# Patient Record
Sex: FEMALE | Race: WHITE | NOT HISPANIC OR LATINO | Employment: OTHER | URBAN - METROPOLITAN AREA
[De-identification: names, ages, dates, MRNs, and addresses within clinical notes are randomized per-mention and may not be internally consistent; named-entity substitution may affect disease eponyms.]

---

## 2017-07-07 ENCOUNTER — TRANSFERRED RECORDS (OUTPATIENT)
Dept: HEALTH INFORMATION MANAGEMENT | Facility: CLINIC | Age: 27
End: 2017-07-07

## 2017-10-06 ENCOUNTER — TRANSFERRED RECORDS (OUTPATIENT)
Dept: HEALTH INFORMATION MANAGEMENT | Facility: CLINIC | Age: 27
End: 2017-10-06

## 2018-07-30 ENCOUNTER — OFFICE VISIT (OUTPATIENT)
Dept: OBGYN | Facility: OTHER | Age: 28
End: 2018-07-30
Payer: COMMERCIAL

## 2018-07-30 VITALS
SYSTOLIC BLOOD PRESSURE: 138 MMHG | WEIGHT: 191.25 LBS | DIASTOLIC BLOOD PRESSURE: 82 MMHG | HEART RATE: 89 BPM | BODY MASS INDEX: 30.74 KG/M2 | HEIGHT: 66 IN

## 2018-07-30 DIAGNOSIS — Z30.011 ENCOUNTER FOR INITIAL PRESCRIPTION OF CONTRACEPTIVE PILLS: ICD-10-CM

## 2018-07-30 DIAGNOSIS — Z00.00 ANNUAL PHYSICAL EXAM: Primary | ICD-10-CM

## 2018-07-30 PROCEDURE — 99385 PREV VISIT NEW AGE 18-39: CPT | Performed by: OBSTETRICS & GYNECOLOGY

## 2018-07-30 PROCEDURE — G0145 SCR C/V CYTO,THINLAYER,RESCR: HCPCS | Performed by: OBSTETRICS & GYNECOLOGY

## 2018-07-30 RX ORDER — NORGESTIMATE AND ETHINYL ESTRADIOL 7DAYSX3 28
1 KIT ORAL DAILY
Qty: 90 TABLET | Refills: 3 | Status: SHIPPED | OUTPATIENT
Start: 2018-07-30 | End: 2019-11-06

## 2018-07-30 RX ORDER — NORGESTIMATE AND ETHINYL ESTRADIOL 7DAYSX3 28
1 KIT ORAL
COMMUNITY
Start: 2017-08-21 | End: 2018-07-30

## 2018-07-30 ASSESSMENT — ANXIETY QUESTIONNAIRES
7. FEELING AFRAID AS IF SOMETHING AWFUL MIGHT HAPPEN: NOT AT ALL
IF YOU CHECKED OFF ANY PROBLEMS ON THIS QUESTIONNAIRE, HOW DIFFICULT HAVE THESE PROBLEMS MADE IT FOR YOU TO DO YOUR WORK, TAKE CARE OF THINGS AT HOME, OR GET ALONG WITH OTHER PEOPLE: NOT DIFFICULT AT ALL
1. FEELING NERVOUS, ANXIOUS, OR ON EDGE: NOT AT ALL
5. BEING SO RESTLESS THAT IT IS HARD TO SIT STILL: NOT AT ALL
GAD7 TOTAL SCORE: 0
3. WORRYING TOO MUCH ABOUT DIFFERENT THINGS: NOT AT ALL
2. NOT BEING ABLE TO STOP OR CONTROL WORRYING: NOT AT ALL
6. BECOMING EASILY ANNOYED OR IRRITABLE: NOT AT ALL

## 2018-07-30 ASSESSMENT — PATIENT HEALTH QUESTIONNAIRE - PHQ9: 5. POOR APPETITE OR OVEREATING: NOT AT ALL

## 2018-07-30 NOTE — MR AVS SNAPSHOT
After Visit Summary   7/30/2018    Binta Peterson    MRN: [id number]           Patient Information     Date Of Birth          1990        Visit Information        Provider Department      7/30/2018 8:00 AM Annette Mckeon, DO [facility]        Today's Diagnoses     Annual physical exam    -  1    Encounter for initial prescription of contraceptive pills          Care Instructions    PREVENTIVE HEALTH RECOMMENDATIONS:   Get a physical every year.  A pap test is important to have done starting at age 21 and then every three years as long as your pap is normal.  When you receive the results of your pap test it will include when you need to have your next pap test.    You should be tested each year for chlamydia and gonorrhea if you are aged 16-25 and if you have had a new sexual partner since you were last tested.   Vaccines: Get a flu shot each year.   Eat at least 8-10 servings of fruits and vegetables daily.  Eat whole-grain bread and cereal, whole-wheat pasta and brown rice instead of white grains and white rice.   For bone health: Eat calcium-rich foods (dairy products) or take calcium pills (500 to 600 mg with vitamin D) twice a day with food.   Exercise for an average of 30 minutes a day, 5 days of the week. It can be as simple as taking a brisk walk.  This will help you control your weight and prevent many diseases.   Limit alcohol to one drink per day.   Don't smoke and limit your exposure to second hand smoke.  If you smoke consider making a plan to quit. Go to [employer] and clink on   [employer]  for help   Wear sunscreen with at least SPF15 to prevent skin damage and skin cancer.   Brush your teeth twice a day and floss once a day and see your dentist twice a year for an exam and cleaning.   Have a great year and I will look forward to seeing you next year.   Annette Mckeon, DO          Follow-ups after your visit        Follow-up notes from your care team   "   Return in about 1 year (around 2019).      Who to contact     If you have questions or need follow up information about today's clinic visit or your schedule please contact [facility] [place] directly at [phone].  Normal or non-critical lab and imaging results will be communicated to you by MyChart, letter or phone within 4 business days after the clinic has received the results. If you do not hear from us within 7 days, please contact the clinic through MyChart or phone. If you have a critical or abnormal lab result, we will notify you by phone as soon as possible.  Submit refill requests through [employer] or call your pharmacy and they will forward the refill request to us. Please allow 3 business days for your refill to be completed.          Additional Information About Your Visit        [employer] Information     [employer] lets you send messages to your doctor, view your test results, renew your prescriptions, schedule appointments and more. To sign up, go to [email or web address] . Click on \"Log in\" on the left side of the screen, which will take you to the Welcome page. Then click on \"Sign up Now\" on the right side of the page.     You will be asked to enter the access code listed below, as well as some personal information. Please follow the directions to create your username and password.     Your access code is: [email or web address]  Expires: 10/28/2018  8:47 AM     Your access code will  in 90 days. If you need help or a new code, please call your [place] clinic or [phone].        Care EveryWhere ID     This is your Care EveryWhere ID. This could be used by other organizations to access your [place] medical records  [id number]        Your Vitals Were     Pulse Height Last Period BMI (Body Mass Index)          89 5' 5.75\" (1.67 m) 2018 (Exact Date) 31.11 kg/m2         Blood Pressure from Last 3 Encounters:   18 138/82    Weight from Last 3 Encounters:   18 191 lb 4 " oz (86.8 kg)              We Performed the Following     Pap imaged thin layer screen reflex to HPV if ASCUS - recommend age 25 - 29          Today's Medication Changes          These changes are accurate as of 7/30/18  8:48 AM.  If you have any questions, ask your nurse or doctor.               These medicines have changed or have updated prescriptions.        Dose/Directions    norgestim-eth estrad triphasic 0.18/0.215/0.25 MG-35 MCG per tablet   Commonly known as:  ORTHO TRI-CYCLEN, TRI-SPRINTEC   This may have changed:  when to take this   Used for:  Annual physical exam   Changed by:  Annette Mckeon,         Dose:  1 tablet   Take 1 tablet by mouth daily   Quantity:  90 tablet   Refills:  3            Where to get your medicines      These medications were sent to [id number] - [place], [place] - [address] [address]  [address] [address] [address]     Phone:  [phone]     norgestim-eth estrad triphasic 0.18/0.215/0.25 MG-35 MCG per tablet                Primary Care Provider Fax #    Physician No Ref-Primary [phone]       No address on file        Equal Access to Services     BEBETO MACHADO : Hadii lizbeth feliciano hadasho Soomaali, waaxda luqadaha, qaybta kaalmada aderolandoyacarlos, babs tobin . [facility] [phone].    ATENCIÓN: Si habla español, tiene a neumann disposición servicios gratuitos de asistencia lingüística. [facility] [phone].    We comply with applicable federal civil rights laws and [place] laws. We do not discriminate on the basis of race, color, national origin, age, disability, sex, sexual orientation, or gender identity.            Thank you!     Thank you for choosing [facility]  for your care. Our goal is always to provide you with excellent care. Hearing back from our patients is one way we can continue to improve our services. Please take a few minutes to complete the written survey that you may receive in the mail after your visit  with us. Thank you!             Your Updated Medication List - Protect others around you: Learn how to safely use, store and throw away your medicines at www.disposemymeds.org.          This list is accurate as of 7/30/18  8:48 AM.  Always use your most recent med list.                   Brand Name Dispense Instructions for use Diagnosis    norgestim-eth estrad triphasic 0.18/0.215/0.25 MG-35 MCG per tablet    ORTHO TRI-CYCLEN, TRI-SPRINTEC    90 tablet    Take 1 tablet by mouth daily    Annual physical exam

## 2018-07-30 NOTE — LETTER
August 2, 2018      Binta Schafersherman  [address] [address] [address]    Dear ,      I am happy to inform you that your recent cervical cancer screening test (PAP smear) was normal.      Preventative screenings such as this help to ensure your health for years to come. You should repeat a pap smear in 3 years, unless otherwise directed.      You will still need to return to the clinic every year for your annual exam and other preventive tests.     Please contact the clinic at [phone] if you have further questions.       Sincerely,      Annette Mckeon DO/emelyn

## 2018-07-30 NOTE — PATIENT INSTRUCTIONS
PREVENTIVE HEALTH RECOMMENDATIONS:   Get a physical every year.  A pap test is important to have done starting at age 21 and then every three years as long as your pap is normal.  When you receive the results of your pap test it will include when you need to have your next pap test.    You should be tested each year for chlamydia and gonorrhea if you are aged 16-25 and if you have had a new sexual partner since you were last tested.   Vaccines: Get a flu shot each year.   Eat at least 8-10 servings of fruits and vegetables daily.  Eat whole-grain bread and cereal, whole-wheat pasta and brown rice instead of white grains and white rice.   For bone health: Eat calcium-rich foods (dairy products) or take calcium pills (500 to 600 mg with vitamin D) twice a day with food.   Exercise for an average of 30 minutes a day, 5 days of the week. It can be as simple as taking a brisk walk.  This will help you control your weight and prevent many diseases.   Limit alcohol to one drink per day.   Don't smoke and limit your exposure to second hand smoke.  If you smoke consider making a plan to quit. Go to [employer] and clink on   [employer]  for help   Wear sunscreen with at least SPF15 to prevent skin damage and skin cancer.   Brush your teeth twice a day and floss once a day and see your dentist twice a year for an exam and cleaning.   Have a great year and I will look forward to seeing you next year.   Annette Mckeon, DO

## 2018-07-30 NOTE — PROGRESS NOTES
"Chief Complaint   Patient presents with     Consult     Est. care       Subjective  Binta Peterson is a 27 year old female who presents for her annual exam.  She has no complaints today.  She is on an oral contractive pills and has been for years.  She gets her menses, regularly.  Her last menstrual period was 7/11.  She only bleeds for 2-3 days.  Patient uses tampons and only has to change them every few hours.  No problems urinating.  Normal bowel movements.  Patient is sexually active.  No dyspareunia.  No vaginal spotting after.  No pregnancies.      Most recent pap: [place]  History of abnormal Pap smear:  No  History of STI's: No  History of PID:  No    Family history of uterine cancer:  No  Family history of ovarian cancer:  No  Family history of colon cancer:  No  Family history of breast cancer:  No    ROS  ROS: 10 point ROS neg other than the symptoms noted above in the HPI.    History reviewed. No pertinent past medical history.  History reviewed. No pertinent surgical history.  History reviewed. No pertinent family history.  Social History   Substance Use Topics     Smoking status: Never Smoker     Smokeless tobacco: Never Used     Alcohol use Yes      Comment: occ       Tobacco abuse:  No  Do you get at least three servings of calcium containing foods daily (dairy, green leafy vegetables, etc.)? yes   Outside of work or daily activities, how many days per week do you exercise for 30 minutes or longer? 3-4x per week  The patient does not drink >3 drinks per day nor >7 drinks per week.   Have you had an eye exam in the past two years? yes   Do you see a dentist twice per year? yes   Today's PHQ-2 Score:   Abuse: Current or Past(Physical, Sexual or Emotional)- No   Do you feel safe in your environment - Yes  Objective  Vitals: /82 (Patient Position: Sitting, Cuff Size: Adult Regular)  Pulse 89  Ht 5' 5.75\" (1.67 m)  Wt 191 lb 4 oz (86.8 kg)  LMP 07/11/2018 (Exact Date)  BMI 31.11 kg/m2  BMI= " Body mass index is 31.11 kg/(m^2).    General appearance=well developed, well-nourished female  Psych=mood is stable  Skin=no rashes or lesions seen  Breast:  Benign exam, no masses palpated.  No skin changes, no axillary lymphadenopathy, no nipple discharge.  Axilla feel completely normal, no lymph node enlargement and non-tender.  Neck=overall appearance is normal  Heart=RRR, no murmurs, no swelling noted  Thyroid=normal, no masses, no TTP, no enlargement  Lungs=non-labored breathing, no use of accessory muscles, clear to ausculation bilaterally  Abd=soft, Nontender/nondistended, +bowel sounds x4, no masses, no signs of hernias, no evidence of hepatosplenomegaly  PELVIC:    External genitalia: normal without lesions or masses  Urethral meatus: no lesions or prolapse noted, normal size  Urethra: no masses, non tender  Bladder: non tender, no fullness  Vagina: normal mucosa and rugae, no discharge.  Cervix: normal without lesion, no cervical motion tenderness, healthy, nulliparous, pap smear obtained  Uterus: small, mobile, nontender.  Adnexa: non tender, without masses  Rectal: deferred  Ext=no clubbing or cyanosis, no swelling      Last lipid profile: Unknown  Regular self breast exam: No  Most recent mammogram:  NA  History of abnormal mammogram:  N/A  Fit testing:  N/A  DEXA:  N/A        Assessment/Plan  1.)  Annual/well woman exam=pap smear, obtain records from [facility], sign up for My Chart  2.)  Birth control=refill oral contractive pills       The following topics were discussed or recommended   Discussed seat belt, helmet and sunscreen use  Vision screening   Calcium/Vitamin D supplement=Recommended 1000 mg of calcium daily and 800 IU of vitamin D.  Contraception     25 minutes was spent face to face with the patient today discussing her history, diagnosis, and follow-up plan as noted above.  Over 50% of the visit was spent in counseling and coordination of care.    Total Visit Time: 30  avery Mckeon

## 2018-07-31 ASSESSMENT — ANXIETY QUESTIONNAIRES: GAD7 TOTAL SCORE: 0

## 2018-07-31 ASSESSMENT — PATIENT HEALTH QUESTIONNAIRE - PHQ9: SUM OF ALL RESPONSES TO PHQ QUESTIONS 1-9: 0

## 2018-08-01 LAB
COPATH REPORT: NORMAL
PAP: NORMAL

## 2019-06-06 ENCOUNTER — OFFICE VISIT (OUTPATIENT)
Dept: OBGYN | Facility: OTHER | Age: 29
End: 2019-06-06
Payer: COMMERCIAL

## 2019-06-06 VITALS
HEART RATE: 80 BPM | BODY MASS INDEX: 31.41 KG/M2 | SYSTOLIC BLOOD PRESSURE: 100 MMHG | HEIGHT: 64 IN | DIASTOLIC BLOOD PRESSURE: 70 MMHG | WEIGHT: 184 LBS

## 2019-06-06 DIAGNOSIS — Z00.00 ANNUAL PHYSICAL EXAM: Primary | ICD-10-CM

## 2019-06-06 DIAGNOSIS — N89.8 VAGINAL CYST: ICD-10-CM

## 2019-06-06 DIAGNOSIS — T75.3XXA MOTION SICKNESS, INITIAL ENCOUNTER: ICD-10-CM

## 2019-06-06 PROBLEM — Z30.011 ENCOUNTER FOR INITIAL PRESCRIPTION OF CONTRACEPTIVE PILLS: Status: RESOLVED | Noted: 2018-07-30 | Resolved: 2019-06-06

## 2019-06-06 PROCEDURE — 99395 PREV VISIT EST AGE 18-39: CPT | Performed by: OBSTETRICS & GYNECOLOGY

## 2019-06-06 RX ORDER — SCOLOPAMINE TRANSDERMAL SYSTEM 1 MG/1
1 PATCH, EXTENDED RELEASE TRANSDERMAL
Qty: 7 PATCH | Refills: 0 | Status: SHIPPED | OUTPATIENT
Start: 2019-06-06 | End: 2019-11-06

## 2019-06-06 RX ORDER — PSEUDOEPHEDRINE HCL 30 MG
TABLET ORAL
Refills: 0 | COMMUNITY
Start: 2019-01-24 | End: 2019-11-25

## 2019-06-06 ASSESSMENT — PATIENT HEALTH QUESTIONNAIRE - PHQ9: SUM OF ALL RESPONSES TO PHQ QUESTIONS 1-9: 0

## 2019-06-06 ASSESSMENT — MIFFLIN-ST. JEOR: SCORE: 1551.11

## 2019-06-06 NOTE — PATIENT INSTRUCTIONS
aPREVENTIVE HEALTH RECOMMENDATIONS:   Get a physical every year.  A pap test is important to have done starting at age 21 and then every three years as long as your pap is normal.  When you receive the results of your pap test it will include when you need to have your next pap test.    You should be tested each year for chlamydia and gonorrhea if you are aged 16-25 and if you have had a new sexual partner since you were last tested.   Vaccines: Get a flu shot each year.   Eat at least 8-10 servings of fruits and vegetables daily.  Eat whole-grain bread and cereal, whole-wheat pasta and brown rice instead of white grains and white rice.   For bone health: Eat calcium-rich foods (dairy products) or take calcium pills (500 to 600 mg with vitamin D) twice a day with food.   Exercise for an average of 30 minutes a day, 5 days of the week. It can be as simple as taking a brisk walk.  This will help you control your weight and prevent many diseases.   Limit alcohol to one drink per day.   Don't smoke and limit your exposure to second hand smoke.  If you smoke consider making a plan to quit. Go to [employer] and clink on   [employer]  for help   Wear sunscreen with at least SPF15 to prevent skin damage and skin cancer.   Brush your teeth twice a day and floss once a day and see your dentist twice a year for an exam and cleaning.   Have a great year and I will look forward to seeing you next year.   Annette Mckeon, DO

## 2019-06-06 NOTE — PROGRESS NOTES
Chief Complaint   Patient presents with     Physical       Subjective  Binta Peterson is a 28 year old female who presents for her annual exam.  She has no complaints today.  She recently stopped her oral contractive pills because she is ready to start a family.  We discussed timing of pregnancy and when to follow up.  She was getting her menses regularly while on the pill.  Her last menstrual period was 5/15.  She only bleeds for 2-3 days.  Patient uses tampons and only has to change them every few hours.  No problems urinating.  Normal bowel movements.  Patient is sexually active.  No dyspareunia.  No vaginal spotting after.  No pregnancies.  Pt is going to [place] in a few weeks.  She has intense motion sickness.  Will give new prescription for Scopolamine patch.  That has worked well for her in the past.      Most recent pap:  2018  History of abnormal Pap smear:  No  History of STI's:  No  History of PID:  No    Family history of uterine cancer:  No  Family history of ovarian cancer:  No  Family history of colon cancer:  No  Family history of breast cancer:  No    ROS  ROS: 10 point ROS neg other than the symptoms noted above in the HPI.    History reviewed. No pertinent past medical history.  History reviewed. No pertinent surgical history.  History reviewed. No pertinent family history.  Social History     Tobacco Use     Smoking status: Never Smoker     Smokeless tobacco: Never Used   Substance Use Topics     Alcohol use: Yes     Comment: occ       Tobacco abuse:  No  Do you get at least three servings of calcium containing foods daily (dairy, green leafy vegetables, etc.)? yes   Outside of work or daily activities, how many days per week do you exercise for 30 minutes or longer? 3-4x per week  The patient does not drink >3 drinks per day nor >7 drinks per week.   Have you had an eye exam in the past two years? yes   Do you see a dentist twice per year? yes   Today's PHQ-2 Score:   Abuse: Current or  "Past(Physical, Sexual or Emotional)- No   Do you feel safe in your environment - Yes  Objective  Vitals: /70   Pulse 80   Ht 1.636 m (5' 4.41\")   Wt 83.5 kg (184 lb)   LMP 05/15/2019   BMI 31.18 kg/m    BMI= Body mass index is 31.18 kg/m .    General appearance=well developed, well-nourished female  Psych=mood is stable  Skin=no rashes or lesions seen  Breast:  Benign exam, no masses palpated.  No skin changes, no axillary lymphadenopathy, no nipple discharge.  Axilla feel completely normal, no lymph node enlargement and non-tender.  Neck=overall appearance is normal  Heart=RRR, no murmurs, no swelling noted  Thyroid=normal, no masses, no TTP, no enlargement  Lungs=non-labored breathing, no use of accessory muscles, clear to ausculation bilaterally  Abd=soft, Nontender/nondistended, +bowel sounds x4, no masses, no signs of hernias, no evidence of hepatosplenomegaly  PELVIC:    External genitalia: normal without lesions or masses  Urethral meatus: no lesions or prolapse noted, normal size  Urethra: no masses, non tender  Bladder: non tender, no fullness  Vagina: normal mucosa and rugae, no discharge.  Cervix: small cyst appearing lesion superior to cervix, non tender to palpation, no cervical motion tenderness, healthy, nulliparous  Uterus: small, mobile, nontender.  Adnexa: non tender, without masses  Rectal: deferred  Ext=no clubbing or cyanosis, no swelling      Last lipid profile: Unknown  Regular self breast exam: No  Most recent mammogram:  N/A  History of abnormal mammogram: N/A  Fit testing:  N/A  DEXA:  N/A        Assessment/Plan  1.)  Annual/well woman=CBC, CMP, lipids, TSH  2.)  Anterior vaginal wall cyst=pelvic ultrasound   3.)  Motion sickness=Scopolamine patch ordered      The following topics were discussed or recommended   Discussed seat belt, helmet and sunscreen use  Vision screening   Calcium/Vitamin D supplement=Recommended 1000 mg of calcium daily and 800 IU of vitamin D.    25 minutes " was spent face to face with the patient today discussing her history, diagnosis, and follow-up plan as noted above.  Over 50% of the visit was spent in counseling and coordination of care.    Total Visit Time: 30 minutes        Annette Mckeon

## 2019-06-10 ENCOUNTER — ANCILLARY PROCEDURE (OUTPATIENT)
Dept: ULTRASOUND IMAGING | Facility: OTHER | Age: 29
End: 2019-06-10
Attending: OBSTETRICS & GYNECOLOGY
Payer: COMMERCIAL

## 2019-06-10 DIAGNOSIS — Z00.00 ANNUAL PHYSICAL EXAM: ICD-10-CM

## 2019-06-10 DIAGNOSIS — N89.8 VAGINAL CYST: ICD-10-CM

## 2019-06-10 LAB
ALBUMIN SERPL-MCNC: 3.9 G/DL (ref 3.4–5)
ALP SERPL-CCNC: 49 U/L (ref 40–150)
ALT SERPL W P-5'-P-CCNC: 30 U/L (ref 0–50)
ANION GAP SERPL CALCULATED.3IONS-SCNC: 5 MMOL/L (ref 3–14)
AST SERPL W P-5'-P-CCNC: 17 U/L (ref 0–45)
BILIRUB SERPL-MCNC: 0.5 MG/DL (ref 0.2–1.3)
BUN SERPL-MCNC: 12 MG/DL (ref 7–30)
CALCIUM SERPL-MCNC: 8.8 MG/DL (ref 8.5–10.1)
CHLORIDE SERPL-SCNC: 107 MMOL/L (ref 94–109)
CHOLEST SERPL-MCNC: 120 MG/DL
CO2 SERPL-SCNC: 29 MMOL/L (ref 20–32)
CREAT SERPL-MCNC: 0.61 MG/DL (ref 0.52–1.04)
ERYTHROCYTE [DISTWIDTH] IN BLOOD BY AUTOMATED COUNT: 12.5 % (ref 10–15)
GFR SERPL CREATININE-BSD FRML MDRD: >90 ML/MIN/{1.73_M2}
GLUCOSE SERPL-MCNC: 94 MG/DL (ref 70–99)
HCT VFR BLD AUTO: 43.9 % (ref 35–47)
HDLC SERPL-MCNC: 50 MG/DL
HGB BLD-MCNC: 15.2 G/DL (ref 11.7–15.7)
LDLC SERPL CALC-MCNC: 50 MG/DL
MCH RBC QN AUTO: 31.4 PG (ref 26.5–33)
MCHC RBC AUTO-ENTMCNC: 34.6 G/DL (ref 31.5–36.5)
MCV RBC AUTO: 91 FL (ref 78–100)
NONHDLC SERPL-MCNC: 70 MG/DL
PLATELET # BLD AUTO: 378 10E9/L (ref 150–450)
POTASSIUM SERPL-SCNC: 4.3 MMOL/L (ref 3.4–5.3)
PROT SERPL-MCNC: 7.7 G/DL (ref 6.8–8.8)
RBC # BLD AUTO: 4.84 10E12/L (ref 3.8–5.2)
SODIUM SERPL-SCNC: 141 MMOL/L (ref 133–144)
TRIGL SERPL-MCNC: 101 MG/DL
TSH SERPL DL<=0.005 MIU/L-ACNC: 1.9 MU/L (ref 0.4–4)
WBC # BLD AUTO: 6.3 10E9/L (ref 4–11)

## 2019-06-10 PROCEDURE — 85027 COMPLETE CBC AUTOMATED: CPT | Performed by: OBSTETRICS & GYNECOLOGY

## 2019-06-10 PROCEDURE — 76856 US EXAM PELVIC COMPLETE: CPT

## 2019-06-10 PROCEDURE — 80061 LIPID PANEL: CPT | Performed by: OBSTETRICS & GYNECOLOGY

## 2019-06-10 PROCEDURE — 80053 COMPREHEN METABOLIC PANEL: CPT | Performed by: OBSTETRICS & GYNECOLOGY

## 2019-06-10 PROCEDURE — 36415 COLL VENOUS BLD VENIPUNCTURE: CPT | Performed by: OBSTETRICS & GYNECOLOGY

## 2019-06-10 PROCEDURE — 84443 ASSAY THYROID STIM HORMONE: CPT | Performed by: OBSTETRICS & GYNECOLOGY

## 2019-06-10 PROCEDURE — 76830 TRANSVAGINAL US NON-OB: CPT

## 2019-11-06 ENCOUNTER — PRENATAL OFFICE VISIT (OUTPATIENT)
Dept: OBGYN | Facility: OTHER | Age: 29
End: 2019-11-06
Payer: COMMERCIAL

## 2019-11-06 VITALS
DIASTOLIC BLOOD PRESSURE: 76 MMHG | WEIGHT: 190.5 LBS | BODY MASS INDEX: 32.29 KG/M2 | HEART RATE: 80 BPM | SYSTOLIC BLOOD PRESSURE: 134 MMHG

## 2019-11-06 DIAGNOSIS — Z32.01 PREGNANCY TEST POSITIVE: ICD-10-CM

## 2019-11-06 DIAGNOSIS — N92.6 IRREGULAR MENSES: ICD-10-CM

## 2019-11-06 DIAGNOSIS — N91.2 AMENORRHEA: Primary | ICD-10-CM

## 2019-11-06 LAB
B-HCG SERPL-ACNC: 1471 IU/L (ref 0–5)
HCG UR QL: POSITIVE

## 2019-11-06 PROCEDURE — 81025 URINE PREGNANCY TEST: CPT | Performed by: ADVANCED PRACTICE MIDWIFE

## 2019-11-06 PROCEDURE — 84702 CHORIONIC GONADOTROPIN TEST: CPT | Performed by: ADVANCED PRACTICE MIDWIFE

## 2019-11-06 PROCEDURE — 36415 COLL VENOUS BLD VENIPUNCTURE: CPT | Performed by: ADVANCED PRACTICE MIDWIFE

## 2019-11-06 PROCEDURE — 99213 OFFICE O/P EST LOW 20 MIN: CPT | Performed by: ADVANCED PRACTICE MIDWIFE

## 2019-11-06 NOTE — PATIENT INSTRUCTIONS
Thank for choosing our clinic for your health care needs. Our goal is to provided you with excellent care. One way that we continue to improve our care is by listening to our patients. Please take a few minutes to complete the written survey that you may receive in the mail after your visit.     You may reach your care team by calling the following number:    [place]- [phone]  [place] [phone]  For clinic hours at [facility]  please visit the [place] web site [email or web address]    Notification of your lab results:  Normal or non-critical lab and imaging results will be communicated to you by Mychart, letter, or phone within 7 days. If you do not hear from us within 10 days, please contact us through [employer] or phone. If you have a critical or abnormal lab result, we will notify you by phone as soon as possible.  Pap smears often take a bit longer.     After Hours nurse advice:  [place] [facility]:  [phone]     Medication Refills:  Please contact your pharmacy and they will forward the refill to us. Please allow 3 business days for your refills to be completed.     Secure access to your medical record:  Use [employer] (secure email communication and access to your chart) to send your primary care provider a message or make an appointment. Ask someone on your Team how to sign up for [employer]. To log on to [employer] or for more information in [employer] please visit the website at [email or web address].            How to prevent CMV or cytomegalovirus infection while you are pregnant:    Thoroughly wash your hands with soap and warm water especially after doing things such as:  Diaper changes  Feeding or bathing a child  Wiping a child's runny nose or drool  Handling a child's toys    Do not share cups, plates, utensils, toothbrushes or food with your children  Do not kiss young children on the mouth or cheek. Instead, kiss them on the head or give them a hug.  Do not share towels or  washcloths with young children.  Clean toy, counter tops, and other surfaces that come in contact with urine or saliva.        [employer]  Individuals can reduce the risk for listeria contamination through proper food selection, handling, and storage, such as:    Rinsing raw produce (fuits and vegetables) before eating, cutting, or cooking;    Keeping refrigerators at 40 degrees F or lower;    Buying soft cheeses only if their labels state that they are made with pasteurized milk.  Also avoiding cheese that has not been initially wrapped in plastic.  These cheeses include brie, camembert, blue and the soft Mexican cheeses like con queso.    Heating all food that can be heated but especially hot dogs, luncheon meats, and cold cuts to an internal temperature of 165 degrees F or until steaming hot before serving them; and    Washing your hands for at least 20 seconds with warm water and soap before and after handling cantaloupes or other melons.    Watch for food recalls for listeria and contact us if you believe you have been exposed.               First line remedies for nausea in pregnancy    Eat small frequent meals every 2-3 hours if possible.   Avoid food at extremes of temparture and drinks with carbination.  Eat foods that appeal to you, avoiding fats and spicy foods.  Bread, pasta, crackers, potatoes, and rice tend to be tolerated the best.  Don't worry about what you eat in the first 3 months, it is more important that you can eat and keep it down.   Try flat ginger ale.  Ginger is a herbal remedy for nausea and you can use it in any form.  There are ginger tablets you can purchase.  The dose is 500 to 1000 mg a day.   You may also try doxylamine 12.5 mg three times a day which is a sleeping medication along with Vitamin B6 25 mg three times a day.  This combination takes up to a week to work so give it some time.  Be sure to take both the doxylamine and B6  Doxylamine is sometimes called Unisom and it comes in  25 mg tablets so you will have to break it in half and take half a tablet.   It is important to take the Unisom and B6 together or it won't be effective.  If you begin to vomit more than 5 or 6 times a day and feel that you are unable to keep anything down, call the clinic for an appointment to be seen.                Fruits and vegetables high in pesticide contamination  Strawberries  Spinach  Kale  Nectarines  Peaches  Apples  Grapes  Cherries  Pears  Tomatoes  Celery  Potatoes  Hot Peppers.     Consider extra washing of these fruits and vegetables, peeling if possible or purchasing organics.     Fruits and vegetables lowest if pesticide contramination:  Avocado  Sweet corn  Pineapple  Cabbage   Onions   Frozen peas  Papaya  Asparagus  Mushrooms  Eggplant  Honeydew  Kiwi  Cantaloupe  Cauliflower  Broccoli      Consider eliminating or reducing the following additives in personal care products and make up:  Triclosan  Paraben  Phthalates  Phenols  Products that do not contain these additives are often found in the organic or green sections of stores.

## 2019-11-06 NOTE — PROGRESS NOTES
Question 11/6/2019  2:14 PM CST   Past Medical History     Diabetes? No   Hypertension? No   Heart disease, mitral valve prolapse or rheumatic fever? No   An autoimmune disease such as lupus or rheumatoid arthritis? No   Kidney disease or urinary tract infection? No   Epilepsy, seizures or spells? No   Migraine headaches? Unknown   A stroke or loss of function or sensation? No   Any other neurological problems? No   Have you ever been treated for depression? No   Are you having problems with crying spells or loss of self-esteem? No   Have you ever required psychiatric care? No   Have you ever had hepatitis, liver disease or jaundice? No   Have you been treated for blood clots in your veins, deep vein thromosis, inflammation in the veins, thrombosis, phlebitis, pulmonary embolism or varicosities? No   Have you had excessive bleeding after surgery or dental work? No   Do you bleed more than other women after a cut or scratch? No   Do you have a history of anemia? No   Have you ever had thyroid problems or taken thyroid medication? No    Do you have any endocrine problems? No   Have you ever been in a major accident or suffered serious trauma? No   Within the last year, has anyone hit, slapped, kicked or otherwise hurt you? No   In the last year, has anyone forced you to have sex when you didn't want to? No   Past Medical History 2     Have you ever received a blood transfusion? No   Would you refuse a blood transfusion if a doctor judged it to be medically necessary? No    If you answered Yes, would you rather die than receive a blood transfusion? No   If you answered Yes, is this for Holiness reasons? No   Does anyone in your home smoke? No   Do you use tobacco products? No   Do you drink beer, wine or hard liquor? Yes   Do you use any of the following: marijuana, speed, cocaine, heroin, hallucinogens or other drugs? No    Is your blood type Rh negative? No   Have you ever had abnormal antibodies in your blood? No    Have you ever had asthma? No   Have you ever had tuberculosis? No   Do you have any allergies to drugs or over-the-counter medications? No   Allergies: Dust Mites, Aspartame, Ethanol, Venlafaxine, Hydrochloride, Sertraline No   Have you had any breast problems? No   Have you ever ? No   Have you had any gynecological surgical procedures such as cervical conization, a LEEP procedure, laser treatment, cryosurgery of the cervix or a dilation and curettage, etc? No   Have you ever had any other surgical procedures? No   Have you been hospitalized for a nonsurgical reason excluding normal delivery? No   Have you ever had any anesthetic complications? No   Have you ever had an abnormal pap smear? No   Past Medical History (Continued)    Do you have a history of abnormalities of the uterus? No   Did your mother take CAT or any other hormones when she was pregnant with you? No   Did it take you more than a year to become pregnant? No   Have you ever been evaluated or treated for infertility? No   Is there a history of medical problems in your family, which you feel may be important to this pregnancy? No   Do you have any other problems we have not asked about which you feel may be important to this pregnancy? No   Symptoms since Last Menstrual Period     Do you have any of the following symptoms: abdominal pain, blood in stools or urine, chest pain, shortness of breath, coughing or vomiting up blood, your heart racing or skipping beats, nausea and vomiting, pain on urination or vaginal discharge or bleeding?  No   Current Medications you're using:    Current medications, including over-the-counter medications, you are using? (If not applicable answer none) Vitamins-prenatal multi + DHA (200 mg), vitamin c (250mg), and fiber well (5g of fiber)   Genetic Screening     Will the patient be 35 years old or older at the time of delivery? No   Has the patient, baby's father or anyone in either family had:    Thalassemia  (Welsh, Beninese, Mediterranean or  background only) and an MCV result less than 80? No   Neural tube defect such as meningomyelocele, spina bifida or anencephaly? No   Congenital heart defect? No   Down's Syndrome? No   Dylna-Sachs disease (Methodist, Cajun, [place])? No   Sickle cell disease or trait ()? No   Hemophilia or other inherited problems of blood? No   Muscular dystrophy? No   Cystic fibrosis? No   [place]'s chorea? No   Mental retardation/autism? No   If yes, was the person tested for fragile X? No   Any other inherited genetic or chromosomal disorder? No   Maternal metabolic disorder (e.g Insulin-dependent diabetes, PKU)? No   A child with birth defects not listed above? No   Recurrent pregnancy loss or stillbirth? No    Has the patient had any medications/street drugs/alcohol since her last menstrual period? No   Does the patient or baby's father have any other genetic risks? No   Infection History    Do you object to being tested for Hepatitis B? No   Do you object to being tested for HIV? No    Do you feel that you are at high risk for coming in contact with the AIDS virus? No   Have you ever been treated for tuberculosis? Unknown   Have you received the BCG vaccine (tuberculosis)? Unknown   Have you ever had a positive skin test for tuberculosis? No   Do you live with someone who has tuberculosis? No   Have you ever been exposed to tuberculosis? No   Do you have genital herpes? No   Does your partner have genital herpes? No   Have you had a viral illness since your last period? No   Have you ever had gonorrhea, chlamydia, syphilis, venereal warts, trichomoniasis, pelvic inflammatory disease or any other sexually transmitted disease? No   Do you know if you are a genital group B streptococcus carrier? No   Have you had chicken pox/varicella? Yes    Have you been vaccinated against chicken Pox? Yes   Have you had any other infectious diseases? No   Message     This message was  automatically generated when an appointment dated 11/6/2019 was changed to 11/6/2019.      The appointment contained the following questionnaire data, which was not moved to the new appointment:      Patient Questionnaire Submission   --------------------------------      Questionnaire: Prenatal OB Questionnaire       ~~~~~~~~~~~~~~~~~~~~~~~~~~~~~~~~   Past Medical History        Question: Diabetes?       Answer:   No          Question: Hypertension?       Answer:   No          Question: Heart disease, mitral valve prolapse or rheumatic fever?       Answer:   No          Question: An autoimmune disease such as lupus or rheumatoid arthritis?       Answer:   No          Question: Kidney disease or urinary tract infection?       Answer:   No          Question: Epilepsy, seizures or spells?       Answer:   No          Question: Migraine headaches?       Answer:   Unknown          Question: A stroke or loss of function or sensation?       Answer:   No          Question: Any other neurological problems?       Answer:   No          Question: Have you ever been treated for depression?       Answer:   No          Question: Are you having problems with crying spells or loss of self-esteem?       Answer:   No          Question: Have you ever required psychiatric care?       Answer:   No          Question: Have you ever had hepatitis, liver disease or jaundice?       Answer:   No          Question: Have you been treated for blood clots in your veins, deep vein thromosis, inflammation in the veins, thrombosis, phlebitis, pulmonary embolism or varicosities?       Answer:   No          Question: Have you had excessive bleeding after surgery or dental work?       Answer:   No          Question: Do you bleed more than other women after a cut or scratch?       Answer:   No          Question: Do you have a history of anemia?       Answer:   No          Question: Have you ever had thyroid problems or taken thyroid medication?        Answer:   No          Question:  Do you have any endocrine problems?       Answer:   No          Question: Have you ever been in a major accident or suffered serious trauma?       Answer:   No          Question: Within the last year, has anyone hit, slapped, kicked or otherwise hurt you?       Answer:   No          Question: In the last year, has anyone forced you to have sex when you didn't want to?       Answer:   No            ~~~~~~~~~~~~~~~~~~~~~~~~~~~~~~~~   Past Medical History 2        Question: Have you ever received a blood transfusion?       Answer:   No          Question: Would you refuse a blood transfusion if a doctor judged it to be medically necessary?       Answer:   No          Question:  If you answered Yes, would you rather die than receive a blood transfusion?       Answer:   No          Question: If you answered Yes, is this for Holiness reasons?       Answer:   No          Question: Does anyone in your home smoke?       Answer:   No          Question: Do you use tobacco products?       Answer:   No          Question: Do you drink beer, wine or hard liquor?       Answer:   Yes          Question: Do you use any of the following: marijuana, speed, cocaine, heroin, hallucinogens or other drugs?       Answer:   No          Question:  Is your blood type Rh negative?       Answer:   No          Question: Have you ever had abnormal antibodies in your blood?       Answer:   No          Question: Have you ever had asthma?       Answer:   No          Question: Have you ever had tuberculosis?       Answer:   No          Question: Do you have any allergies to drugs or over-the-counter medications?       Answer:   No          Question: Allergies: Dust Mites, Aspartame, Ethanol, Venlafaxine, Hydrochloride, Sertraline       Answer:   No          Question: Have you had any breast problems?       Answer:   No          Question: Have you ever ?       Answer:   No          Question: Have you had any  gynecological surgical procedures such as cervical conization, a LEEP procedure, laser treatment, cryosurgery of the cervix or a dilation and curettage, etc?       Answer:   No          Question: Have you ever had any other surgical procedures?       Answer:   No          Question: Have you been hospitalized for a nonsurgical reason excluding normal delivery?       Answer:   No          Question: Have you ever had any anesthetic complications?       Answer:   No          Question: Have you ever had an abnormal pap smear?       Answer:   No            ~~~~~~~~~~~~~~~~~~~~~~~~~~~~~~~~   Past Medical History (Continued)       Question: Do you have a history of abnormalities of the uterus?       Answer:   No          Question: Did your mother take CAT or any other hormones when she was pregnant with you?       Answer:   No          Question: Did it take you more than a year to become pregnant?       Answer:   No          Question: Have you ever been evaluated or treated for infertility?       Answer:   No          Question: Is there a history of medical problems in your family, which you feel may be important to this pregnancy?       Answer:   No          Question: Do you have any other problems we have not asked about which you feel may be important to this pregnancy?       Answer:   No            ~~~~~~~~~~~~~~~~~~~~~~~~~~~~~~~~   Symptoms since Last Menstrual Period        Question: Do you have any of the following symptoms: abdominal pain, blood in stools or urine, chest pain, shortness of breath, coughing or vomiting up blood, your heart racing or skipping beats, nausea and vomiting, pain on urination or vaginal discharge or bleeding?        Answer:   No            ~~~~~~~~~~~~~~~~~~~~~~~~~~~~~~~~   Current Medications you're using:       Question: Current medications, including over-the-counter medications, you are using? (If not applicable answer none)       Answer:   Vitamins-prenatal multi + DHA (200 mg),  vitamin c (250mg), and fiber well (5g of fiber)            ~~~~~~~~~~~~~~~~~~~~~~~~~~~~~~~~   Genetic Screening        Question: Will the patient be 35 years old or older at the time of delivery?       Answer:   No            ~~~~~~~~~~~~~~~~~~~~~~~~~~~~~~~~   Has the patient, baby's father or anyone in either family had:       Question: Thalassemia (Italian, Greek, Mediterranean or  background only) and an MCV result less than 80?       Answer:   No          Question: Neural tube defect such as meningomyelocele, spina bifida or anencephaly?       Answer:   No          Question: Congenital heart defect?       Answer:   No          Question: Down's Syndrome?       Answer:   No          Question: Dylan-Sachs disease (Anabaptism, Cajun, [place] citizen[place])?       Answer:   No          Question: Sickle cell disease or trait ()?       Answer:   No          Question: Hemophilia or other inherited problems of blood?       Answer:   No          Question: Muscular dystrophy?       Answer:   No          Question: Cystic fibrosis?       Answer:   No          Question: [place]'s chorea?       Answer:   No          Question: Mental retardation/autism?       Answer:   No          Question: If yes, was the person tested for fragile X?       Answer:   No          Question: Any other inherited genetic or chromosomal disorder?       Answer:   No          Question: Maternal metabolic disorder (e.g Insulin-dependent diabetes, PKU)?       Answer:   No          Question: A child with birth defects not listed above?       Answer:   No          Question: Recurrent pregnancy loss or stillbirth?       Answer:   No          Question:  Has the patient had any medications/street drugs/alcohol since her last menstrual period?       Answer:   No          Question: Does the patient or baby's father have any other genetic risks?       Answer:   No            ~~~~~~~~~~~~~~~~~~~~~~~~~~~~~~~~   Infection History       Question: Do you object to  being tested for Hepatitis B?       Answer:   No          Question: Do you object to being tested for HIV?       Answer:   No          Question:  Do you feel that you are at high risk for coming in contact with the AIDS virus?       Answer:   No          Question: Have you ever been treated for tuberculosis?       Answer:   Unknown          Question: Have you received the BCG vaccine (tuberculosis)?       Answer:   Unknown          Question: Have you ever had a positive skin test for tuberculosis?       Answer:   No          Question: Do you live with someone who has tuberculosis?       Answer:   No          Question: Have you ever been exposed to tuberculosis?       Answer:   No          Question: Do you have genital herpes?       Answer:   No          Question: Does your partner have genital herpes?       Answer:   No          Question: Have you had a viral illness since your last period?       Answer:   No          Question: Have you ever had gonorrhea, chlamydia, syphilis, venereal warts, trichomoniasis, pelvic inflammatory disease or any other sexually transmitted disease?       Answer:   No          Question: Do you know if you are a genital group B streptococcus carrier?       Answer:   No          Question: Have you had chicken pox/varicella?       Answer:   Yes          Question:  Have you been vaccinated against chicken Pox?       Answer:   Yes          Question: Have you had any other infectious diseases?       Answer:   No     Phq2 (   1999 [employer],All Rights Reserved. Used With Permission. Developed By Radha Lugo,Pastora Adkins,Mendez Magallanes And Colleagues,With An Educational Junior From [employer].)     Question 11/6/2019  2:14 PM CST   Q1: Little interest or pleasure in doing things Not at all   Q2: Feeling down, depressed or hopeless Not at all   PHQ-2 Score (range: 0 - 6) Guadalupe Baltazar RN on 11/6/2019 at 4:22 PM

## 2019-11-06 NOTE — PROGRESS NOTES
Binta Peterson is a 29 year old who presents to the clinic for confirmation of pregnancy.   Patient's last menstrual period was 09/27/2019 (exact date).  Has been on CHC for many years has had 2 periods since stopping CHC in May   First positive pregnancy test at home was November 2  Ovulation OCT 17 by ovulation testing so 4 w 5 d today        Patient Active Problem List   Diagnosis     Vaginal cyst     Motion sickness     History reviewed. No pertinent past medical history.  History reviewed. No pertinent surgical history.  Current Outpatient Medications   Medication Sig Dispense Refill     norgestim-eth estrad triphasic (ORTHO TRI-CYCLEN, TRI-SPRINTEC) 0.18/0.215/0.25 MG-35 MCG per tablet Take 1 tablet by mouth daily (Patient not taking: Reported on 6/6/2019) 90 tablet 3     scopolamine (TRANSDERM) 1 MG/3DAYS 72 hr patch Place 1 patch onto the skin every 72 hours (Patient not taking: Reported on 11/6/2019) 7 patch 0     SM NASAL DECONGESTANT MAX ST 30 MG tablet TAKE TWO TABLETS BY MOUTH EVERY 4 TO 6 HOURS  0     No Known Allergies  Social History     Socioeconomic History     Marital status:      Spouse name: Not on file     Number of children: Not on file     Years of education: Not on file     Highest education level: Not on file   Occupational History     Not on file   Social Needs     Financial resource strain: Not on file     Food insecurity:     Worry: Not on file     Inability: Not on file     Transportation needs:     Medical: Not on file     Non-medical: Not on file   Tobacco Use     Smoking status: Never Smoker     Smokeless tobacco: Never Used   Substance and Sexual Activity     Alcohol use: Yes     Comment: occ     Drug use: No     Sexual activity: Yes     Partners: Male     Birth control/protection: None   Lifestyle     Physical activity:     Days per week: Not on file     Minutes per session: Not on file     Stress: Not on file   Relationships     Social connections:     Talks on phone: Not  on file     Gets together: Not on file     Attends Oriental orthodox service: Not on file     Active member of club or organization: Not on file     Attends meetings of clubs or organizations: Not on file     Relationship status: Not on file     Intimate partner violence:     Fear of current or ex partner: Not on file     Emotionally abused: Not on file     Physically abused: Not on file     Forced sexual activity: Not on file   Other Topics Concern     Parent/sibling w/ CABG, MI or angioplasty before 65F 55M? Not Asked   Social History Narrative     Not on file     History reviewed. No pertinent family history.     ROS: 10 point ROS neg other than the symptoms noted above in the HPI.          /76 (BP Location: Right arm, Patient Position: Sitting, Cuff Size: Adult Large)   Pulse 80   Wt 86.4 kg (190 lb 8 oz)   LMP 09/27/2019 (Exact Date)   BMI 32.29 kg/m          UPT today is positive      ASSESSMENT:        (N91.2) Amenorrhea  (primary encounter diagnosis)  Comment:   Plan: HCG Qual, Urine (RGI9805)            (N92.6) Irregular menses  Comment:   Plan: HCG Quantitative Pregnancy, Blood ([id number])            (Z32.01) Pregnancy test positive  Comment:   Plan: HCG Quantitative Pregnancy, Blood (ZKA241)          Will plan quant today and perhaps again on Friday depending on level.  Then ultrasound to confirm dates.         We reviewed:  CMV  Listeria precautions  Zika   Nausea remedies (see AVS for complete instructions)    Recommended PNV daily and following a generally healthy lifestyle.  Ultrasound at 7 weeks.   To call with protracted n/v or vaginal bleeding  Follow up visit at 8-10 weeks gestation

## 2019-11-06 NOTE — NURSING NOTE
"Chief Complaint   Patient presents with     Confirmation Of Pregnancy       Initial /76 (BP Location: Right arm, Patient Position: Sitting, Cuff Size: Adult Large)   Pulse 80   Wt 86.4 kg (190 lb 8 oz)   LMP 09/27/2019 (Exact Date)   BMI 32.29 kg/m   Estimated body mass index is 32.29 kg/m  as calculated from the following:    Height as of 6/6/19: 1.636 m (5' 4.41\").    Weight as of this encounter: 86.4 kg (190 lb 8 oz).  Medication Reconciliation: complete    Cindy Gray CMA    "

## 2019-11-11 ENCOUNTER — MYC MEDICAL ADVICE (OUTPATIENT)
Dept: OBGYN | Facility: OTHER | Age: 29
End: 2019-11-11

## 2019-11-25 ENCOUNTER — PRENATAL OFFICE VISIT (OUTPATIENT)
Dept: OBGYN | Facility: OTHER | Age: 29
End: 2019-11-25
Payer: COMMERCIAL

## 2019-11-25 ENCOUNTER — ANCILLARY PROCEDURE (OUTPATIENT)
Dept: ULTRASOUND IMAGING | Facility: OTHER | Age: 29
End: 2019-11-25
Attending: ADVANCED PRACTICE MIDWIFE
Payer: COMMERCIAL

## 2019-11-25 VITALS
BODY MASS INDEX: 30.99 KG/M2 | WEIGHT: 186 LBS | DIASTOLIC BLOOD PRESSURE: 68 MMHG | SYSTOLIC BLOOD PRESSURE: 118 MMHG | HEART RATE: 98 BPM | HEIGHT: 65 IN

## 2019-11-25 DIAGNOSIS — N92.6 IRREGULAR MENSES: ICD-10-CM

## 2019-11-25 DIAGNOSIS — Z34.91 NORMAL PREGNANCY IN FIRST TRIMESTER: Primary | ICD-10-CM

## 2019-11-25 DIAGNOSIS — Z32.01 PREGNANCY TEST POSITIVE: ICD-10-CM

## 2019-11-25 DIAGNOSIS — R11.0 NAUSEA: ICD-10-CM

## 2019-11-25 LAB
ABO + RH BLD: NORMAL
ABO + RH BLD: NORMAL
BASOPHILS # BLD AUTO: 0 10E9/L (ref 0–0.2)
BASOPHILS NFR BLD AUTO: 0.4 %
BLD GP AB SCN SERPL QL: NORMAL
BLOOD BANK CMNT PATIENT-IMP: NORMAL
DIFFERENTIAL METHOD BLD: NORMAL
EOSINOPHIL # BLD AUTO: 0.1 10E9/L (ref 0–0.7)
EOSINOPHIL NFR BLD AUTO: 1.1 %
ERYTHROCYTE [DISTWIDTH] IN BLOOD BY AUTOMATED COUNT: 12.2 % (ref 10–15)
HCT VFR BLD AUTO: 39.2 % (ref 35–47)
HGB BLD-MCNC: 13.4 G/DL (ref 11.7–15.7)
LYMPHOCYTES # BLD AUTO: 3.3 10E9/L (ref 0.8–5.3)
LYMPHOCYTES NFR BLD AUTO: 33.2 %
MCH RBC QN AUTO: 31.1 PG (ref 26.5–33)
MCHC RBC AUTO-ENTMCNC: 34.2 G/DL (ref 31.5–36.5)
MCV RBC AUTO: 91 FL (ref 78–100)
MONOCYTES # BLD AUTO: 0.5 10E9/L (ref 0–1.3)
MONOCYTES NFR BLD AUTO: 5.1 %
NEUTROPHILS # BLD AUTO: 6 10E9/L (ref 1.6–8.3)
NEUTROPHILS NFR BLD AUTO: 60.2 %
PLATELET # BLD AUTO: 368 10E9/L (ref 150–450)
RBC # BLD AUTO: 4.31 10E12/L (ref 3.8–5.2)
SPECIMEN EXP DATE BLD: NORMAL
WBC # BLD AUTO: 9.9 10E9/L (ref 4–11)

## 2019-11-25 PROCEDURE — 99207 ZZC FIRST OB VISIT: CPT | Performed by: OBSTETRICS & GYNECOLOGY

## 2019-11-25 PROCEDURE — 87591 N.GONORRHOEAE DNA AMP PROB: CPT | Performed by: OBSTETRICS & GYNECOLOGY

## 2019-11-25 PROCEDURE — 86900 BLOOD TYPING SEROLOGIC ABO: CPT | Performed by: OBSTETRICS & GYNECOLOGY

## 2019-11-25 PROCEDURE — 86901 BLOOD TYPING SEROLOGIC RH(D): CPT | Performed by: OBSTETRICS & GYNECOLOGY

## 2019-11-25 PROCEDURE — 86762 RUBELLA ANTIBODY: CPT | Performed by: OBSTETRICS & GYNECOLOGY

## 2019-11-25 PROCEDURE — 76801 OB US < 14 WKS SINGLE FETUS: CPT

## 2019-11-25 PROCEDURE — 86780 TREPONEMA PALLIDUM: CPT | Performed by: OBSTETRICS & GYNECOLOGY

## 2019-11-25 PROCEDURE — 87491 CHLMYD TRACH DNA AMP PROBE: CPT | Performed by: OBSTETRICS & GYNECOLOGY

## 2019-11-25 PROCEDURE — 87340 HEPATITIS B SURFACE AG IA: CPT | Performed by: OBSTETRICS & GYNECOLOGY

## 2019-11-25 PROCEDURE — 85025 COMPLETE CBC W/AUTO DIFF WBC: CPT | Performed by: OBSTETRICS & GYNECOLOGY

## 2019-11-25 PROCEDURE — 36415 COLL VENOUS BLD VENIPUNCTURE: CPT | Performed by: OBSTETRICS & GYNECOLOGY

## 2019-11-25 PROCEDURE — 87086 URINE CULTURE/COLONY COUNT: CPT | Performed by: OBSTETRICS & GYNECOLOGY

## 2019-11-25 PROCEDURE — 86850 RBC ANTIBODY SCREEN: CPT | Performed by: OBSTETRICS & GYNECOLOGY

## 2019-11-25 PROCEDURE — 87389 HIV-1 AG W/HIV-1&-2 AB AG IA: CPT | Performed by: OBSTETRICS & GYNECOLOGY

## 2019-11-25 RX ORDER — ASCORBIC ACID 500 MG
POWDER IN PACKET (EA) ORAL
COMMUNITY

## 2019-11-25 RX ORDER — ONDANSETRON 4 MG/1
4 TABLET, FILM COATED ORAL EVERY 8 HOURS PRN
Qty: 30 TABLET | Refills: 1 | Status: SHIPPED | OUTPATIENT
Start: 2019-11-25 | End: 2020-05-04

## 2019-11-25 ASSESSMENT — MIFFLIN-ST. JEOR: SCORE: 1557.69

## 2019-11-25 NOTE — PATIENT INSTRUCTIONS
If you have any questions regarding your visit, Please contact your care team.    [facility] CLINIC HOURS TELEPHONE NUMBER   Annette Mckeon M.D.    Blair Sargent           Monday-[facility]  7:00 am - 5 pm Monday's Tuesday- [facility]  8:00am- 5 pm  Wednesday- Off  Thursday- Off Surgery  Friday-Am [place], and [facility]  [place] 8:00-11:30 AM  [place] 1:00-5:00 PM [facility]  [address] [place] [address]  [phone] ask for [facility]    Imaging [phone]    [facility]  [address] [place], [place] [id number] [phone]  Imaging [phone]    [facility]  [address] [place], [place] [id number] [phone]  Imaging Scheduling - [phone]     Urgent Care locations:    [facility] Saturday and Sunday   9 am - 5 pm    Monday-Friday   12 pm - 8 pm  Saturday and Sunday   9 am - 5 pm   [phone]       If you need a medication refill, please contact your pharmacy. Please allow 3 business days for your refill to be completed.  As always, Thank you for trusting us with your healthcare needs!

## 2019-11-25 NOTE — PROGRESS NOTES
HPI:    Binta is a 29 year old yo  at 8 3/7 weeks by LMP who presents today for her initial OB visit.  Last pap smear was in 2018 and was normal.    Issues:  Nausea    Past OB Hx: N/A     Father of baby: No health issues       History reviewed. No pertinent past medical history.          History reviewed. No pertinent surgical history.     History reviewed. No pertinent family history.     Social History     Socioeconomic History     Marital status:      Spouse name: Not on file     Number of children: Not on file     Years of education: Not on file     Highest education level: Not on file   Occupational History     Not on file   Social Needs     Financial resource strain: Not on file     Food insecurity:     Worry: Not on file     Inability: Not on file     Transportation needs:     Medical: Not on file     Non-medical: Not on file   Tobacco Use     Smoking status: Never Smoker     Smokeless tobacco: Never Used   Substance and Sexual Activity     Alcohol use: Yes     Comment: occ-not in PG     Drug use: No     Sexual activity: Yes     Partners: Male     Birth control/protection: None   Lifestyle     Physical activity:     Days per week: Not on file     Minutes per session: Not on file     Stress: Not on file   Relationships     Social connections:     Talks on phone: Not on file     Gets together: Not on file     Attends Yarsanism service: Not on file     Active member of club or organization: Not on file     Attends meetings of clubs or organizations: Not on file     Relationship status: Not on file     Intimate partner violence:     Fear of current or ex partner: Not on file     Emotionally abused: Not on file     Physically abused: Not on file     Forced sexual activity: Not on file   Other Topics Concern     Parent/sibling w/ CABG, MI or angioplasty before 65F 55M? Not Asked   Social History Narrative     Not on file         Current Outpatient Medications:      Ascorbic Acid (VITAMIN C) 500 MG PACK,  , Disp: , Rfl:      FIBER ADULT GUMMIES PO, , Disp: , Rfl:      Prenatal Vit-Fe Fumarate-FA (PRENATAL VITAMIN PO), , Disp: , Rfl:       Objective:  Physical Exam:   Breast:  Benign exam, no masses palpated.  No skin changes, no axillary lymphadenopathy, no nipple discharge.  Axilla feel completely normal, no lymph node enlargement and non-tender.  Heart=RRR, no murmurs  Thyroid=normal, no masses, no TTP  Lungs=Clear to ascultation bilateral, non-labored breathing  Abd=soft, Nontender/nondistended, +bowel sounds x4  PELVIC:    External genitalia: normal without lesion  Vagina: normal mucosa and rugae, no discharge.  Cervix: normal without lesion, healthy, nulliparous, GC and Chlamydia obtained  Uterus: small, mobile, nontender.  Adnexa: non tender, without masses  Rectal: deffered  Ext=no clubbing or cyanosis  FXOd=626 by ultrasound today    Assessment:   1.  IUP at 8 3/7 weeks here for her initial OB visit    Plan:    1.  PNV  2.  NOB Labs   3.  Discussed routine prenatal care, quad screen, GCT, anatomy scan at ~19 weeks, frequency of visits.  4.  Discussed first trimester screen and she will think about it and let me know  5.  Delivery hospital: [facility]  6.  RTC 4 weeks.  7.  Nausea=rx for Zofran      Discussed physician coverage, tertiary support, diet, exercise, weight gain, schedule of visits, routine and indicated ultrasounds, and childbirth education.    Options for  testing for chromosomal and birth defects were discussed with the patient. Diagnostic tests include CVS and Amniocentesis. We discussed that these tests are definitive but invasive and do carry a risk of fetal loss.   Screening tests include nuchal translucency/blood marker testing in the first trimester and quad screening in the second trimester. We discussed that these are screening tests and not diagnostic tests and that false positives and negatives are a distinct possibility.     25 minutes was spent face to face with the patient today  discussing her history, diagnosis, and follow-up plan as noted above.  Over 50% of the visit was spent in counseling and coordination of care.    Total Visit Time: 30 minutes      Annette Mckeon DO

## 2019-11-26 LAB
BACTERIA SPEC CULT: NORMAL
C TRACH DNA SPEC QL NAA+PROBE: NEGATIVE
HBV SURFACE AG SERPL QL IA: NONREACTIVE
HIV 1+2 AB+HIV1 P24 AG SERPL QL IA: NONREACTIVE
Lab: NORMAL
N GONORRHOEA DNA SPEC QL NAA+PROBE: NEGATIVE
RUBV IGG SERPL IA-ACNC: 17 IU/ML
SPECIMEN SOURCE: NORMAL
T PALLIDUM AB SER QL: NONREACTIVE

## 2019-12-18 ENCOUNTER — MYC MEDICAL ADVICE (OUTPATIENT)
Dept: OBGYN | Facility: OTHER | Age: 29
End: 2019-12-18

## 2019-12-30 ENCOUNTER — PRENATAL OFFICE VISIT (OUTPATIENT)
Dept: OBGYN | Facility: OTHER | Age: 29
End: 2019-12-30
Payer: COMMERCIAL

## 2019-12-30 VITALS
HEART RATE: 104 BPM | BODY MASS INDEX: 31.54 KG/M2 | SYSTOLIC BLOOD PRESSURE: 112 MMHG | DIASTOLIC BLOOD PRESSURE: 68 MMHG | WEIGHT: 187 LBS

## 2019-12-30 DIAGNOSIS — Z34.92 NORMAL PREGNANCY IN SECOND TRIMESTER: Primary | ICD-10-CM

## 2019-12-30 PROCEDURE — 99207 ZZC PRENATAL VISIT: CPT | Performed by: OBSTETRICS & GYNECOLOGY

## 2019-12-30 NOTE — PROGRESS NOTES
29 year old  at 12w3d weeks presents to the clinic for a routine prenatal visit.  Feeling well.  No vaginal bleeding, leakage of fluid, or contractions   Fundal height=s=d  KXOi=168  Discussed quad screen and she declines  Will schedule anatomy ultrasound.  RTC 4 weeks.    Annette Mckeon DO

## 2019-12-30 NOTE — PATIENT INSTRUCTIONS
If you have any questions regarding your visit, Please contact your care team.    [facility] CLINIC HOURS TELEPHONE NUMBER   Annette Mckeon M.D.    Blair Sargent           Monday-[facility]  7:00 am - 5 pm Monday's Tuesday- [facility]  8:00am- 5 pm  Wednesday- Off  Thursday- Off Surgery  Friday-Am [place], and [facility]  [place] 8:00-11:30 AM  [place] 1:00-5:00 PM [facility]  [address] [place] [address]  [phone] ask for [facility]    Imaging [phone]    [facility]  [address] [place], [place] [id number] [phone]  Imaging [phone]    [facility]  [address] [place], [place] [id number] [phone]  Imaging Scheduling - [phone]     Urgent Care locations:    [facility] Saturday and Sunday   9 am - 5 pm    Monday-Friday   12 pm - 8 pm  Saturday and Sunday   9 am - 5 pm   [phone]       If you need a medication refill, please contact your pharmacy. Please allow 3 business days for your refill to be completed.  As always, Thank you for trusting us with your healthcare needs!

## 2020-01-27 ENCOUNTER — PRENATAL OFFICE VISIT (OUTPATIENT)
Dept: OBGYN | Facility: OTHER | Age: 30
End: 2020-01-27
Payer: COMMERCIAL

## 2020-01-27 VITALS
WEIGHT: 188.75 LBS | HEART RATE: 98 BPM | BODY MASS INDEX: 31.83 KG/M2 | DIASTOLIC BLOOD PRESSURE: 64 MMHG | SYSTOLIC BLOOD PRESSURE: 120 MMHG

## 2020-01-27 DIAGNOSIS — Z34.92 NORMAL PREGNANCY IN SECOND TRIMESTER: Primary | ICD-10-CM

## 2020-01-27 PROCEDURE — 99207 ZZC PRENATAL VISIT: CPT | Performed by: OBSTETRICS & GYNECOLOGY

## 2020-01-27 NOTE — NURSING NOTE
"Chief Complaint   Patient presents with     Prenatal Care     16w3d       Initial /64 (BP Location: Right arm, Cuff Size: Adult Regular)   Pulse 98   Wt 85.6 kg (188 lb 12 oz)   LMP 2019 (Exact Date)   BMI 31.83 kg/m   Estimated body mass index is 31.83 kg/m  as calculated from the following:    Height as of 19: 1.64 m (5' 4.57\").    Weight as of this encounter: 85.6 kg (188 lb 12 oz).  BP completed using cuff size: regular        PHQ-9 score:    PHQ-9 SCORE 2019   PHQ-9 Total Score 0       Breanne Jorge MA on 2020 at 3:06 PM      "

## 2020-01-27 NOTE — PROGRESS NOTES
Presents for routine  appointment.     No complaints aside from low back pain  No LOF/VB/Ctxs.    ROS:   and GI  negative.     Please see Prenatal Vitals and Notes Flowsheet for objective data.    A/P:  29 year old  at 16w3d       ICD-10-CM    1. Normal pregnancy in second trimester Z34.92 US OB > 14 Weeks       Genetic screening declined.   20 week ultrasound ordered  Follow up in 4  week(s).      Breanne Mckeon MD

## 2020-02-24 ENCOUNTER — PRENATAL OFFICE VISIT (OUTPATIENT)
Dept: OBGYN | Facility: OTHER | Age: 30
End: 2020-02-24
Payer: COMMERCIAL

## 2020-02-24 ENCOUNTER — ANCILLARY PROCEDURE (OUTPATIENT)
Dept: ULTRASOUND IMAGING | Facility: OTHER | Age: 30
End: 2020-02-24
Attending: OBSTETRICS & GYNECOLOGY
Payer: COMMERCIAL

## 2020-02-24 VITALS
HEART RATE: 86 BPM | BODY MASS INDEX: 32.13 KG/M2 | WEIGHT: 190.5 LBS | SYSTOLIC BLOOD PRESSURE: 110 MMHG | DIASTOLIC BLOOD PRESSURE: 70 MMHG

## 2020-02-24 DIAGNOSIS — Z34.92 NORMAL PREGNANCY IN SECOND TRIMESTER: Primary | ICD-10-CM

## 2020-02-24 DIAGNOSIS — Z34.92 NORMAL PREGNANCY IN SECOND TRIMESTER: ICD-10-CM

## 2020-02-24 PROCEDURE — 76805 OB US >/= 14 WKS SNGL FETUS: CPT

## 2020-02-24 PROCEDURE — 99207 ZZC PRENATAL VISIT: CPT | Performed by: OBSTETRICS & GYNECOLOGY

## 2020-02-24 NOTE — NURSING NOTE
"Chief Complaint   Patient presents with     Prenatal Care     20w3d       Initial /70 (BP Location: Left arm, Cuff Size: Adult Regular)   Pulse 86   Wt 86.4 kg (190 lb 8 oz)   LMP 2019 (Exact Date)   BMI 32.13 kg/m   Estimated body mass index is 32.13 kg/m  as calculated from the following:    Height as of 19: 1.64 m (5' 4.57\").    Weight as of this encounter: 86.4 kg (190 lb 8 oz).  BP completed using cuff size: regular        PHQ-9 score:    PHQ-9 SCORE 2019   PHQ-9 Total Score 0       Breanne Jorge MA on 2020 at 2:09 PM        "

## 2020-02-24 NOTE — PATIENT INSTRUCTIONS
What to watch out for are: regular contractions every 5 min, vaginal bleeding, decreased fetal movement, or leakage of fluid.  Please call the office or go to L&D if you develop any of these signs and symptoms.      I will see you for your follow up appointment.  Please feel free to call if you have any questions or concerns.      Thanks,  Annette Mckeon, DO

## 2020-02-24 NOTE — PROGRESS NOTES
29 year old  at 20w3d weeks presents to the clinic for a routine prenatal visit.  No concerns.  No leakage of fluid, vaginal bleeding, or contractions   Good fetal movement.  FHTs: 153  Fundal height:  s=d  Anatomy ultrasound done today.  In process  RTC 4 weeks    Annette Mckeon DO

## 2020-02-25 DIAGNOSIS — Z34.92 NORMAL PREGNANCY IN SECOND TRIMESTER: ICD-10-CM

## 2020-02-25 DIAGNOSIS — Z34.91 NORMAL PREGNANCY IN FIRST TRIMESTER: Primary | ICD-10-CM

## 2020-03-02 ENCOUNTER — NURSE TRIAGE (OUTPATIENT)
Dept: OBGYN | Facility: CLINIC | Age: 30
End: 2020-03-02

## 2020-03-02 NOTE — TELEPHONE ENCOUNTER
Reason for call:  Symptom  Reason for call:  Patient reporting a symptom    Symptom or request: Cough and cold, 22 weeks pregnant and wondering what she can take or if she should be seen    Duration (how long have symptoms been present): 4 days    Have you been treated for this before? No    Additional comments: Had a fever that broke last night, not sure what she can or should take to help with cough and congestion. Please call to advise.     Phone Number patient can be reached at:  Cell number on file:    Telephone Information:   Mobile [phone]       Best Time:  Any    Can we leave a detailed message on this number:  YES    Call taken on 3/2/2020 at 7:35 AM by Avril Marcos

## 2020-03-02 NOTE — TELEPHONE ENCOUNTER
Spoke to patient.     Saturday had a fever of 101, fever broke.     Cough started Thursday and progressed.   Sinus congestion.   Runny nose.   Coughs up a little mucous, clear for the most part.       22 weeks pregnant.     Rest and fluids.   Is a teacher.   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   Humidifer.   Lots of warm fluids.     RN advised home care and to call back if symptoms worsen. Patient states understanding.     Aurea Esparza RN BSN      Additional Information    Negative: Severe difficulty breathing (e.g., struggling for each breath, speaks in single words)    Negative: Very weak (can't stand)    Negative: Sounds like a life-threatening emergency to the triager    Negative: Runny nose is caused by pollen or other allergies    Negative: Cough is the main symptom    Negative: Sore throat is the main symptom    Negative: Patient sounds very sick or weak to the triager    Negative: Fever > 103 F (39.4 C)    Negative: Fever > 101 F (38.3 C) and over 60 years of age    Negative: Fever > 100.0 F (37.8 C) and has diabetes mellitus or a weak immune system (e.g., HIV positive, cancer chemotherapy, organ transplant, splenectomy, chronic steroids)    Negative: Fever > 100.0 F (37.8 C) and bedridden (e.g., nursing home patient, stroke, chronic illness, recovering from surgery)    Negative: Fever present > 3 days (72 hours)    Negative: Fever returns after gone for over 24 hours and symptoms worse or not improved    Negative: Sinus pain (not just congestion) and fever    Negative: Earache    Negative: Sinus congestion (pressure, fullness) present > 10 days    Negative: Nasal discharge present > 10 days    Negative: Using nasal washes and pain medicine > 24 hours and sinus pain (lower forehead, cheekbone, or eye) persists    Negative: Patient wants to be seen    Negative: Sore throat present > 5 days    Colds with no complications    Protocols used: COMMON COLD-A-OH

## 2020-03-23 ENCOUNTER — PRENATAL OFFICE VISIT (OUTPATIENT)
Dept: OBGYN | Facility: OTHER | Age: 30
End: 2020-03-23
Payer: COMMERCIAL

## 2020-03-23 ENCOUNTER — ANCILLARY PROCEDURE (OUTPATIENT)
Dept: ULTRASOUND IMAGING | Facility: OTHER | Age: 30
End: 2020-03-23
Attending: OBSTETRICS & GYNECOLOGY
Payer: COMMERCIAL

## 2020-03-23 VITALS
HEART RATE: 88 BPM | DIASTOLIC BLOOD PRESSURE: 64 MMHG | BODY MASS INDEX: 31.96 KG/M2 | WEIGHT: 189.5 LBS | SYSTOLIC BLOOD PRESSURE: 108 MMHG

## 2020-03-23 DIAGNOSIS — Z34.92 NORMAL PREGNANCY IN SECOND TRIMESTER: ICD-10-CM

## 2020-03-23 DIAGNOSIS — Z34.92 NORMAL PREGNANCY IN SECOND TRIMESTER: Primary | ICD-10-CM

## 2020-03-23 LAB
GLUCOSE 1H P 50 G GLC PO SERPL-MCNC: 116 MG/DL (ref 60–129)
HGB BLD-MCNC: 11.8 G/DL (ref 11.7–15.7)

## 2020-03-23 PROCEDURE — 82950 GLUCOSE TEST: CPT | Performed by: OBSTETRICS & GYNECOLOGY

## 2020-03-23 PROCEDURE — 99207 ZZC PRENATAL VISIT: CPT | Performed by: OBSTETRICS & GYNECOLOGY

## 2020-03-23 PROCEDURE — 76816 OB US FOLLOW-UP PER FETUS: CPT

## 2020-03-23 PROCEDURE — 36415 COLL VENOUS BLD VENIPUNCTURE: CPT | Performed by: OBSTETRICS & GYNECOLOGY

## 2020-03-23 PROCEDURE — 00000218 ZZHCL STATISTIC OBHBG - HEMOGLOBIN: Performed by: OBSTETRICS & GYNECOLOGY

## 2020-03-23 NOTE — PROGRESS NOTES
29 year old  at 24w3d weeks presents to the clinic for a routine prenatal visit.  No concerns.  No vaginal bleeding, leakage of fluid, or contractions   Good fetal movement.  QCYn=925   Fundal height=25cm    Normal anatomy ultrasound. Some anatomy was not well seen. Repeat ultrasound was done today.  In process.  RTC 4 weeks  GCT today  Blood type:A+    Annette Mckeon DO

## 2020-03-23 NOTE — NURSING NOTE
"Chief Complaint   Patient presents with     Prenatal Care     24w3d       Initial /64 (BP Location: Right arm, Cuff Size: Adult Regular)   Pulse 88   Wt 86 kg (189 lb 8 oz)   LMP 2019 (Exact Date)   BMI 31.96 kg/m   Estimated body mass index is 31.96 kg/m  as calculated from the following:    Height as of 19: 1.64 m (5' 4.57\").    Weight as of this encounter: 86 kg (189 lb 8 oz).  BP completed using cuff size: regular        PHQ-9 score:    PHQ-9 SCORE 2019   PHQ-9 Total Score 0         Breanne Jorge MA on 3/23/2020 at 11:15 AM    "

## 2020-04-20 ENCOUNTER — VIRTUAL VISIT (OUTPATIENT)
Dept: OBGYN | Facility: CLINIC | Age: 30
End: 2020-04-20
Payer: COMMERCIAL

## 2020-04-20 DIAGNOSIS — Z34.92 NORMAL PREGNANCY IN SECOND TRIMESTER: ICD-10-CM

## 2020-04-20 DIAGNOSIS — Z23 NEED FOR TDAP VACCINATION: ICD-10-CM

## 2020-04-20 DIAGNOSIS — Z34.00 SUPERVISION OF NORMAL FIRST PREGNANCY, ANTEPARTUM: Primary | ICD-10-CM

## 2020-04-20 PROCEDURE — 99207 ZZC PRENATAL VISIT: CPT | Performed by: OBSTETRICS & GYNECOLOGY

## 2020-04-20 NOTE — PROGRESS NOTES
"Binta Peterson is a 29 year old female who is being evaluated via a billable telephone visit.      The patient has been notified of following:     \"This telephone visit will be conducted via a call between you and your physician/provider. We have found that certain health care needs can be provided without the need for a physical exam.  This service lets us provide the care you need with a short phone conversation.  If a prescription is necessary we can send it directly to your pharmacy.  If lab work is needed we can place an order for that and you can then stop by our lab to have the test done at a later time.    Telephone visits are billed at different rates depending on your insurance coverage. During this emergency period, for some insurers they may be billed the same as an in-person visit.  Please reach out to your insurance provider with any questions.    If during the course of the call the physician/provider feels a telephone visit is not appropriate, you will not be charged for this service.\"      How would you like to obtain your AVS? MyChart              "

## 2020-04-20 NOTE — PROGRESS NOTES
Patient presents for routine prenatal visit at 28w3d.  Patient without complaint. Baby is moving well.  PE: See OB vitals  There is no height or weight on file to calculate BMI.    Doing well.  No concerns today.  No vaginal bleeding, loss of fluid, or contractions    Questions asked and answered.      Isra Jane MD FACOG

## 2020-05-04 ENCOUNTER — PRENATAL OFFICE VISIT (OUTPATIENT)
Dept: OBGYN | Facility: CLINIC | Age: 30
End: 2020-05-04
Payer: COMMERCIAL

## 2020-05-04 VITALS
SYSTOLIC BLOOD PRESSURE: 120 MMHG | DIASTOLIC BLOOD PRESSURE: 84 MMHG | BODY MASS INDEX: 32.53 KG/M2 | WEIGHT: 192.9 LBS | HEART RATE: 118 BPM

## 2020-05-04 DIAGNOSIS — Z34.93 NORMAL PREGNANCY IN THIRD TRIMESTER: Primary | ICD-10-CM

## 2020-05-04 DIAGNOSIS — Z23 NEED FOR VACCINATION: ICD-10-CM

## 2020-05-04 PROCEDURE — 90471 IMMUNIZATION ADMIN: CPT | Performed by: OBSTETRICS & GYNECOLOGY

## 2020-05-04 PROCEDURE — 99207 ZZC PRENATAL VISIT: CPT | Performed by: OBSTETRICS & GYNECOLOGY

## 2020-05-04 PROCEDURE — 90715 TDAP VACCINE 7 YRS/> IM: CPT | Performed by: OBSTETRICS & GYNECOLOGY

## 2020-05-04 NOTE — NURSING NOTE
"Chief Complaint   Patient presents with     Prenatal Care     30w3d       Initial /84 (BP Location: Right arm, Cuff Size: Adult Regular)   Pulse 118   Wt 87.5 kg (192 lb 14.4 oz)   LMP 2019 (Exact Date)   BMI 32.53 kg/m   Estimated body mass index is 32.53 kg/m  as calculated from the following:    Height as of 19: 1.64 m (5' 4.57\").    Weight as of this encounter: 87.5 kg (192 lb 14.4 oz).  BP completed using cuff size: regular              Breanne Jorge MA on 2020 at 8:19 AM    "

## 2020-05-04 NOTE — PROGRESS NOTES
Presents for routine  appointment.     No complaints aside from worsening varicosities in her legs.    No abnormal discharge , no leaking fluid , no contractions , no vaginal bleeding    ROS:   and GI  negative.     Please see Prenatal Vitals and Notes Flowsheet for objective data.  Hemoglobin   Date Value Ref Range Status   2020 11.8 11.7 - 15.7 g/dL Final     Lab Results   Component Value Date    ABO A 2019    RH Pos 2019       A/P:  29 year old  at 30w3d       ICD-10-CM    1. Normal pregnancy in third trimester  Z34.93    2. Need for vaccination  Z23 1st  Administration  [69422]     TDAP VACCINE (ADACEL) [89564.002]       GCT Normal  TDAP today.    Rhogam: not  needed  Discussed kick counts   Compression stockings discussed- she will start using the full pregnancy support hose.   Follow up in 2 weeks.      Breanne Mckeon MD

## 2020-05-04 NOTE — NURSING NOTE
Prior to immunization administration, verified patients identity using patient s name and date of birth. Please see Immunization Activity for additional information.     Screening Questionnaire for Adult Immunization    Are you sick today?   No   Do you have allergies to medications, food, a vaccine component or latex?   No   Have you ever had a serious reaction after receiving a vaccination?   No   Do you have a long-term health problem with heart, lung, kidney, or metabolic disease (e.g., diabetes), asthma, a blood disorder, no spleen, complement component deficiency, a cochlear implant, or a spinal fluid leak?  Are you on long-term aspirin therapy?   No   Do you have cancer, leukemia, HIV/AIDS, or any other immune system problem?   No   Do you have a parent, brother, or sister with an immune system problem?   No   In the past 3 months, have you taken medications that affect  your immune system, such as prednisone, other steroids, or anticancer drugs; drugs for the treatment of rheumatoid arthritis, Crohn s disease, or psoriasis; or have you had radiation treatments?   No   Have you had a seizure, or a brain or other nervous system problem?   No   During the past year, have you received a transfusion of blood or blood    products, or been given immune (gamma) globulin or antiviral drug?   No   For women: Are you pregnant or is there a chance you could become       pregnant during the next month?   Yes   Have you received any vaccinations in the past 4 weeks?   No     Immunization questionnaire was positive for at least one answer.  Notified .        Per orders of Dr. Mckeon, injection of TDAP (Adacel) given by Breanne Jorge MA. Patient instructed to remain in clinic for 15 minutes afterwards, and to report any adverse reaction to me immediately.       Screening performed by Breanne Jorge MA on 5/4/2020 at 8:24 AM.

## 2020-05-06 ENCOUNTER — MYC MEDICAL ADVICE (OUTPATIENT)
Dept: OBGYN | Facility: OTHER | Age: 30
End: 2020-05-06

## 2020-05-06 NOTE — LETTER
May 8, 2020      Binta Peterson  [address] [address] [facility] [address]        To Whom it May Concern,         Binta Peterson has been seen and treated at our clinic.  Patient is pregnant and her expected due date is 7/10/2020. Feel free to contact me with any questions or concerns.        Sincerely,        Annette Mckeon, DO

## 2020-05-19 ENCOUNTER — VIRTUAL VISIT (OUTPATIENT)
Dept: OBGYN | Facility: CLINIC | Age: 30
End: 2020-05-19
Payer: COMMERCIAL

## 2020-05-19 DIAGNOSIS — Z34.93 NORMAL PREGNANCY IN THIRD TRIMESTER: Primary | ICD-10-CM

## 2020-05-19 DIAGNOSIS — Z34.00 SUPERVISION OF NORMAL FIRST PREGNANCY, ANTEPARTUM: ICD-10-CM

## 2020-05-19 PROBLEM — Z34.92 NORMAL PREGNANCY IN SECOND TRIMESTER: Status: RESOLVED | Noted: 2019-11-25 | Resolved: 2020-05-19

## 2020-05-19 PROCEDURE — 99207 ZZC PRENATAL VISIT: CPT | Performed by: OBSTETRICS & GYNECOLOGY

## 2020-05-19 NOTE — PROGRESS NOTES
"Binta Peterson is a 29 year old female who is being evaluated via a billable telephone visit.      The patient has been notified of following:     \"This telephone visit will be conducted via a call between you and your physician/provider. We have found that certain health care needs can be provided without the need for a physical exam.  This service lets us provide the care you need with a short phone conversation.  If a prescription is necessary we can send it directly to your pharmacy.  If lab work is needed we can place an order for that and you can then stop by our lab to have the test done at a later time.    Telephone visits are billed at different rates depending on your insurance coverage. During this emergency period, for some insurers they may be billed the same as an in-person visit.  Please reach out to your insurance provider with any questions.    If during the course of the call the physician/provider feels a telephone visit is not appropriate, you will not be charged for this service.\"    Patient has given verbal consent for Telephone visit?  Yes    What phone number would you like to be contacted at? [phone]    Phone call duration: 15 minutes    No significant signs, symptoms or concerns except some sciatica.    Total encounter time (physician together with patient) = 15min. Direct counseling, education and care coordination time (physician together with patient) = 10min. This counseling included the following: Advice appropriate to gestational age reviewed including pertinent Checklist items. Discussed condition, tests and care plan including risks, benefits, and alternatives. Problem list updated.   A/P:  Binta was seen today for prenatal care.    Diagnoses and all orders for this visit:    Normal pregnancy in third trimester    Supervision of normal first pregnancy, antepartum        Jesus Diego MD               "

## 2020-06-02 ENCOUNTER — PRENATAL OFFICE VISIT (OUTPATIENT)
Dept: OBGYN | Facility: CLINIC | Age: 30
End: 2020-06-02
Payer: COMMERCIAL

## 2020-06-02 VITALS
SYSTOLIC BLOOD PRESSURE: 133 MMHG | OXYGEN SATURATION: 99 % | BODY MASS INDEX: 32.95 KG/M2 | HEART RATE: 104 BPM | WEIGHT: 195.4 LBS | DIASTOLIC BLOOD PRESSURE: 86 MMHG

## 2020-06-02 DIAGNOSIS — Z34.00 SUPERVISION OF NORMAL FIRST PREGNANCY, ANTEPARTUM: Primary | ICD-10-CM

## 2020-06-02 PROCEDURE — 99207 ZZC PRENATAL VISIT: CPT | Performed by: OBSTETRICS & GYNECOLOGY

## 2020-06-02 RX ORDER — OMEGA-3/DHA/EPA/FISH OIL 60 MG-90MG
1 CAPSULE ORAL DAILY
COMMUNITY
End: 2022-03-11

## 2020-06-02 NOTE — PROGRESS NOTES
29 year old  at 34w4d weeks presents to the clinic for a routine prenatal visit.    No concerns.  Patient denies any vaginal bleeding, leakage of fluid, or contractions     Good fetal movement  Fundal height=34cm  ZPSd=483  JY=088/86.  Patient complains of headaches in the morning.  She is not sleeping well.  She does not need to take anything for them and they go away quickly.  No vision changes.  No N/V.  No RUQ or epigastric pain.    Discussed labor precautions.  RTC 2 weeks    Annette Mckeon DO

## 2020-06-15 ENCOUNTER — PRENATAL OFFICE VISIT (OUTPATIENT)
Dept: OBGYN | Facility: CLINIC | Age: 30
End: 2020-06-15
Payer: COMMERCIAL

## 2020-06-15 VITALS
WEIGHT: 195.1 LBS | DIASTOLIC BLOOD PRESSURE: 91 MMHG | SYSTOLIC BLOOD PRESSURE: 134 MMHG | BODY MASS INDEX: 32.9 KG/M2 | OXYGEN SATURATION: 100 % | HEART RATE: 128 BPM

## 2020-06-15 DIAGNOSIS — Z34.00 SUPERVISION OF NORMAL FIRST PREGNANCY, ANTEPARTUM: Primary | ICD-10-CM

## 2020-06-15 DIAGNOSIS — R03.0 ELEVATED BLOOD PRESSURE READING WITHOUT DIAGNOSIS OF HYPERTENSION: ICD-10-CM

## 2020-06-15 LAB
ALBUMIN SERPL-MCNC: 2.7 G/DL (ref 3.4–5)
ALP SERPL-CCNC: 115 U/L (ref 40–150)
ALT SERPL W P-5'-P-CCNC: 19 U/L (ref 0–50)
ANION GAP SERPL CALCULATED.3IONS-SCNC: 6 MMOL/L (ref 3–14)
AST SERPL W P-5'-P-CCNC: 18 U/L (ref 0–45)
BILIRUB SERPL-MCNC: 0.3 MG/DL (ref 0.2–1.3)
BUN SERPL-MCNC: 13 MG/DL (ref 7–30)
CALCIUM SERPL-MCNC: 9 MG/DL (ref 8.5–10.1)
CHLORIDE SERPL-SCNC: 107 MMOL/L (ref 94–109)
CO2 SERPL-SCNC: 26 MMOL/L (ref 20–32)
CREAT SERPL-MCNC: 0.63 MG/DL (ref 0.52–1.04)
CREAT UR-MCNC: 64 MG/DL
ERYTHROCYTE [DISTWIDTH] IN BLOOD BY AUTOMATED COUNT: 12.7 % (ref 10–15)
GFR SERPL CREATININE-BSD FRML MDRD: >90 ML/MIN/{1.73_M2}
GLUCOSE SERPL-MCNC: 74 MG/DL (ref 70–99)
HCT VFR BLD AUTO: 36.3 % (ref 35–47)
HGB BLD-MCNC: 11.9 G/DL (ref 11.7–15.7)
MCH RBC QN AUTO: 30.1 PG (ref 26.5–33)
MCHC RBC AUTO-ENTMCNC: 32.8 G/DL (ref 31.5–36.5)
MCV RBC AUTO: 92 FL (ref 78–100)
PLATELET # BLD AUTO: 274 10E9/L (ref 150–450)
POTASSIUM SERPL-SCNC: 3.9 MMOL/L (ref 3.4–5.3)
PROT SERPL-MCNC: 6.6 G/DL (ref 6.8–8.8)
PROT UR-MCNC: 0.1 G/L
PROT/CREAT 24H UR: 0.16 G/G CR (ref 0–0.2)
RBC # BLD AUTO: 3.96 10E12/L (ref 3.8–5.2)
SODIUM SERPL-SCNC: 139 MMOL/L (ref 133–144)
WBC # BLD AUTO: 8 10E9/L (ref 4–11)

## 2020-06-15 PROCEDURE — 99207 ZZC PRENATAL VISIT: CPT | Performed by: OBSTETRICS & GYNECOLOGY

## 2020-06-15 PROCEDURE — 84156 ASSAY OF PROTEIN URINE: CPT | Performed by: OBSTETRICS & GYNECOLOGY

## 2020-06-15 PROCEDURE — 87653 STREP B DNA AMP PROBE: CPT | Performed by: OBSTETRICS & GYNECOLOGY

## 2020-06-15 PROCEDURE — 80053 COMPREHEN METABOLIC PANEL: CPT | Performed by: OBSTETRICS & GYNECOLOGY

## 2020-06-15 PROCEDURE — 85027 COMPLETE CBC AUTOMATED: CPT | Performed by: OBSTETRICS & GYNECOLOGY

## 2020-06-15 PROCEDURE — 36415 COLL VENOUS BLD VENIPUNCTURE: CPT | Performed by: OBSTETRICS & GYNECOLOGY

## 2020-06-15 NOTE — PROGRESS NOTES
29 year old  at 36w3d weeks presents to the clinic for a routine prenatal visit.  No concerns.  No vaginal bleeding, leakage of fluid, or contractions  Fundal height=37cm  ITBg=249's  CX=Cl/Th/-3  Vertex by BSUS  ZC=365/93.  Repeat 134/91.  Slight headache yesterday which went away on it's on.  She is having a hard time sleeping.  No vision changes.  No N/V.  No epigastric pain.  Some RUQ pain but that has been there.  Will check PIH labs today.    GBS done today  Labor precautions discussed  I recommended patient follow up later this week for a blood pressure check.    Annette Mckeon, DO

## 2020-06-16 LAB
GP B STREP DNA SPEC QL NAA+PROBE: NEGATIVE
SPECIMEN SOURCE: NORMAL

## 2020-06-17 ENCOUNTER — ALLIED HEALTH/NURSE VISIT (OUTPATIENT)
Dept: NURSING | Facility: CLINIC | Age: 30
End: 2020-06-17
Payer: COMMERCIAL

## 2020-06-17 VITALS — SYSTOLIC BLOOD PRESSURE: 142 MMHG | HEART RATE: 112 BPM | DIASTOLIC BLOOD PRESSURE: 98 MMHG

## 2020-06-17 DIAGNOSIS — Z34.00 SUPERVISION OF NORMAL FIRST PREGNANCY, ANTEPARTUM: Primary | ICD-10-CM

## 2020-06-17 NOTE — NURSING NOTE
Patient came in to clinic for a blood pressure check per . blood pressure elevated 141/99 and 142/98. Spoke with  who is in clinic today and he recommends she go to labor and delivery to be monitored. Patient agrees with this plan and will go directly to L & D. Labor and delivery notified that patient was on her way.  Cindy Gray CMA

## 2020-07-20 ENCOUNTER — MEDICAL CORRESPONDENCE (OUTPATIENT)
Dept: HEALTH INFORMATION MANAGEMENT | Facility: CLINIC | Age: 30
End: 2020-07-20

## 2020-08-12 ENCOUNTER — PRENATAL OFFICE VISIT (OUTPATIENT)
Dept: OBGYN | Facility: OTHER | Age: 30
End: 2020-08-12
Payer: COMMERCIAL

## 2020-08-12 VITALS
TEMPERATURE: 97.9 F | BODY MASS INDEX: 30.1 KG/M2 | DIASTOLIC BLOOD PRESSURE: 84 MMHG | HEART RATE: 84 BPM | WEIGHT: 178.5 LBS | SYSTOLIC BLOOD PRESSURE: 132 MMHG

## 2020-08-12 DIAGNOSIS — Z30.09 GENERAL COUNSELING FOR PRESCRIPTION OF ORAL CONTRACEPTIVES: ICD-10-CM

## 2020-08-12 PROBLEM — Z34.00 SUPERVISION OF NORMAL FIRST PREGNANCY, ANTEPARTUM: Status: RESOLVED | Noted: 2020-05-19 | Resolved: 2020-08-12

## 2020-08-12 PROCEDURE — 99207 ZZC POST PARTUM EXAM: CPT | Performed by: ADVANCED PRACTICE MIDWIFE

## 2020-08-12 RX ORDER — ACETAMINOPHEN AND CODEINE PHOSPHATE 120; 12 MG/5ML; MG/5ML
0.35 SOLUTION ORAL DAILY
Qty: 84 TABLET | Refills: 3 | Status: SHIPPED | OUTPATIENT
Start: 2020-08-12 | End: 2022-03-11

## 2020-08-12 ASSESSMENT — ANXIETY QUESTIONNAIRES
GAD7 TOTAL SCORE: 0
7. FEELING AFRAID AS IF SOMETHING AWFUL MIGHT HAPPEN: NOT AT ALL
2. NOT BEING ABLE TO STOP OR CONTROL WORRYING: NOT AT ALL
1. FEELING NERVOUS, ANXIOUS, OR ON EDGE: NOT AT ALL
5. BEING SO RESTLESS THAT IT IS HARD TO SIT STILL: NOT AT ALL
6. BECOMING EASILY ANNOYED OR IRRITABLE: NOT AT ALL
3. WORRYING TOO MUCH ABOUT DIFFERENT THINGS: NOT AT ALL
IF YOU CHECKED OFF ANY PROBLEMS ON THIS QUESTIONNAIRE, HOW DIFFICULT HAVE THESE PROBLEMS MADE IT FOR YOU TO DO YOUR WORK, TAKE CARE OF THINGS AT HOME, OR GET ALONG WITH OTHER PEOPLE: NOT DIFFICULT AT ALL

## 2020-08-12 ASSESSMENT — PATIENT HEALTH QUESTIONNAIRE - PHQ9
SUM OF ALL RESPONSES TO PHQ QUESTIONS 1-9: 0
5. POOR APPETITE OR OVEREATING: NOT AT ALL

## 2020-08-12 NOTE — PROGRESS NOTES
SUBJECTIVE: Binta Peterson  is here for a 6-week postpartum checkup.    Patient Active Problem List   Diagnosis     Vaginal cyst     Motion sickness     Past Medical History:   Diagnosis Date     NO ACTIVE PROBLEMS      Current Outpatient Medications   Medication Sig Dispense Refill     Ascorbic Acid (VITAMIN C) 500 MG PACK        FIBER ADULT GUMMIES PO        Omega-3 Fatty Acids (FISH OIL) 500 MG CAPS Take 1 capsule by mouth daily       Prenatal Vit-Fe Fumarate-FA (PRENATAL VITAMIN PO)        ROS:  Delivery date was 2020. She had a  of a viable boy, weight 9 pounds 7 oz., with Preeclampsia complications.   She is  breast feeding without problems  and no signs of mastitis. Bleeding  has stopped and there are no signs or symptoms of endometritis.  Patient  is screened for postpartum depression and is  Negative.   Denies stress incontinence.  No constipation, pain or bleeding with bowel movements.       EXAM:  /84 (BP Location: Left arm, Patient Position: Sitting, Cuff Size: Adult Large)   Pulse 84   Temp 97.9  F (36.6  C) (Oral)   Wt 81 kg (178 lb 8 oz)   LMP 2019 (Exact Date)   Breastfeeding Yes   BMI 30.10 kg/m    Eyes:  sclera clear  Neck:  Euthyroid without masses or nodules.  Heart:  RRR without murmur.  Lungs: Clear to auscultation.  Abd: Without masses or tenderness, no organomegaly, diastisis 1 fingerbreadth.  Uterus not palpable  M/S:  no gross deformities  Neuro:  normal strength and sensation  Rectum:  Deferred  Psych:  Normal affect, speech.        ASSESSMENT:   Normal postpartum exam.    PLAN:  (Z39.2) 6 weeks postpartum follow-up  (primary encounter diagnosis)  Comment:   Plan:     Encouraged  50 Kegals a day 25 fast and 25 slow release.   Signs, symptoms and preventative measures for mastitis were discussed and the patient will call with aches, fever of 101 or higher and flu like symptoms.  Signs and symptoms of post partum depression were discussed and she will contact  this office or other health care provider as appropriate.  Contraception is POP

## 2020-08-13 ASSESSMENT — ANXIETY QUESTIONNAIRES: GAD7 TOTAL SCORE: 0

## 2020-08-20 ENCOUNTER — MEDICAL CORRESPONDENCE (OUTPATIENT)
Dept: HEALTH INFORMATION MANAGEMENT | Facility: CLINIC | Age: 30
End: 2020-08-20

## 2020-12-21 ENCOUNTER — MEDICAL CORRESPONDENCE (OUTPATIENT)
Dept: HEALTH INFORMATION MANAGEMENT | Facility: CLINIC | Age: 30
End: 2020-12-21

## 2021-01-03 ENCOUNTER — HEALTH MAINTENANCE LETTER (OUTPATIENT)
Age: 31
End: 2021-01-03

## 2021-03-15 ENCOUNTER — MYC MEDICAL ADVICE (OUTPATIENT)
Dept: OBGYN | Facility: CLINIC | Age: 31
End: 2021-03-15

## 2021-03-15 NOTE — TELEPHONE ENCOUNTER
I called and spoke to patient regarding her vaccine questions.  All questions answered and patient verbalizes understanding.    Annette Mckeon, DO

## 2021-08-30 ENCOUNTER — MYC MEDICAL ADVICE (OUTPATIENT)
Dept: OBGYN | Facility: CLINIC | Age: 31
End: 2021-08-30

## 2021-08-30 DIAGNOSIS — Z32.01 PREGNANCY EXAMINATION OR TEST, POSITIVE RESULT: Primary | ICD-10-CM

## 2021-08-30 NOTE — TELEPHONE ENCOUNTER
LMP: 7/29. 4w4d.    Pt states she has only had 2 periods since her first child was born and have been irregular at 40 days and 30 days.  She also had irregular periods prior to her first child.    Routing to Dr. Mckeon to see if she would like to order serial HCG quants and eventually an US to determine how far along she is.    Carrie Unger RN

## 2021-09-01 ENCOUNTER — LAB (OUTPATIENT)
Dept: LAB | Facility: OTHER | Age: 31
End: 2021-09-01
Payer: COMMERCIAL

## 2021-09-01 DIAGNOSIS — Z32.01 PREGNANCY EXAMINATION OR TEST, POSITIVE RESULT: ICD-10-CM

## 2021-09-01 LAB — B-HCG SERPL-ACNC: 117 IU/L (ref 0–5)

## 2021-09-01 PROCEDURE — 36415 COLL VENOUS BLD VENIPUNCTURE: CPT

## 2021-09-01 PROCEDURE — 84702 CHORIONIC GONADOTROPIN TEST: CPT

## 2021-09-02 DIAGNOSIS — Z32.01 PREGNANCY EXAMINATION OR TEST, POSITIVE RESULT: Primary | ICD-10-CM

## 2021-09-15 ENCOUNTER — LAB (OUTPATIENT)
Dept: LAB | Facility: OTHER | Age: 31
End: 2021-09-15
Payer: COMMERCIAL

## 2021-09-15 DIAGNOSIS — Z32.01 PREGNANCY EXAMINATION OR TEST, POSITIVE RESULT: ICD-10-CM

## 2021-09-15 LAB — B-HCG SERPL-ACNC: 4356 IU/L (ref 0–5)

## 2021-09-15 PROCEDURE — 84702 CHORIONIC GONADOTROPIN TEST: CPT

## 2021-09-15 PROCEDURE — 36415 COLL VENOUS BLD VENIPUNCTURE: CPT

## 2021-09-20 DIAGNOSIS — Z32.01 PREGNANCY EXAMINATION OR TEST, POSITIVE RESULT: Primary | ICD-10-CM

## 2021-09-29 ENCOUNTER — ANCILLARY PROCEDURE (OUTPATIENT)
Dept: ULTRASOUND IMAGING | Facility: OTHER | Age: 31
End: 2021-09-29
Attending: OBSTETRICS & GYNECOLOGY
Payer: COMMERCIAL

## 2021-09-29 DIAGNOSIS — Z32.01 PREGNANCY EXAMINATION OR TEST, POSITIVE RESULT: ICD-10-CM

## 2021-09-29 PROCEDURE — 76801 OB US < 14 WKS SINGLE FETUS: CPT | Performed by: RADIOLOGY

## 2021-09-29 PROCEDURE — 76817 TRANSVAGINAL US OBSTETRIC: CPT | Performed by: RADIOLOGY

## 2021-09-30 DIAGNOSIS — O20.0 THREATENED MISCARRIAGE: Primary | ICD-10-CM

## 2021-10-01 ENCOUNTER — LAB (OUTPATIENT)
Dept: LAB | Facility: CLINIC | Age: 31
End: 2021-10-01
Payer: COMMERCIAL

## 2021-10-01 DIAGNOSIS — O20.0 THREATENED MISCARRIAGE: ICD-10-CM

## 2021-10-01 LAB — B-HCG SERPL-ACNC: ABNORMAL IU/L (ref 0–5)

## 2021-10-01 PROCEDURE — 84702 CHORIONIC GONADOTROPIN TEST: CPT

## 2021-10-01 PROCEDURE — 36415 COLL VENOUS BLD VENIPUNCTURE: CPT

## 2021-10-05 ENCOUNTER — MYC MEDICAL ADVICE (OUTPATIENT)
Dept: OBGYN | Facility: CLINIC | Age: 31
End: 2021-10-05

## 2021-10-05 NOTE — TELEPHONE ENCOUNTER
Pt in early pregnancy, asking about next steps after HCG results from 10/1 which appeared to be rising expectantly. US on 9/29: No fetal pole, heartbeat, or yolk sac is  identified.    Pt denies vaginal bleeding or cramping.    RN routing to provider if repeat HCG or US is needed, or if pt should schedule an appointment in clinic.    Nubia Walters RN on 10/5/2021 at 2:09 PM

## 2021-10-06 DIAGNOSIS — Z32.01 PREGNANCY EXAMINATION OR TEST, POSITIVE RESULT: Primary | ICD-10-CM

## 2021-10-07 NOTE — TELEPHONE ENCOUNTER
Please let patient know she can schedule her dating ultrasound anytime now.  The order is placed.  I will let her know the results when I have them.  Thanks.     Annette Mckeon, DO

## 2021-10-08 DIAGNOSIS — N93.9 VAGINAL SPOTTING: Primary | ICD-10-CM

## 2021-10-10 ENCOUNTER — HEALTH MAINTENANCE LETTER (OUTPATIENT)
Age: 31
End: 2021-10-10

## 2021-10-11 ENCOUNTER — LAB (OUTPATIENT)
Dept: LAB | Facility: CLINIC | Age: 31
End: 2021-10-11
Payer: COMMERCIAL

## 2021-10-11 DIAGNOSIS — N93.9 VAGINAL SPOTTING: ICD-10-CM

## 2021-10-11 LAB — B-HCG SERPL-ACNC: 748 IU/L (ref 0–5)

## 2021-10-11 PROCEDURE — 36415 COLL VENOUS BLD VENIPUNCTURE: CPT

## 2021-10-11 PROCEDURE — 84702 CHORIONIC GONADOTROPIN TEST: CPT

## 2021-10-22 ENCOUNTER — DOCUMENTATION ONLY (OUTPATIENT)
Dept: LAB | Facility: CLINIC | Age: 31
End: 2021-10-22

## 2021-10-22 DIAGNOSIS — O03.9 MISCARRIAGE: Primary | ICD-10-CM

## 2021-10-25 ENCOUNTER — LAB (OUTPATIENT)
Dept: LAB | Facility: CLINIC | Age: 31
End: 2021-10-25
Payer: COMMERCIAL

## 2021-10-25 DIAGNOSIS — O03.9 MISCARRIAGE: ICD-10-CM

## 2021-10-25 LAB — B-HCG SERPL-ACNC: 6 IU/L (ref 0–5)

## 2021-10-25 PROCEDURE — 36415 COLL VENOUS BLD VENIPUNCTURE: CPT

## 2021-10-25 PROCEDURE — 84702 CHORIONIC GONADOTROPIN TEST: CPT

## 2021-11-03 ENCOUNTER — LAB (OUTPATIENT)
Dept: LAB | Facility: OTHER | Age: 31
End: 2021-11-03
Payer: COMMERCIAL

## 2021-11-03 DIAGNOSIS — O03.9 MISCARRIAGE: ICD-10-CM

## 2021-11-03 LAB — B-HCG SERPL-ACNC: 2 IU/L (ref 0–5)

## 2021-11-03 PROCEDURE — 36415 COLL VENOUS BLD VENIPUNCTURE: CPT

## 2021-11-03 PROCEDURE — 84702 CHORIONIC GONADOTROPIN TEST: CPT

## 2021-11-22 ENCOUNTER — OFFICE VISIT (OUTPATIENT)
Dept: OBGYN | Facility: OTHER | Age: 31
End: 2021-11-22
Payer: COMMERCIAL

## 2021-11-22 VITALS
SYSTOLIC BLOOD PRESSURE: 118 MMHG | HEIGHT: 65 IN | DIASTOLIC BLOOD PRESSURE: 76 MMHG | WEIGHT: 186 LBS | BODY MASS INDEX: 30.99 KG/M2

## 2021-11-22 DIAGNOSIS — Z00.00 ANNUAL PHYSICAL EXAM: Primary | ICD-10-CM

## 2021-11-22 PROBLEM — N89.8 VAGINAL CYST: Status: RESOLVED | Noted: 2019-06-06 | Resolved: 2021-11-22

## 2021-11-22 LAB — TSH SERPL DL<=0.005 MIU/L-ACNC: 2.47 MU/L (ref 0.4–4)

## 2021-11-22 PROCEDURE — 84443 ASSAY THYROID STIM HORMONE: CPT | Performed by: OBSTETRICS & GYNECOLOGY

## 2021-11-22 PROCEDURE — 87624 HPV HI-RISK TYP POOLED RSLT: CPT | Performed by: OBSTETRICS & GYNECOLOGY

## 2021-11-22 PROCEDURE — 36415 COLL VENOUS BLD VENIPUNCTURE: CPT | Performed by: OBSTETRICS & GYNECOLOGY

## 2021-11-22 PROCEDURE — 99395 PREV VISIT EST AGE 18-39: CPT | Performed by: OBSTETRICS & GYNECOLOGY

## 2021-11-22 PROCEDURE — G0145 SCR C/V CYTO,THINLAYER,RESCR: HCPCS | Performed by: OBSTETRICS & GYNECOLOGY

## 2021-11-22 ASSESSMENT — MIFFLIN-ST. JEOR: SCORE: 1548.22

## 2021-11-22 NOTE — PATIENT INSTRUCTIONS
PREVENTIVE HEALTH RECOMMENDATIONS:   Get a physical every year.  A pap test is important to have done starting at age 21 and then every three years as long as your pap is normal.  When you receive the results of your pap test it will include when you need to have your next pap test.    You should be tested each year for chlamydia and gonorrhea if you are aged 16-25 and if you have had a new sexual partner since you were last tested.   Vaccines: Get a flu shot each year.   Eat at least 8-10 servings of fruits and vegetables daily.  Eat whole-grain bread and cereal, whole-wheat pasta and brown rice instead of white grains and white rice.   For bone health: Eat calcium-rich foods (dairy products) or take calcium pills (500 to 600 mg with vitamin D) twice a day with food.   Exercise for an average of 30 minutes a day, 5 days of the week. It can be as simple as taking a brisk walk.  This will help you control your weight and prevent many diseases.   Limit alcohol to one drink per day.   Don't smoke and limit your exposure to second hand smoke.  If you smoke consider making a plan to quit. Go to [employer] and clink on   [employer]  for help   Wear sunscreen with at least SPF15 to prevent skin damage and skin cancer.   Brush your teeth twice a day and floss once a day and see your dentist twice a year for an exam and cleaning.   Have a great year and I will look forward to seeing you next year.   Annette Mckeon DO    If you have labs or imaging done, the results will automatically release in [facility] without an interpretation.  Your health care professional will review those results and send an interpretation with recommendations as soon as possible, but this may be 1-3 business days.    If you have any questions regarding your visit, please contact your care team.     [employer] [facility]: [phone]  Women s Health CLINIC HOURS TELEPHONE NUMBER       DO Deepthi Delarosa   MARY ELLEN Delacruz-MARISABEL Butler-MARISABEL Gonzalez-MARISABEL Watts-  [place]-     Monday- [place]  8:00 a.m - 5:00 p.m    Tuesday- [place]  8:00 a.m - 5:00 p.m    Wednesday- OFF    Thursday- Surgery     Friday- [place]  8:00 a.m - 5:00 p.m. [facility]  [address]  [place] [address]  [phone] Fax  Imaging [phone]    [facility] Labor and Delivery  [address] [place] [address] [place] [address]  [phone]    [address] [place] [address]  [phone] Fax  Imaging [phone]     Urgent Care locations:    [facility] Monday-Friday  10 am - 8 pm  Saturday and Sunday   9 am - 5 pm  Monday-Friday   10 am - 8 pm  Saturday and Sunday   9 am - 5 pm   [phone]     If you need a medication refill, please contact your pharmacy. Please allow 3 business days for your refill to be completed.    As always, thank you for trusting us with your healthcare needs!

## 2021-11-22 NOTE — PROGRESS NOTES
"Chief Complaint   Patient presents with     Physical       Subjective  Binta Peterson is a 31 year old female who presents for her annual exam.  Patient had a recent miscarriage in Oct.  She did not need a SD&C.  Her last menstrual period was 2 weeks.  She is actively trying to get pregnant.  She is breastfeeding 2x a day for her 17 month old.  No problems urinating.  Normal bowel movements.  Patient is sexually active.  No dyspareunia.  No vaginal spotting after.  1 .  Patient already received her flu vaccine.  We discussed ovulation kits and signs of ovulation in detail.      Most recent pap: 2018  History of abnormal Pap smear:  No  History of STI's:  No  History of PID:   No    Family history of uterine cancer:  No  Family history of ovarian cancer: No  Family history of colon cancer:   No  Family history of breast cancer:  No    ROS  ROS: 10 point ROS neg other than the symptoms noted above in the HPI.    Past Medical History:   Diagnosis Date     NO ACTIVE PROBLEMS      Past Surgical History:   Procedure Laterality Date     NO HISTORY OF SURGERY       History reviewed. No pertinent family history.  Social History     Tobacco Use     Smoking status: Never Smoker     Smokeless tobacco: Never Used   Substance Use Topics     Alcohol use: Not Currently       Tobacco abuse:  No  Do you get at least three servings of calcium containing foods daily (dairy, green leafy vegetables, etc.)? yes   Outside of work or daily activities, how many days per week do you exercise for 30 minutes or longer? 3-4x per week  The patient does not drink >3 drinks per day nor >7 drinks per week.   Have you had an eye exam in the past two years? yes   Do you see a dentist twice per year? yes   Today's PHQ-2 Score:   Abuse: Current or Past(Physical, Sexual or Emotional)- No   Do you feel safe in your environment - Yes  Objective  Vitals: /76   Ht 1.641 m (5' 4.6\")   Wt 84.4 kg (186 lb)   LMP 2021   Breastfeeding No   " BMI 31.34 kg/m    BMI= Body mass index is 31.34 kg/m .    General appearance=well developed, well-nourished female  Gait=normal  Psych=mood is stable, alert and oriented x3  HEENT=mucous membranes moist  Skin=no rashes or lesions seen,normal turgor   Breast:  Benign exam, no masses palpated.  No skin changes, no axillary lymphadenopathy, no nipple discharge.  Axilla feel completely normal, no lymph node enlargement and non-tender.  Neck=overall appearance is normal  Heart=RRR, no murmurs, no swelling noted  Thyroid=normal, no masses, no TTP, no enlargement  Lungs=non-labored breathing, no use of accessory muscles, clear to ausculation bilaterally  Abd=soft, Nontender/nondistended, +bowel sounds x4, no masses, no signs of hernias, no evidence of hepatosplenomegaly  PELVIC:    External genitalia: normal without lesions or masses  Urethral meatus: no lesions or prolapse noted, normal size  Urethra: no masses, non tender  Bladder: non tender, no fullness  Vagina: normal mucosa and rugae, no discharge.  Cervix: normal without lesion, no cervical motion tenderness, healthy, multiparous, pap smear obtained  Uterus: small, mobile, nontender.  Adnexa: non tender, without masses  Rectal: deferred  Ext=no clubbing or cyanosis, no swelling      Last lipid profile:  2019  Regular self breast exam:  Yes  Most recent mammogram:   N/A  History of abnormal mammogram:  N/A  Fit testing:  N/A  DEXA:  N/A        Assessment/Plan  1.)  Annual/well woman exam=pap smear  2.)  Infertility=TSH      The following topics were discussed or recommended   Discussed seat belt, helmet and sunscreen use  Vision screening   Calcium/Vitamin D supplement=Recommended 1000 mg of calcium daily and 800 IU of vitamin D.    30 minutes were spent on the date of the encounter doing chart review, history and exam, documentation, and further activities as noted above.        Annette Mckeon DO

## 2021-11-24 LAB
BKR LAB AP GYN ADEQUACY: NORMAL
BKR LAB AP GYN INTERPRETATION: NORMAL
BKR LAB AP HPV REFLEX: NORMAL
BKR LAB AP LMP: NORMAL
BKR LAB AP PREVIOUS ABNORMAL: NORMAL
PATH REPORT.COMMENTS IMP SPEC: NORMAL
PATH REPORT.COMMENTS IMP SPEC: NORMAL
PATH REPORT.RELEVANT HX SPEC: NORMAL

## 2021-11-30 LAB
HUMAN PAPILLOMA VIRUS 16 DNA: NEGATIVE
HUMAN PAPILLOMA VIRUS 18 DNA: NEGATIVE
HUMAN PAPILLOMA VIRUS FINAL DIAGNOSIS: NORMAL
HUMAN PAPILLOMA VIRUS OTHER HR: NEGATIVE

## 2021-12-16 ENCOUNTER — MYC MEDICAL ADVICE (OUTPATIENT)
Dept: OBGYN | Facility: OTHER | Age: 31
End: 2021-12-16
Payer: COMMERCIAL

## 2021-12-16 DIAGNOSIS — Z87.59 HISTORY OF MISCARRIAGE: Primary | ICD-10-CM

## 2021-12-16 NOTE — TELEPHONE ENCOUNTER
LMP: 11/9/21, 5w2d.      Miscarriage this fall.  She states she had blood work prior to her miscarriage due to irregular periods.  She is wondering if she should be getting the blood work again.  She has had one period since her miscarriage.  She will call and schedule a New Prenatal appt with Dr. Mckeon when she is around 10-12 weeks along based on her LMP.    Routing to Dr. Mckeon to see if she would like to order HCG quants for pt due to history of irregular periods.    Carrie Unger RN

## 2021-12-17 ENCOUNTER — LAB (OUTPATIENT)
Dept: LAB | Facility: OTHER | Age: 31
End: 2021-12-17
Payer: COMMERCIAL

## 2021-12-17 DIAGNOSIS — Z87.59 HISTORY OF MISCARRIAGE: ICD-10-CM

## 2021-12-17 LAB — B-HCG SERPL-ACNC: 1125 IU/L (ref 0–5)

## 2021-12-17 PROCEDURE — 36415 COLL VENOUS BLD VENIPUNCTURE: CPT

## 2021-12-17 PROCEDURE — 84702 CHORIONIC GONADOTROPIN TEST: CPT

## 2021-12-17 NOTE — TELEPHONE ENCOUNTER
Please let patient know I placed the lab orders.  She should do serial quants due to her history of a miscarriage.  One today and one on Monday would be fine.  Thanks.    Annette Mckeon, DO

## 2021-12-20 ENCOUNTER — LAB (OUTPATIENT)
Dept: LAB | Facility: OTHER | Age: 31
End: 2021-12-20
Payer: COMMERCIAL

## 2021-12-20 DIAGNOSIS — Z87.59 HISTORY OF MISCARRIAGE: ICD-10-CM

## 2021-12-20 LAB — B-HCG SERPL-ACNC: 3765 IU/L (ref 0–5)

## 2021-12-20 PROCEDURE — 84702 CHORIONIC GONADOTROPIN TEST: CPT

## 2021-12-20 PROCEDURE — 36415 COLL VENOUS BLD VENIPUNCTURE: CPT

## 2021-12-21 DIAGNOSIS — Z32.01 PREGNANCY EXAMINATION OR TEST, POSITIVE RESULT: Primary | ICD-10-CM

## 2022-01-12 ENCOUNTER — ANCILLARY PROCEDURE (OUTPATIENT)
Dept: ULTRASOUND IMAGING | Facility: OTHER | Age: 32
End: 2022-01-12
Attending: OBSTETRICS & GYNECOLOGY
Payer: COMMERCIAL

## 2022-01-12 DIAGNOSIS — Z32.01 PREGNANCY EXAMINATION OR TEST, POSITIVE RESULT: ICD-10-CM

## 2022-01-12 PROCEDURE — 76801 OB US < 14 WKS SINGLE FETUS: CPT | Performed by: RADIOLOGY

## 2022-02-14 ENCOUNTER — PRENATAL OFFICE VISIT (OUTPATIENT)
Dept: OBGYN | Facility: OTHER | Age: 32
End: 2022-02-14
Payer: COMMERCIAL

## 2022-02-14 VITALS
WEIGHT: 191 LBS | HEIGHT: 65 IN | SYSTOLIC BLOOD PRESSURE: 120 MMHG | DIASTOLIC BLOOD PRESSURE: 76 MMHG | BODY MASS INDEX: 31.82 KG/M2

## 2022-02-14 DIAGNOSIS — O09.299 H/O PRE-ECLAMPSIA IN PRIOR PREGNANCY, CURRENTLY PREGNANT: ICD-10-CM

## 2022-02-14 DIAGNOSIS — Z34.92 NORMAL PREGNANCY IN SECOND TRIMESTER: Primary | ICD-10-CM

## 2022-02-14 DIAGNOSIS — O22.02 OBSTETRIC VARICOSE VEINS IN SECOND TRIMESTER: ICD-10-CM

## 2022-02-14 PROBLEM — Z23 NEED FOR TDAP VACCINATION: Status: ACTIVE | Noted: 2019-11-25

## 2022-02-14 LAB
ABO/RH(D): NORMAL
ALBUMIN SERPL-MCNC: 3.2 G/DL (ref 3.4–5)
ALBUMIN UR-MCNC: NEGATIVE MG/DL
ALP SERPL-CCNC: 45 U/L (ref 40–150)
ALT SERPL W P-5'-P-CCNC: 19 U/L (ref 0–50)
ANION GAP SERPL CALCULATED.3IONS-SCNC: 4 MMOL/L (ref 3–14)
ANTIBODY SCREEN: NEGATIVE
APPEARANCE UR: CLEAR
AST SERPL W P-5'-P-CCNC: 12 U/L (ref 0–45)
BASOPHILS # BLD AUTO: 0 10E3/UL (ref 0–0.2)
BASOPHILS NFR BLD AUTO: 0 %
BILIRUB SERPL-MCNC: 0.3 MG/DL (ref 0.2–1.3)
BILIRUB UR QL STRIP: NEGATIVE
BUN SERPL-MCNC: 11 MG/DL (ref 7–30)
CALCIUM SERPL-MCNC: 8.7 MG/DL (ref 8.5–10.1)
CHLORIDE BLD-SCNC: 104 MMOL/L (ref 94–109)
CO2 SERPL-SCNC: 28 MMOL/L (ref 20–32)
COLOR UR AUTO: YELLOW
CREAT SERPL-MCNC: 0.62 MG/DL (ref 0.52–1.04)
CREAT UR-MCNC: 40 MG/DL
EOSINOPHIL # BLD AUTO: 0.1 10E3/UL (ref 0–0.7)
EOSINOPHIL NFR BLD AUTO: 1 %
ERYTHROCYTE [DISTWIDTH] IN BLOOD BY AUTOMATED COUNT: 12.8 % (ref 10–15)
GFR SERPL CREATININE-BSD FRML MDRD: >90 ML/MIN/1.73M2
GLUCOSE BLD-MCNC: 92 MG/DL (ref 70–99)
GLUCOSE UR STRIP-MCNC: NEGATIVE MG/DL
HCT VFR BLD AUTO: 38.2 % (ref 35–47)
HGB BLD-MCNC: 12.9 G/DL (ref 11.7–15.7)
HGB UR QL STRIP: NEGATIVE
KETONES UR STRIP-MCNC: NEGATIVE MG/DL
LEUKOCYTE ESTERASE UR QL STRIP: NEGATIVE
LYMPHOCYTES # BLD AUTO: 2.2 10E3/UL (ref 0.8–5.3)
LYMPHOCYTES NFR BLD AUTO: 23 %
MCH RBC QN AUTO: 30.4 PG (ref 26.5–33)
MCHC RBC AUTO-ENTMCNC: 33.8 G/DL (ref 31.5–36.5)
MCV RBC AUTO: 90 FL (ref 78–100)
MONOCYTES # BLD AUTO: 0.4 10E3/UL (ref 0–1.3)
MONOCYTES NFR BLD AUTO: 5 %
NEUTROPHILS # BLD AUTO: 6.9 10E3/UL (ref 1.6–8.3)
NEUTROPHILS NFR BLD AUTO: 71 %
NITRATE UR QL: NEGATIVE
PH UR STRIP: 6 [PH] (ref 5–7)
PLATELET # BLD AUTO: 347 10E3/UL (ref 150–450)
POTASSIUM BLD-SCNC: 4.2 MMOL/L (ref 3.4–5.3)
PROT SERPL-MCNC: 7.4 G/DL (ref 6.8–8.8)
PROT UR-MCNC: 0.07 G/L
PROT/CREAT 24H UR: 0.18 G/G CR (ref 0–0.2)
RBC # BLD AUTO: 4.25 10E6/UL (ref 3.8–5.2)
RBC #/AREA URNS AUTO: NORMAL /HPF
SODIUM SERPL-SCNC: 136 MMOL/L (ref 133–144)
SP GR UR STRIP: 1.01 (ref 1–1.03)
SPECIMEN EXPIRATION DATE: NORMAL
UROBILINOGEN UR STRIP-ACNC: 0.2 E.U./DL
WBC # BLD AUTO: 9.7 10E3/UL (ref 4–11)
WBC #/AREA URNS AUTO: NORMAL /HPF

## 2022-02-14 PROCEDURE — 87389 HIV-1 AG W/HIV-1&-2 AB AG IA: CPT | Performed by: OBSTETRICS & GYNECOLOGY

## 2022-02-14 PROCEDURE — 85025 COMPLETE CBC W/AUTO DIFF WBC: CPT | Performed by: OBSTETRICS & GYNECOLOGY

## 2022-02-14 PROCEDURE — 87491 CHLMYD TRACH DNA AMP PROBE: CPT | Performed by: OBSTETRICS & GYNECOLOGY

## 2022-02-14 PROCEDURE — 86850 RBC ANTIBODY SCREEN: CPT | Performed by: OBSTETRICS & GYNECOLOGY

## 2022-02-14 PROCEDURE — 99207 PR FIRST OB VISIT: CPT | Performed by: OBSTETRICS & GYNECOLOGY

## 2022-02-14 PROCEDURE — 84156 ASSAY OF PROTEIN URINE: CPT | Performed by: OBSTETRICS & GYNECOLOGY

## 2022-02-14 PROCEDURE — 81001 URINALYSIS AUTO W/SCOPE: CPT | Performed by: OBSTETRICS & GYNECOLOGY

## 2022-02-14 PROCEDURE — 86901 BLOOD TYPING SEROLOGIC RH(D): CPT | Performed by: OBSTETRICS & GYNECOLOGY

## 2022-02-14 PROCEDURE — 80053 COMPREHEN METABOLIC PANEL: CPT | Performed by: OBSTETRICS & GYNECOLOGY

## 2022-02-14 PROCEDURE — 36415 COLL VENOUS BLD VENIPUNCTURE: CPT | Performed by: OBSTETRICS & GYNECOLOGY

## 2022-02-14 PROCEDURE — 87340 HEPATITIS B SURFACE AG IA: CPT | Performed by: OBSTETRICS & GYNECOLOGY

## 2022-02-14 PROCEDURE — 86900 BLOOD TYPING SEROLOGIC ABO: CPT | Performed by: OBSTETRICS & GYNECOLOGY

## 2022-02-14 PROCEDURE — 87591 N.GONORRHOEAE DNA AMP PROB: CPT | Performed by: OBSTETRICS & GYNECOLOGY

## 2022-02-14 PROCEDURE — 86780 TREPONEMA PALLIDUM: CPT | Performed by: OBSTETRICS & GYNECOLOGY

## 2022-02-14 PROCEDURE — 86762 RUBELLA ANTIBODY: CPT | Performed by: OBSTETRICS & GYNECOLOGY

## 2022-02-14 PROCEDURE — 86803 HEPATITIS C AB TEST: CPT | Performed by: OBSTETRICS & GYNECOLOGY

## 2022-02-14 ASSESSMENT — MIFFLIN-ST. JEOR: SCORE: 1570.9

## 2022-02-14 NOTE — PATIENT INSTRUCTIONS
Please call if you any questions.    [address]   [address]  [phone]        Annette Mckeon,

## 2022-02-15 LAB
C TRACH DNA SPEC QL NAA+PROBE: NEGATIVE
HBV SURFACE AG SERPL QL IA: NONREACTIVE
HCV AB SERPL QL IA: NONREACTIVE
HIV 1+2 AB+HIV1 P24 AG SERPL QL IA: NONREACTIVE
N GONORRHOEA DNA SPEC QL NAA+PROBE: NEGATIVE
RUBV IGG SERPL QL IA: 1.81 INDEX
RUBV IGG SERPL QL IA: POSITIVE
T PALLIDUM AB SER QL: NONREACTIVE

## 2022-03-07 ENCOUNTER — MYC MEDICAL ADVICE (OUTPATIENT)
Dept: OBGYN | Facility: CLINIC | Age: 32
End: 2022-03-07
Payer: COMMERCIAL

## 2022-03-07 ENCOUNTER — TELEPHONE (OUTPATIENT)
Dept: OBGYN | Facility: CLINIC | Age: 32
End: 2022-03-07
Payer: COMMERCIAL

## 2022-03-07 DIAGNOSIS — O22.02 OBSTETRIC VARICOSE VEINS IN SECOND TRIMESTER: Primary | ICD-10-CM

## 2022-03-07 NOTE — TELEPHONE ENCOUNTER
"In clinic RN to please fax new orders for compression sleeve/stocking in \"other orders\" to [facility] at fax number provided below.    RODRÍGUEZ NovakN RN    "

## 2022-03-07 NOTE — TELEPHONE ENCOUNTER
RN attempted to call [facility] number back w/o no dial tone, twice to relay provider message.    Unable to reach patient via phone.  Left message to call clinic back at [phone] or to check her [employer] message.    Lisa Castillo, RODRÍGUEZN RN

## 2022-03-07 NOTE — TELEPHONE ENCOUNTER
" Debra calling [facility], needs to confirm RX for compression stockings. Current order states: \"maternity\".  Provider to please clarify which type of stockings:  knee high, panty hose, or thigh high.    Call back [phone]    Routing to provider for order clarification.    RODRÍGUEZ NovakN RN    "

## 2022-03-07 NOTE — TELEPHONE ENCOUNTER
"RN spoke with Debra at [facility]. Debra states the original RX is for \"thigh high and for maternity\".  Provider states maternity is to mean \"knee high\" compression stockings.  Per Debra, the pt is expecting to get panty hose style compression stockings.     A new order will need to be Faxed to [phone].    Routing back to provider to clarify that pt should have 1 pair thigh high and 1 pair knee high compression stockings, and no panty hose.  Once provider clarify's order, new order needs to be completed and faxed to number above. RN able to order if provider approves.    RODRÍGUEZ NovakN RN    "

## 2022-03-07 NOTE — TELEPHONE ENCOUNTER
I put a new prescription in for patient.  Please fax to where she wants it sent.  Thanks.    Annette Mckeon, DO

## 2022-03-11 ENCOUNTER — PRENATAL OFFICE VISIT (OUTPATIENT)
Dept: OBGYN | Facility: OTHER | Age: 32
End: 2022-03-11
Payer: COMMERCIAL

## 2022-03-11 VITALS — DIASTOLIC BLOOD PRESSURE: 79 MMHG | BODY MASS INDEX: 32.68 KG/M2 | WEIGHT: 194 LBS | SYSTOLIC BLOOD PRESSURE: 136 MMHG

## 2022-03-11 DIAGNOSIS — O09.299 H/O PRE-ECLAMPSIA IN PRIOR PREGNANCY, CURRENTLY PREGNANT: ICD-10-CM

## 2022-03-11 DIAGNOSIS — Z34.92 NORMAL PREGNANCY IN SECOND TRIMESTER: Primary | ICD-10-CM

## 2022-03-11 DIAGNOSIS — O22.02 OBSTETRIC VARICOSE VEINS IN SECOND TRIMESTER: ICD-10-CM

## 2022-03-11 PROCEDURE — 99207 PR PRENATAL VISIT: CPT | Performed by: OBSTETRICS & GYNECOLOGY

## 2022-03-11 RX ORDER — ASPIRIN 81 MG/1
81 TABLET ORAL DAILY
COMMUNITY
End: 2022-10-03

## 2022-03-11 NOTE — PATIENT INSTRUCTIONS
Please call if you any questions.    [address]   [address]  [phone]        Annette Mckeon,

## 2022-03-11 NOTE — PROGRESS NOTES
31 year old  at 17w3d weeks presents to the clinic for a routine prenatal visit.  Feeling well.  No vaginal bleeding, leakage of fluid, or contractions   Fundal height=s=d  CWHc=797  Discussed quad screen and she declines  Varicose veins=stable  History of Pre-E=ASA  Anatomy ultrasound=[facility] ultrasound on 3/25   RTC 4 weeks.    Annette Mckeon DO

## 2022-03-25 ENCOUNTER — PRENATAL OFFICE VISIT (OUTPATIENT)
Dept: OBGYN | Facility: OTHER | Age: 32
End: 2022-03-25
Payer: COMMERCIAL

## 2022-03-25 VITALS — DIASTOLIC BLOOD PRESSURE: 87 MMHG | SYSTOLIC BLOOD PRESSURE: 133 MMHG | BODY MASS INDEX: 32.68 KG/M2 | WEIGHT: 194 LBS

## 2022-03-25 DIAGNOSIS — O22.02 OBSTETRIC VARICOSE VEINS IN SECOND TRIMESTER: ICD-10-CM

## 2022-03-25 DIAGNOSIS — O09.299 H/O PRE-ECLAMPSIA IN PRIOR PREGNANCY, CURRENTLY PREGNANT: ICD-10-CM

## 2022-03-25 DIAGNOSIS — Z34.92 NORMAL PREGNANCY IN SECOND TRIMESTER: Primary | ICD-10-CM

## 2022-03-25 PROCEDURE — 99207 PR PRENATAL VISIT: CPT | Performed by: OBSTETRICS & GYNECOLOGY

## 2022-03-25 NOTE — PROGRESS NOTES
31 year old  at 19w3d weeks presents to the clinic for a routine prenatal visit.  Feeling well.  No vaginal bleeding, leakage of fluid, or contractions   Fundal height=s=d  WDTw=193  Discussed quad screen and she declines  Varicose veins=pt does have compression stockings  History of Pre-E=ASA  Level II US with MFM next Friday  RTC 4 weeks.    Annette Mckeon, DO

## 2022-03-25 NOTE — PATIENT INSTRUCTIONS
Please call if you any questions.    [address]   [address]  [phone]        Annette Mckeon,

## 2022-04-28 ENCOUNTER — PRENATAL OFFICE VISIT (OUTPATIENT)
Dept: OBGYN | Facility: OTHER | Age: 32
End: 2022-04-28
Payer: COMMERCIAL

## 2022-04-28 VITALS
BODY MASS INDEX: 33.61 KG/M2 | WEIGHT: 199.5 LBS | SYSTOLIC BLOOD PRESSURE: 139 MMHG | DIASTOLIC BLOOD PRESSURE: 83 MMHG | HEART RATE: 109 BPM

## 2022-04-28 DIAGNOSIS — Z34.92 NORMAL PREGNANCY IN SECOND TRIMESTER: Primary | ICD-10-CM

## 2022-04-28 PROCEDURE — 99207 PR PRENATAL VISIT: CPT | Performed by: ADVANCED PRACTICE MIDWIFE

## 2022-04-28 NOTE — PROGRESS NOTES
Feels well.  No complaints  Fetal movement: positive   Denies loss of fluid/vb/contractions  GCT next week  Tdap next visit; reviewed Need for Rhogam? No, to be done next visit   Discussed VV and wearing stockings.  She feels that they help a lot.   No HA/VD.  Discussed history of PIH and plan going forward.   Continues ASA  Has MFM ultrasound scheduled.     Return to clinic 4 weeks    Germaine Ann, IMAN, CNM

## 2022-05-11 ENCOUNTER — LAB (OUTPATIENT)
Dept: LAB | Facility: OTHER | Age: 32
End: 2022-05-11
Payer: COMMERCIAL

## 2022-05-11 DIAGNOSIS — Z34.92 NORMAL PREGNANCY IN SECOND TRIMESTER: ICD-10-CM

## 2022-05-11 LAB
GLUCOSE 1H P 50 G GLC PO SERPL-MCNC: 130 MG/DL (ref 70–129)
HGB BLD-MCNC: 11.1 G/DL (ref 11.7–15.7)

## 2022-05-11 PROCEDURE — 36415 COLL VENOUS BLD VENIPUNCTURE: CPT

## 2022-05-11 PROCEDURE — 82950 GLUCOSE TEST: CPT

## 2022-05-18 ENCOUNTER — LAB (OUTPATIENT)
Dept: LAB | Facility: OTHER | Age: 32
End: 2022-05-18
Payer: COMMERCIAL

## 2022-05-18 DIAGNOSIS — Z34.92 NORMAL PREGNANCY IN SECOND TRIMESTER: ICD-10-CM

## 2022-05-18 LAB
GESTATIONAL GTT 1 HR POST DOSE: 184 MG/DL (ref 60–179)
GESTATIONAL GTT 2 HR POST DOSE: 152 MG/DL (ref 60–154)
GESTATIONAL GTT 3 HR POST DOSE: 112 MG/DL (ref 60–139)
GLUCOSE P FAST SERPL-MCNC: 88 MG/DL (ref 60–94)

## 2022-05-18 PROCEDURE — 82951 GLUCOSE TOLERANCE TEST (GTT): CPT

## 2022-05-18 PROCEDURE — 82952 GTT-ADDED SAMPLES: CPT

## 2022-05-18 PROCEDURE — 36415 COLL VENOUS BLD VENIPUNCTURE: CPT

## 2022-05-25 NOTE — PATIENT INSTRUCTIONS
You may  a blood pressure cuff and check your blood pressures at home a couple times per week.  You may keep a diary of your value and bring them to your next appointment.  Goal is the top number less than 140 and the bottom number less than 90.                                                                  If you have any questions regarding your visit, Please contact your care team.     [employer]: [phone]  To Schedule an Appointment 24/7  Call: [phone] HOU[facility] TELEPHONE NUMBER     Breanne Mckeon MD  Assistant Medical Director    [place]- Certified Medical Assistant    Murali Butler-MARISABEL Watts-Surgery Scheduler  [place]-Surgery Scheduler     Monday-[place]  8:00 am-4:45 pm      Thursday-[place]  8:00 am-4:45 pm      Friday-[place]  7:30 am-4:00 pm    Typical Surgery Day: Tuesday [facility]  [address]  [place] [address] [phone] Appointment Line  [phone] Fax    Imaging Scheduling all locations  [phone]     [facility] Labor and Delivery  [address]  [place], [place] [address] [phone]    [address] [address] [phone] Appointment Line       **Surgeries** Our Surgery Schedulers will contact you to schedule. If you do not receive a call within 3 business days, please call [phone].    Urgent Care locations:  [facility] Monday-Friday  10 am - 8 pm    Saturday and Sunday   9 am - 5 pm     [phone]   If you need a medication refill, please contact your pharmacy. Please allow 3 business days for your refill to be completed.  As always, Thank you for trusting us with your healthcare needs!      see additional instructions from your care team below

## 2022-05-26 ENCOUNTER — PRENATAL OFFICE VISIT (OUTPATIENT)
Dept: OBGYN | Facility: OTHER | Age: 32
End: 2022-05-26
Payer: COMMERCIAL

## 2022-05-26 VITALS
WEIGHT: 200 LBS | BODY MASS INDEX: 33.7 KG/M2 | OXYGEN SATURATION: 100 % | HEART RATE: 136 BPM | DIASTOLIC BLOOD PRESSURE: 87 MMHG | SYSTOLIC BLOOD PRESSURE: 138 MMHG

## 2022-05-26 DIAGNOSIS — Z23 NEED FOR TDAP VACCINATION: ICD-10-CM

## 2022-05-26 DIAGNOSIS — O09.299 H/O PRE-ECLAMPSIA IN PRIOR PREGNANCY, CURRENTLY PREGNANT: Primary | ICD-10-CM

## 2022-05-26 PROCEDURE — 90715 TDAP VACCINE 7 YRS/> IM: CPT | Performed by: OBSTETRICS & GYNECOLOGY

## 2022-05-26 PROCEDURE — 90471 IMMUNIZATION ADMIN: CPT | Performed by: OBSTETRICS & GYNECOLOGY

## 2022-05-26 PROCEDURE — 99207 PR PRENATAL VISIT: CPT | Performed by: OBSTETRICS & GYNECOLOGY

## 2022-05-26 NOTE — PROGRESS NOTES
Presents for routine  appointment.     No complaints.    No abnormal discharge, no leaking fluid, no contractions, no vaginal bleeding    ROS:   and GI  negative.     Please see Prenatal Vitals and Notes Flowsheet for objective data.  Hemoglobin   Date Value Ref Range Status   2022 11.1 (L) 11.7 - 15.7 g/dL Final   06/15/2020 11.9 11.7 - 15.7 g/dL Final       A/P:  31 year old  at 28w2d       ICD-10-CM    1. H/O pre-eclampsia in prior pregnancy, currently pregnant  O09.299    2. Need for Tdap vaccination  Z23 TDAP VACCINE (Adacel, Boostrix)  [[id number]]       GCT Abnormal: 130.  Passed 3 hr GTT with one abnormal.  TDAP today.    Rhogam: not needed  Discussed kick counts  Low dose aspirin for preeclampsia risk reduction   MFM note reviewed - follow as needed.  Recommend she start monitoring her BPs at home.  Parameters discussed.  Reportable signs and symptoms discussed.   Follow up in 2 weeks.      Breanne Mckeon MD

## 2022-05-26 NOTE — PROGRESS NOTES
Prior to immunization administration, verified patients identity using patient s name and date of birth. Please see Immunization Activity for additional information.     Screening Questionnaire for Adult Immunization    Are you sick today?   No   Do you have allergies to medications, food, a vaccine component or latex?   No   Have you ever had a serious reaction after receiving a vaccination?   No   Do you have a long-term health problem with heart, lung, kidney, or metabolic disease (e.g., diabetes), asthma, a blood disorder, no spleen, complement component deficiency, a cochlear implant, or a spinal fluid leak?  Are you on long-term aspirin therapy?   No   Do you have cancer, leukemia, HIV/AIDS, or any other immune system problem?   No   Do you have a parent, brother, or sister with an immune system problem?   No   In the past 3 months, have you taken medications that affect  your immune system, such as prednisone, other steroids, or anticancer drugs; drugs for the treatment of rheumatoid arthritis, Crohn s disease, or psoriasis; or have you had radiation treatments?   No   Have you had a seizure, or a brain or other nervous system problem?   No   During the past year, have you received a transfusion of blood or blood    products, or been given immune (gamma) globulin or antiviral drug?   No   For women: Are you pregnant or is there a chance you could become       pregnant during the next month?   Yes   Have you received any vaccinations in the past 4 weeks?   No     Immunization questionnaire was positive for at least one answer.  Notified Mike.        Per orders of Dr. Mckeon, injection of Tdap given by Caterina Ventura MA. Patient instructed to remain in clinic for 15 minutes afterwards, and to report any adverse reaction to me immediately.       Screening performed by Caterina Ventura MA on 5/26/2022 at 2:07 PM.

## 2022-06-16 ENCOUNTER — PRENATAL OFFICE VISIT (OUTPATIENT)
Dept: OBGYN | Facility: OTHER | Age: 32
End: 2022-06-16
Payer: COMMERCIAL

## 2022-06-16 VITALS
HEART RATE: 129 BPM | SYSTOLIC BLOOD PRESSURE: 126 MMHG | DIASTOLIC BLOOD PRESSURE: 84 MMHG | BODY MASS INDEX: 34.07 KG/M2 | WEIGHT: 202.25 LBS

## 2022-06-16 DIAGNOSIS — O09.299 H/O PRE-ECLAMPSIA IN PRIOR PREGNANCY, CURRENTLY PREGNANT: ICD-10-CM

## 2022-06-16 DIAGNOSIS — O22.03 OBSTETRIC VARICOSE VEINS IN THIRD TRIMESTER: ICD-10-CM

## 2022-06-16 DIAGNOSIS — I80.01 SUPERFICIAL PHLEBITIS OF RIGHT LEG: ICD-10-CM

## 2022-06-16 PROCEDURE — 99207 PR COMPLICATED OB VISIT: CPT | Performed by: ADVANCED PRACTICE MIDWIFE

## 2022-06-16 NOTE — PROGRESS NOTES
31w2d  Feels well.   Was seen in the ED last week for a superficial phlebitis.  Has been wearing her stocking and doing warm compresses once a day.   Feels it has gotten a bit better.  Reviewed s/s to call with or with advancing clot.  Discussed increasing compresses, continue activity and wearing stockings.   Surgical consult placed to see Dr Mccann for pregnancy assessment and post partum planning  Reviewed with Dr Mckeon  Fetal Movement: positive, denies loss of fluid/vb, no contractions  Fetal kick counts/movement reviewed  Reviewed PTL precautions and s/sx of preeclampsia; denies any S&S and aware of what to report     Has been checking her blood pressure and has been normal.   Peds chosen: Yes    Plans to breastfeed Yes      Return to clinic 1 weeks    Germaine Ann, IMAN, CNM

## 2022-06-20 ENCOUNTER — OFFICE VISIT (OUTPATIENT)
Dept: SURGERY | Facility: CLINIC | Age: 32
End: 2022-06-20
Attending: ADVANCED PRACTICE MIDWIFE
Payer: COMMERCIAL

## 2022-06-20 VITALS
WEIGHT: 202 LBS | TEMPERATURE: 97.3 F | SYSTOLIC BLOOD PRESSURE: 130 MMHG | BODY MASS INDEX: 33.66 KG/M2 | HEIGHT: 65 IN | DIASTOLIC BLOOD PRESSURE: 72 MMHG

## 2022-06-20 DIAGNOSIS — I80.01 SUPERFICIAL PHLEBITIS OF RIGHT LEG: ICD-10-CM

## 2022-06-20 DIAGNOSIS — O22.03 OBSTETRIC VARICOSE VEINS IN THIRD TRIMESTER: ICD-10-CM

## 2022-06-20 DIAGNOSIS — I83.813 VARICOSE VEINS OF BILATERAL LOWER EXTREMITIES WITH PAIN: Primary | ICD-10-CM

## 2022-06-20 DIAGNOSIS — I83.893 VARICOSE VEINS OF BOTH LEGS WITH EDEMA: ICD-10-CM

## 2022-06-20 PROCEDURE — 99244 OFF/OP CNSLTJ NEW/EST MOD 40: CPT | Performed by: SPECIALIST

## 2022-06-20 ASSESSMENT — PAIN SCALES - GENERAL: PAINLEVEL: NO PAIN (0)

## 2022-06-20 NOTE — PROGRESS NOTES
Consult requested by Fidelina blackmon      Reason for consultation is bilateral varicose veins    HPI:  This is a 31-year-old white female who is 32 weeks gravid who after her first pregnancy several years ago developed bilateral lower extremity varicose veins.  They resolved with delivery and now with her most recent pregnancy she has veins worse than the previous one and reports pain achiness and swelling worse at the end of the day.  She also developed some superficial phlebitis in her right lower extremity about 2 weeks ago.  This is steadily improving.  She denies any leg trauma, DVT, superficial bites or nonhealing wounds.  There is a family history of varicose veins in her mother.  She is wearing compression but with only limited relief of her symptoms.  She now presents to me for evaluation of her varicose veins    Past Medical History:   Diagnosis Date     NO ACTIVE PROBLEMS      Past Surgical History:   Procedure Laterality Date     NO HISTORY OF SURGERY       Current Outpatient Medications   Medication     Ascorbic Acid (VITAMIN C) 500 MG PACK     aspirin 81 MG EC tablet     FIBER ADULT GUMMIES PO     Prenatal Vit-Fe Fumarate-FA (PRENATAL VITAMIN PO)     No current facility-administered medications for this visit.      No Known Allergies    Social History     Socioeconomic History     Marital status:      Spouse name: Rex     Number of children: 0     Years of education: Not on file     Highest education level: Not on file   Occupational History     Occupation:    Tobacco Use     Smoking status: Never Smoker     Smokeless tobacco: Never Used   Vaping Use     Vaping Use: Never used   Substance and Sexual Activity     Alcohol use: Not Currently     Drug use: No     Sexual activity: Yes     Partners: Male     Birth control/protection: None   Other Topics Concern     Parent/sibling w/ CABG, MI or angioplasty before 65F 55M? Not Asked   Social History Narrative     Not on file      Social Determinants of Health     Financial Resource Strain: Not on file   Food Insecurity: Not on file   Transportation Needs: Not on file   Physical Activity: Not on file   Stress: Not on file   Social Connections: Not on file   Intimate Partner Violence: Not on file   Housing Stability: Not on file     No family history on file.     ROS: 10 point ROS neg other than the symptoms noted above in the HPI.    PE:  B/P: 130/72, T: 97.3, P: Data Unavailable, R: Data Unavailable  General: well developed, well nourished WG who appears their stated age  HEENT: NC/AT, EOMI, (-)icterus, (-)injection  Neck: Supple, No JVD  Chest: CTA  Heart: S1, S2, (-)m/r/g  Abd: Soft,, gravid uterus   Ext; Warm, +1 edema bilaterally.  Extensive bilateral lower extremity varicose veins and spider veins.  Psych: AAOx3  Neuro: No focal deficits      Impression/plan:  This is a 31-year-old pregnant lady with bilateral varicose veins that are symptomatic despite compression.  She is a CEAP 3 bilaterally.  She also has superficial phlebitis which is improving.  I did explain that superficial Vitas is generally self-limiting.  She is on a baby aspirin for this.  I discussed the etiology of varicose veins and the timing of further evaluation and treatment in regards to pregnancy.  She expressed understanding.  After discussion with the patient the plan at this time is to continue her in her compression socks and have her follow-up with me in 6 to 8 weeks postpartum.  She will wear her compression socks during that time.  Should she be symptomatic despite compression therapy then an ultrasound to be ordered and plan for further treatment will be initiated.  She also understands that her spider veins are considered cosmetic.  She knows to be seen sooner should problems develop.    Wai Curiel MD, FACS

## 2022-06-20 NOTE — LETTER
6/20/2022         RE: Binta Peterson  [address] [facility] [address]        Dear Colleague,    Thank you for referring your patient, Binta Peterson, to the [facility]. Please see a copy of my visit note below.    Consult requested by Fidelina blackmon      Reason for consultation is bilateral varicose veins    HPI:  This is a 31-year-old white female who is 32 weeks gravid who after her first pregnancy several years ago developed bilateral lower extremity varicose veins.  They resolved with delivery and now with her most recent pregnancy she has veins worse than the previous one and reports pain achiness and swelling worse at the end of the day.  She also developed some superficial phlebitis in her right lower extremity about 2 weeks ago.  This is steadily improving.  She denies any leg trauma, DVT, superficial bites or nonhealing wounds.  There is a family history of varicose veins in her mother.  She is wearing compression but with only limited relief of her symptoms.  She now presents to me for evaluation of her varicose veins    Past Medical History:   Diagnosis Date     NO ACTIVE PROBLEMS      Past Surgical History:   Procedure Laterality Date     NO HISTORY OF SURGERY       Current Outpatient Medications   Medication     Ascorbic Acid (VITAMIN C) 500 MG PACK     aspirin 81 MG EC tablet     FIBER ADULT GUMMIES PO     Prenatal Vit-Fe Fumarate-FA (PRENATAL VITAMIN PO)     No current facility-administered medications for this visit.      No Known Allergies    Social History     Socioeconomic History     Marital status:      Spouse name: Rex     Number of children: 0     Years of education: Not on file     Highest education level: Not on file   Occupational History     Occupation:    Tobacco Use     Smoking status: Never Smoker     Smokeless tobacco: Never Used   Vaping Use     Vaping Use: Never used   Substance and Sexual Activity     Alcohol use:  Not Currently     Drug use: No     Sexual activity: Yes     Partners: Male     Birth control/protection: None   Other Topics Concern     Parent/sibling w/ CABG, MI or angioplasty before 65F 55M? Not Asked   Social History Narrative     Not on file     Social Determinants of Health     Financial Resource Strain: Not on file   Food Insecurity: Not on file   Transportation Needs: Not on file   Physical Activity: Not on file   Stress: Not on file   Social Connections: Not on file   Intimate Partner Violence: Not on file   Housing Stability: Not on file     No family history on file.     ROS: 10 point ROS neg other than the symptoms noted above in the HPI.    PE:  B/P: 130/72, T: 97.3, P: Data Unavailable, R: Data Unavailable  General: well developed, well nourished WG who appears their stated age  HEENT: NC/AT, EOMI, (-)icterus, (-)injection  Neck: Supple, No JVD  Chest: CTA  Heart: S1, S2, (-)m/r/g  Abd: Soft,, gravid uterus   Ext; Warm, +1 edema bilaterally.  Extensive bilateral lower extremity varicose veins and spider veins.  Psych: AAOx3  Neuro: No focal deficits      Impression/plan:  This is a 31-year-old pregnant lady with bilateral varicose veins that are symptomatic despite compression.  She is a CEAP 3 bilaterally.  She also has superficial phlebitis which is improving.  I did explain that superficial Vitas is generally self-limiting.  She is on a baby aspirin for this.  I discussed the etiology of varicose veins and the timing of further evaluation and treatment in regards to pregnancy.  She expressed understanding.  After discussion with the patient the plan at this time is to continue her in her compression socks and have her follow-up with me in 6 to 8 weeks postpartum.  She will wear her compression socks during that time.  Should she be symptomatic despite compression therapy then an ultrasound to be ordered and plan for further treatment will be initiated.  She also understands that her spider veins  are considered cosmetic.  She knows to be seen sooner should problems develop.    Wai Curiel MD, FACS      Again, thank you for allowing me to participate in the care of your patient.        Sincerely,        Wai Curiel MD

## 2022-06-23 ENCOUNTER — PRENATAL OFFICE VISIT (OUTPATIENT)
Dept: OBGYN | Facility: OTHER | Age: 32
End: 2022-06-23
Payer: COMMERCIAL

## 2022-06-23 VITALS
BODY MASS INDEX: 34.62 KG/M2 | DIASTOLIC BLOOD PRESSURE: 88 MMHG | HEART RATE: 96 BPM | OXYGEN SATURATION: 100 % | SYSTOLIC BLOOD PRESSURE: 129 MMHG | WEIGHT: 205.5 LBS

## 2022-06-23 DIAGNOSIS — O22.03 OBSTETRIC VARICOSE VEINS IN THIRD TRIMESTER: ICD-10-CM

## 2022-06-23 DIAGNOSIS — O09.299 H/O PRE-ECLAMPSIA IN PRIOR PREGNANCY, CURRENTLY PREGNANT: Primary | ICD-10-CM

## 2022-06-23 DIAGNOSIS — I80.01 SUPERFICIAL PHLEBITIS OF RIGHT LEG: ICD-10-CM

## 2022-06-23 PROBLEM — Z23 NEED FOR TDAP VACCINATION: Status: RESOLVED | Noted: 2019-11-25 | Resolved: 2022-06-23

## 2022-06-23 PROCEDURE — 99207 PR PRENATAL VISIT: CPT | Performed by: OBSTETRICS & GYNECOLOGY

## 2022-06-23 NOTE — PROGRESS NOTES
Presents for routine  appointment.     No complaints.  No headaches or vision changes. No abdominal pain or nausea. BPs normal at home.    No abnormal discharge, no leaking fluid, no contractions, no vaginal bleeding   ROS:   and GI  negative.     Please see Prenatal Vitals and Notes Flowsheet for objective data.    A/P:  31 year old  at 32w2d       ICD-10-CM    1. H/O pre-eclampsia in prior pregnancy, currently pregnant  O09.299    2. Superficial phlebitis of right leg  I80.01    3. Obstetric varicose veins in third trimester  O22.03         Tdap given at previous visit    She recently saw Dr. Curiel for varicose veins and she will follow up with him postpartum.  The spot of the thrombophlebitis continues to get better. She will continue to use compression stockings.   Low dose aspirin for preeclampsia risk reduction   Continue to monitor BPs at home.   Follow up in 2 weeks.      Breanne Mckeon MD

## 2022-06-23 NOTE — PATIENT INSTRUCTIONS
If you have labs or imaging done, the results will automatically release in [employer] without an interpretation.  Your health care professional will review those results and send an interpretation with recommendations as soon as possible, but this may be 1-3 business days.    If you have any questions regarding your visit, please contact your care team.     [employer]: [phone]  [facility] CLINIC HOURS TELEPHONE NUMBER       Breanne Mckeon MD  Assistant Medical Director    Ela - Certified Medical Assistant     Murali Butler-MARISABEL Watts-  [place]-     Monday- [place]  8:00 a.m - 5:00 p.m    Tuesday- Surgery        Thursday- [place]  8:00 a.m - 5:00 p.m.    Friday- [place]  7:30 a.m - 4:00 p.m. [facility]  [address]  [place] [address]  [phone] Fax  Imaging Scheduling [phone]    [facility] Labor and Delivery  [address] [place] [address] Dr.  [place], [place] [address] [phone]    [facility]  [address] [place], [place] [address] [phone] Fax  Imaging Scheduling [phone]     Urgent Care locations:  [facility] Monday-Friday  10 am - 8 pm  Saturday and Sunday   9 am - 5 pm  Monday-Friday   10 am - 8 pm  Saturday and Sunday   9 am - 5 pm   [phone]     **Surgeries** Our Surgery Schedulers will contact you to schedule. If you do not receive a call within 3 business days, please call [phone].    If you need a medication refill, please contact your pharmacy. Please allow 3 business days for your refill to be completed.    As always, thank you for trusting us with your healthcare needs!

## 2022-06-29 ENCOUNTER — ANCILLARY PROCEDURE (OUTPATIENT)
Dept: ULTRASOUND IMAGING | Facility: OTHER | Age: 32
End: 2022-06-29
Attending: ADVANCED PRACTICE MIDWIFE
Payer: COMMERCIAL

## 2022-06-29 DIAGNOSIS — O22.03 OBSTETRIC VARICOSE VEINS IN THIRD TRIMESTER: ICD-10-CM

## 2022-06-29 DIAGNOSIS — O09.299 H/O PRE-ECLAMPSIA IN PRIOR PREGNANCY, CURRENTLY PREGNANT: ICD-10-CM

## 2022-06-29 PROCEDURE — 76816 OB US FOLLOW-UP PER FETUS: CPT | Mod: TC | Performed by: RADIOLOGY

## 2022-07-07 ENCOUNTER — PRENATAL OFFICE VISIT (OUTPATIENT)
Dept: OBGYN | Facility: OTHER | Age: 32
End: 2022-07-07
Payer: COMMERCIAL

## 2022-07-07 VITALS
SYSTOLIC BLOOD PRESSURE: 124 MMHG | DIASTOLIC BLOOD PRESSURE: 82 MMHG | BODY MASS INDEX: 35.13 KG/M2 | HEART RATE: 108 BPM | WEIGHT: 208.5 LBS

## 2022-07-07 DIAGNOSIS — I80.01 SUPERFICIAL PHLEBITIS OF RIGHT LEG: ICD-10-CM

## 2022-07-07 DIAGNOSIS — Z34.93 NORMAL PREGNANCY IN THIRD TRIMESTER: Primary | ICD-10-CM

## 2022-07-07 PROCEDURE — 99207 PR PRENATAL VISIT: CPT | Performed by: ADVANCED PRACTICE MIDWIFE

## 2022-07-07 NOTE — PROGRESS NOTES
34w2d  Feels well.  Has been wearing support hose. Had appointment with vein specialist and he will follow her after delivery.  Feels the superficial phlebitis is improving and nothing new has formed  Fetal Movement: positive, denies loss of fluid/vb, occasional  contractions  Fetal kick counts/movement reviewed    knows PTL precautions and s/sx of preeclampsia; denies any S&S and aware of what to repor   Taking hospital tour?  Yes  Have car seat Yes  Discussed GBS/cx check at next visit  Return to clinic 2 weeks    IMAN Hurtado, CNM

## 2022-07-18 ENCOUNTER — PRENATAL OFFICE VISIT (OUTPATIENT)
Dept: OBGYN | Facility: OTHER | Age: 32
End: 2022-07-18
Payer: COMMERCIAL

## 2022-07-18 VITALS
WEIGHT: 209.56 LBS | DIASTOLIC BLOOD PRESSURE: 88 MMHG | SYSTOLIC BLOOD PRESSURE: 122 MMHG | BODY MASS INDEX: 35.31 KG/M2 | HEART RATE: 123 BPM

## 2022-07-18 DIAGNOSIS — Z34.93 NORMAL PREGNANCY IN THIRD TRIMESTER: Primary | ICD-10-CM

## 2022-07-18 PROCEDURE — 87653 STREP B DNA AMP PROBE: CPT | Performed by: ADVANCED PRACTICE MIDWIFE

## 2022-07-18 PROCEDURE — 99207 PR PRENATAL VISIT: CPT | Performed by: ADVANCED PRACTICE MIDWIFE

## 2022-07-18 NOTE — PROGRESS NOTES
Feeling well.  No complaints.   Wearing her stockings most of the time.   Her superficial clot continues to improve.   Discussed labor instructions.   GBS collected today.   No significant contra/lof/vb/JOHNSON/VD  RTC 1 wk  IMAN Hurtado, SURYA

## 2022-07-19 LAB — GP B STREP DNA SPEC QL NAA+PROBE: NEGATIVE

## 2022-07-21 NOTE — PATIENT INSTRUCTIONS
If you have labs or imaging done, the results will automatically release in [employer] without an interpretation.  Your health care professional will review those results and send an interpretation with recommendations as soon as possible, but this may be 1-3 business days.    If you have any questions regarding your visit, please contact your care team.     [employer]: [phone]  [facility] CLINIC HOURS TELEPHONE NUMBER       Breanne Mckeon MD  Assistant Medical Director    Ela - Certified Medical Assistant     Murali Butler-MARISABEL Watts-  [place]-     Monday- [place]  8:00 a.m - 5:00 p.m    Tuesday- Surgery        Thursday- [place]  8:00 a.m - 5:00 p.m.    Friday- [place]  7:30 a.m - 4:00 p.m. [facility]  [address]  [place] [address]  [phone] Fax  Imaging Scheduling [phone]    [facility] Labor and Delivery  [address] [place] [address] Dr.  [place], [place] [address] [phone]    [facility]  [address] [place], [place] [address] [phone] Fax  Imaging Scheduling [phone]     Urgent Care locations:  [facility] Monday-Friday  10 am - 8 pm  Saturday and Sunday   9 am - 5 pm  Monday-Friday   10 am - 8 pm  Saturday and Sunday   9 am - 5 pm   [phone]     **Surgeries** Our Surgery Schedulers will contact you to schedule. If you do not receive a call within 3 business days, please call [phone].    If you need a medication refill, please contact your pharmacy. Please allow 3 business days for your refill to be completed.    As always, thank you for trusting us with your healthcare needs!

## 2022-07-28 ENCOUNTER — PRENATAL OFFICE VISIT (OUTPATIENT)
Dept: OBGYN | Facility: OTHER | Age: 32
End: 2022-07-28
Payer: COMMERCIAL

## 2022-07-28 VITALS
HEART RATE: 80 BPM | DIASTOLIC BLOOD PRESSURE: 82 MMHG | WEIGHT: 213 LBS | SYSTOLIC BLOOD PRESSURE: 142 MMHG | BODY MASS INDEX: 35.89 KG/M2

## 2022-07-28 DIAGNOSIS — I80.01 SUPERFICIAL PHLEBITIS OF RIGHT LEG: ICD-10-CM

## 2022-07-28 DIAGNOSIS — O09.299 H/O PRE-ECLAMPSIA IN PRIOR PREGNANCY, CURRENTLY PREGNANT: Primary | ICD-10-CM

## 2022-07-28 PROCEDURE — 99207 PR PRENATAL VISIT: CPT | Performed by: OBSTETRICS & GYNECOLOGY

## 2022-07-28 NOTE — PROGRESS NOTES
Presents for routine  appointment.    No headache, no vision changes, no abdominal pain.  Just took the stairs today.  BPs are normal at home.   No abnormal discharge , no leaking fluid, no contractions aside from BH, no vaginal bleeding    ROS:   and GI  negative.     Please see Prenatal Vitals and Notes Flowsheet for objective data.      A/P:  31 year old  at 37w2d       ICD-10-CM    1. H/O pre-eclampsia in prior pregnancy, currently pregnant  O09.299    2. Superficial phlebitis of right leg  I80.01        Labor precautions given   Low dose aspirin for preeclampsia risk reduction   Continue to monitor BPs at home.  - To L&D now for rule out PreE  Group B Strep negative  Follow up in 1 week if undelivered.      Breanne Mckeon MD

## 2022-08-01 ENCOUNTER — PRENATAL OFFICE VISIT (OUTPATIENT)
Dept: OBGYN | Facility: OTHER | Age: 32
End: 2022-08-01
Payer: COMMERCIAL

## 2022-08-01 VITALS — BODY MASS INDEX: 35.89 KG/M2 | DIASTOLIC BLOOD PRESSURE: 92 MMHG | WEIGHT: 213 LBS | SYSTOLIC BLOOD PRESSURE: 149 MMHG

## 2022-08-01 DIAGNOSIS — R03.0 ELEVATED BLOOD PRESSURE READING WITHOUT DIAGNOSIS OF HYPERTENSION: Primary | ICD-10-CM

## 2022-08-01 DIAGNOSIS — O09.299 H/O PRE-ECLAMPSIA IN PRIOR PREGNANCY, CURRENTLY PREGNANT: ICD-10-CM

## 2022-08-01 DIAGNOSIS — O22.03 OBSTETRIC VARICOSE VEINS IN THIRD TRIMESTER: ICD-10-CM

## 2022-08-01 DIAGNOSIS — Z34.93 NORMAL PREGNANCY IN THIRD TRIMESTER: ICD-10-CM

## 2022-08-01 LAB
ALBUMIN SERPL-MCNC: 2.5 G/DL (ref 3.4–5)
ALP SERPL-CCNC: 99 U/L (ref 40–150)
ALT SERPL W P-5'-P-CCNC: 21 U/L (ref 0–50)
ANION GAP SERPL CALCULATED.3IONS-SCNC: 4 MMOL/L (ref 3–14)
AST SERPL W P-5'-P-CCNC: 22 U/L (ref 0–45)
BILIRUB SERPL-MCNC: 0.2 MG/DL (ref 0.2–1.3)
BUN SERPL-MCNC: 11 MG/DL (ref 7–30)
CALCIUM SERPL-MCNC: 9 MG/DL (ref 8.5–10.1)
CHLORIDE BLD-SCNC: 107 MMOL/L (ref 94–109)
CO2 SERPL-SCNC: 28 MMOL/L (ref 20–32)
CREAT SERPL-MCNC: 0.62 MG/DL (ref 0.52–1.04)
CREAT UR-MCNC: 16 MG/DL
ERYTHROCYTE [DISTWIDTH] IN BLOOD BY AUTOMATED COUNT: 13.5 % (ref 10–15)
GFR SERPL CREATININE-BSD FRML MDRD: >90 ML/MIN/1.73M2
GLUCOSE BLD-MCNC: 87 MG/DL (ref 70–99)
HCT VFR BLD AUTO: 31 % (ref 35–47)
HGB BLD-MCNC: 9.7 G/DL (ref 11.7–15.7)
MCH RBC QN AUTO: 27.9 PG (ref 26.5–33)
MCHC RBC AUTO-ENTMCNC: 31.3 G/DL (ref 31.5–36.5)
MCV RBC AUTO: 89 FL (ref 78–100)
PLATELET # BLD AUTO: 293 10E3/UL (ref 150–450)
POTASSIUM BLD-SCNC: 3.9 MMOL/L (ref 3.4–5.3)
PROT SERPL-MCNC: 6.5 G/DL (ref 6.8–8.8)
PROT UR-MCNC: <0.05 G/L
PROT/CREAT 24H UR: NORMAL MG/G{CREAT}
RBC # BLD AUTO: 3.48 10E6/UL (ref 3.8–5.2)
SODIUM SERPL-SCNC: 139 MMOL/L (ref 133–144)
WBC # BLD AUTO: 8.5 10E3/UL (ref 4–11)

## 2022-08-01 PROCEDURE — 85027 COMPLETE CBC AUTOMATED: CPT | Performed by: OBSTETRICS & GYNECOLOGY

## 2022-08-01 PROCEDURE — 84156 ASSAY OF PROTEIN URINE: CPT | Performed by: OBSTETRICS & GYNECOLOGY

## 2022-08-01 PROCEDURE — 80053 COMPREHEN METABOLIC PANEL: CPT | Performed by: OBSTETRICS & GYNECOLOGY

## 2022-08-01 PROCEDURE — 99207 PR PRENATAL VISIT: CPT | Performed by: OBSTETRICS & GYNECOLOGY

## 2022-08-01 PROCEDURE — 36415 COLL VENOUS BLD VENIPUNCTURE: CPT | Performed by: OBSTETRICS & GYNECOLOGY

## 2022-08-01 NOTE — PROGRESS NOTES
31 year old  at 37w6d weeks presents to the clinic for a routine prenatal visit.  Blood pressure=162/99.  Nzugvu=782/92. At home this weekend it was 109/80's.  At home today is was 120/80's.  No headache or vision changes.  No N/V.    Patient has intense left sided sciatic pain today  Complains of feeling more pressure.  No vaginal bleeding, leakage of fluid, or contractions   Fundal height=38cm  MAKd=306  CX=  Discussed labor precautions  I recommended patient have PIH labs done today and will let her know those results ASAP. She is to recheck her blood pressure at home after this appointment.  BPP this week.  Repeat appointment Friday.  Pre-E precautions given.  Varicose veins=stable  GBS=Negative    Annette Mckeon,

## 2022-08-03 ENCOUNTER — ANCILLARY PROCEDURE (OUTPATIENT)
Dept: ULTRASOUND IMAGING | Facility: OTHER | Age: 32
End: 2022-08-03
Attending: OBSTETRICS & GYNECOLOGY
Payer: COMMERCIAL

## 2022-08-03 DIAGNOSIS — R03.0 ELEVATED BLOOD PRESSURE READING WITHOUT DIAGNOSIS OF HYPERTENSION: ICD-10-CM

## 2022-08-03 PROCEDURE — 76819 FETAL BIOPHYS PROFIL W/O NST: CPT | Mod: TC | Performed by: RADIOLOGY

## 2022-08-05 ENCOUNTER — PRENATAL OFFICE VISIT (OUTPATIENT)
Dept: OBGYN | Facility: OTHER | Age: 32
End: 2022-08-05
Payer: COMMERCIAL

## 2022-08-05 VITALS
DIASTOLIC BLOOD PRESSURE: 95 MMHG | HEART RATE: 86 BPM | WEIGHT: 213.9 LBS | BODY MASS INDEX: 36.04 KG/M2 | SYSTOLIC BLOOD PRESSURE: 156 MMHG

## 2022-08-05 DIAGNOSIS — Z34.93 NORMAL PREGNANCY IN THIRD TRIMESTER: Primary | ICD-10-CM

## 2022-08-05 DIAGNOSIS — O13.3 GESTATIONAL HYPERTENSION, THIRD TRIMESTER: ICD-10-CM

## 2022-08-05 DIAGNOSIS — O09.299 H/O PRE-ECLAMPSIA IN PRIOR PREGNANCY, CURRENTLY PREGNANT: ICD-10-CM

## 2022-08-05 DIAGNOSIS — O22.03 OBSTETRIC VARICOSE VEINS IN THIRD TRIMESTER: ICD-10-CM

## 2022-08-05 PROCEDURE — 99207 PR PRENATAL VISIT: CPT | Performed by: OBSTETRICS & GYNECOLOGY

## 2022-08-05 NOTE — PROGRESS NOTES
31 year old  at 38w3d weeks presents to the clinic for a prenatal visit to check her blood pressure   SO=535/95.  Repeat 156/95.  No headache or vision changes.  No RUQ or epigastric pain.  No N/V  BP at home this week has been creeping iz=556-085/80-90's  Complains of feeling more pressure.  No vaginal bleeding, leakage of fluid, or contractions   Fundal height=39cm  CYJi=983  CX=deferred (/-2 on )  Discussed labor precautions  GBS=Negative  Due to patient's elevated blood pressures and gestational age, I recommended she go to L&D for an induction for GHTN.  I called and spoke with the Charge RN, Kayli.  Patient verbalizes understanding    Annette Mckeon,

## 2022-08-06 PROBLEM — O13.3 GESTATIONAL HYPERTENSION, THIRD TRIMESTER: Status: ACTIVE | Noted: 2022-08-06

## 2022-08-10 ENCOUNTER — MYC MEDICAL ADVICE (OUTPATIENT)
Dept: OBGYN | Facility: OTHER | Age: 32
End: 2022-08-10

## 2022-08-10 NOTE — TELEPHONE ENCOUNTER
Pt delivered 8/5/22. She was induced for GHTN. Pt was taking aspirin with pregnancy and discharge instructions include continued use of aspirin. Pt asking if she should continue to take.    Routing to Dr. Mckeon for advisement.

## 2022-08-16 ENCOUNTER — ALLIED HEALTH/NURSE VISIT (OUTPATIENT)
Dept: FAMILY MEDICINE | Facility: OTHER | Age: 32
End: 2022-08-16
Payer: COMMERCIAL

## 2022-08-16 VITALS — SYSTOLIC BLOOD PRESSURE: 128 MMHG | DIASTOLIC BLOOD PRESSURE: 88 MMHG

## 2022-08-16 DIAGNOSIS — Z01.30 BP CHECK: Primary | ICD-10-CM

## 2022-08-16 PROCEDURE — 99207 PR NO CHARGE NURSE ONLY: CPT

## 2022-08-16 NOTE — PROGRESS NOTES
Binta Peterson is a 31 year old patient who comes in today for a Blood Pressure check.  Initial BP:  /88   LMP 11/09/2021      Data Unavailable  Disposition: results routed to provider     Carmen Bucio MA

## 2022-08-19 ENCOUNTER — MYC MEDICAL ADVICE (OUTPATIENT)
Dept: OBGYN | Facility: OTHER | Age: 32
End: 2022-08-19

## 2022-08-22 NOTE — TELEPHONE ENCOUNTER
Pt had a  on 22.    Went into the [facility] ER on  to rule out blood clot.  At the ER they prescribed aspirin 325 mg once daily for 14 days  and labetalol 100 mg BID due to high blood pressure.  Pt states when she returned from the ER her BP was 109/81 and they have been ranging from 110/75 to 121/85.  Pt has not taken the labetalol yet, but has started the aspirin, since her blood pressures have seemed to be normal.  She is wondering if she should start the labetalol.    Routing to Dr. Mckeon for further recommendations if pt should start the labetalol prescribed to her by the [facility] ER on  due to her BPs being in range.    Carrie Unger RN

## 2022-08-22 NOTE — TELEPHONE ENCOUNTER
No, patient does not need the blood pressure medication if her blood pressure are running at those ranges.  She should continue to take her blood pressures at home and let me know if they are 140/90.  Thanks!    ~Annette Mckeon, DO

## 2022-09-09 ENCOUNTER — MEDICAL CORRESPONDENCE (OUTPATIENT)
Dept: HEALTH INFORMATION MANAGEMENT | Facility: CLINIC | Age: 32
End: 2022-09-09

## 2022-09-18 ENCOUNTER — HEALTH MAINTENANCE LETTER (OUTPATIENT)
Age: 32
End: 2022-09-18

## 2022-09-20 ENCOUNTER — TELEPHONE (OUTPATIENT)
Dept: OBGYN | Facility: CLINIC | Age: 32
End: 2022-09-20

## 2022-09-20 ENCOUNTER — MYC MEDICAL ADVICE (OUTPATIENT)
Dept: OBGYN | Facility: OTHER | Age: 32
End: 2022-09-20

## 2022-09-20 RX ORDER — BREAST PUMP
1 EACH MISCELLANEOUS DAILY PRN
Qty: 1 EACH | Refills: 0 | Status: SHIPPED | OUTPATIENT
Start: 2022-09-20 | End: 2023-11-06

## 2022-09-20 NOTE — TELEPHONE ENCOUNTER
Pt is writing in stating that [facility] never received the faxed breast pump rx.    Printed the rx off again and faxed to provided fax number.    Carrie Unger RN

## 2022-09-20 NOTE — TELEPHONE ENCOUNTER
Spoke with patient.  She needs to script fax over for breast pump.  Reprinted and faxed.     to [phone]  [facility].    Rich Li, RODRÍGUEZN, RN, PHN  [facility] ~ Registered Nurse  Clinic Triage ~ [facility]  September 20, 2022

## 2022-09-22 NOTE — TELEPHONE ENCOUNTER
Form received in ER from [facility] for a breast pump. Filled out form, printed electronic prescription as well and faxed that to [facility].   Deepthi Byrne, CMA

## 2022-09-23 ENCOUNTER — PRENATAL OFFICE VISIT (OUTPATIENT)
Dept: OBGYN | Facility: OTHER | Age: 32
End: 2022-09-23
Payer: COMMERCIAL

## 2022-09-23 VITALS — SYSTOLIC BLOOD PRESSURE: 129 MMHG | BODY MASS INDEX: 31.17 KG/M2 | DIASTOLIC BLOOD PRESSURE: 85 MMHG | WEIGHT: 185 LBS

## 2022-09-23 DIAGNOSIS — Z30.011 ENCOUNTER FOR INITIAL PRESCRIPTION OF CONTRACEPTIVE PILLS: ICD-10-CM

## 2022-09-23 PROBLEM — O22.03 OBSTETRIC VARICOSE VEINS IN THIRD TRIMESTER: Status: RESOLVED | Noted: 2022-02-14 | Resolved: 2022-09-23

## 2022-09-23 PROBLEM — O09.299 H/O PRE-ECLAMPSIA IN PRIOR PREGNANCY, CURRENTLY PREGNANT: Status: RESOLVED | Noted: 2022-02-14 | Resolved: 2022-09-23

## 2022-09-23 PROBLEM — I80.01 SUPERFICIAL PHLEBITIS OF RIGHT LEG: Status: RESOLVED | Noted: 2022-06-16 | Resolved: 2022-09-23

## 2022-09-23 PROBLEM — O13.3 GESTATIONAL HYPERTENSION, THIRD TRIMESTER: Status: RESOLVED | Noted: 2022-08-06 | Resolved: 2022-09-23

## 2022-09-23 PROBLEM — Z34.93 NORMAL PREGNANCY IN THIRD TRIMESTER: Status: RESOLVED | Noted: 2019-11-25 | Resolved: 2022-09-23

## 2022-09-23 PROCEDURE — 99207 PR POST PARTUM EXAM: CPT | Performed by: OBSTETRICS & GYNECOLOGY

## 2022-09-23 RX ORDER — ACETAMINOPHEN AND CODEINE PHOSPHATE 120; 12 MG/5ML; MG/5ML
0.35 SOLUTION ORAL DAILY
Qty: 84 TABLET | Refills: 3 | Status: SHIPPED | OUTPATIENT
Start: 2022-09-23 | End: 2023-10-02

## 2022-09-23 ASSESSMENT — PATIENT HEALTH QUESTIONNAIRE - PHQ9
SUM OF ALL RESPONSES TO PHQ QUESTIONS 1-9: 0
SUM OF ALL RESPONSES TO PHQ QUESTIONS 1-9: 0

## 2022-09-23 ASSESSMENT — ANXIETY QUESTIONNAIRES
GAD7 TOTAL SCORE: 0
1. FEELING NERVOUS, ANXIOUS, OR ON EDGE: NOT AT ALL
4. TROUBLE RELAXING: NOT AT ALL
6. BECOMING EASILY ANNOYED OR IRRITABLE: NOT AT ALL
7. FEELING AFRAID AS IF SOMETHING AWFUL MIGHT HAPPEN: NOT AT ALL
3. WORRYING TOO MUCH ABOUT DIFFERENT THINGS: NOT AT ALL
2. NOT BEING ABLE TO STOP OR CONTROL WORRYING: NOT AT ALL
5. BEING SO RESTLESS THAT IT IS HARD TO SIT STILL: NOT AT ALL
7. FEELING AFRAID AS IF SOMETHING AWFUL MIGHT HAPPEN: NOT AT ALL
GAD7 TOTAL SCORE: 0
GAD7 TOTAL SCORE: 0

## 2022-09-23 NOTE — PATIENT INSTRUCTIONS
Please call if you any questions.    [address]   [address]  [phone]        Annette Mckeon,

## 2022-09-23 NOTE — PROGRESS NOTES
Subjective  32 year old non-pregnant female presents today for a postpartum visit.  Patient had an  on 2022.  No pain.  No vaginal bleeding.  No problems urinating.  Normal bowel movements.  Patient is breast feeding.  No signs and symptoms of ppd.  Patient scored a 0 on the PHQ-9 and a 0 on the NELA-7.  No thoughts of suicide or infanticide.  Patient is not due for a pap smear today.  Patient is wanting to do oral contractive pills for birth control.   She has not had intercourse.  Varicose veins.  I recommended patient follow up with a vein clinic.      ROS: 10 point ROS neg other than the symptoms noted above in the HPI.  Past Medical History:   Diagnosis Date     NO ACTIVE PROBLEMS      Past Surgical History:   Procedure Laterality Date     NO HISTORY OF SURGERY       History reviewed. No pertinent family history.  Social History     Tobacco Use     Smoking status: Never Smoker     Smokeless tobacco: Never Used   Substance Use Topics     Alcohol use: Not Currently         Objective  Vitals: /85 (BP Location: Right arm, Cuff Size: Adult Regular)   Wt 83.9 kg (185 lb)   LMP 2021   Breastfeeding Yes   BMI 31.17 kg/m    BMI= Body mass index is 31.17 kg/m .         Assessment  1.)  Post partum visit  2.)  S/p  on 2022  3.)  Birth control   4.)  Varicose veins       Plan  1.)  Oral contractive pills ordered  2.)  Follow-up with vein clinic      Annette Mckeon

## 2022-10-03 ENCOUNTER — OFFICE VISIT (OUTPATIENT)
Dept: SURGERY | Facility: CLINIC | Age: 32
End: 2022-10-03
Payer: COMMERCIAL

## 2022-10-03 VITALS
SYSTOLIC BLOOD PRESSURE: 120 MMHG | HEIGHT: 65 IN | TEMPERATURE: 97.8 F | WEIGHT: 185 LBS | DIASTOLIC BLOOD PRESSURE: 78 MMHG | BODY MASS INDEX: 30.82 KG/M2

## 2022-10-03 DIAGNOSIS — I83.813 VARICOSE VEINS OF BILATERAL LOWER EXTREMITIES WITH PAIN: Primary | ICD-10-CM

## 2022-10-03 DIAGNOSIS — I80.01 SUPERFICIAL PHLEBITIS OF RIGHT LEG: ICD-10-CM

## 2022-10-03 DIAGNOSIS — I83.893 VARICOSE VEINS OF BOTH LEGS WITH EDEMA: ICD-10-CM

## 2022-10-03 PROCEDURE — 99213 OFFICE O/P EST LOW 20 MIN: CPT | Performed by: SPECIALIST

## 2022-10-03 ASSESSMENT — PAIN SCALES - GENERAL: PAINLEVEL: NO PAIN (0)

## 2022-10-03 NOTE — PROGRESS NOTES
Follow-up for superficial phlebitis    Subjective:  This is a 32-year-old white female who was last seen for superficial bites when she was 32 weeks gravid.  She delivered August 7.  On August 19 she developed another recurrent superficial Vitas in her right lower extremity.  She was treated with a short course of aspirin and NSAIDs.  She now presents to me for follow-up.  She continues to have pain swelling and achiness worse at the end the day despite wearing compression therapy.  It is improved since having the baby.  She is thinking she like her veins evaluated more thoroughly.  She now presents to me for follow-up of her superficial phlebitis and varicose veins.      Objective:  B/P: 120/78, T: 97.8, P: Data Unavailable, R: Data Unavailable  Ext; Warm, +1 edema bilaterally.  Extensive bilateral lower extremity varicose veins and spider veins.  Thrombosed varicose vein right lateral calf.        Assessment/plan:  This is a 32-year-old lady with bilateral varicose veins that are symptomatic despite compression.  She is a CEAP 3 bilaterally.  She also has superficial phlebitis which is improving.  I did explain that superficial Vitas is generally self-limiting.  She is on a baby aspirin for this.  I discussed the etiology of varicose veins and the timing of further evaluation and treatment in regards to pregnancy.  She is most likely not going to have any more children.  She would like to consider treatment as she is symptomatic despite compression.  I discussed the role of ultrasound she expressed understanding.  After discussion with the patient the plan at this time is to an ultrasound of both lower extremities for reflux.  She will follow-up with me after that.      Wai Curiel MD, FACS

## 2022-10-03 NOTE — LETTER
10/3/2022         RE: Binta Peterson  [address] [facility] [address]        Dear Colleague,    Thank you for referring your patient, Binta Peterson, to the [facility]. Please see a copy of my visit note below.    Follow-up for superficial phlebitis    Subjective:  This is a 32-year-old white female who was last seen for superficial bites when she was 32 weeks gravid.  She delivered August 7.  On August 19 she developed another recurrent superficial Vitas in her right lower extremity.  She was treated with a short course of aspirin and NSAIDs.  She now presents to me for follow-up.  She continues to have pain swelling and achiness worse at the end the day despite wearing compression therapy.  It is improved since having the baby.  She is thinking she like her veins evaluated more thoroughly.  She now presents to me for follow-up of her superficial phlebitis and varicose veins.      Objective:  B/P: 120/78, T: 97.8, P: Data Unavailable, R: Data Unavailable  Ext; Warm, +1 edema bilaterally.  Extensive bilateral lower extremity varicose veins and spider veins.  Thrombosed varicose vein right lateral calf.        Assessment/plan:  This is a 32-year-old lady with bilateral varicose veins that are symptomatic despite compression.  She is a CEAP 3 bilaterally.  She also has superficial phlebitis which is improving.  I did explain that superficial Vitas is generally self-limiting.  She is on a baby aspirin for this.  I discussed the etiology of varicose veins and the timing of further evaluation and treatment in regards to pregnancy.  She is most likely not going to have any more children.  She would like to consider treatment as she is symptomatic despite compression.  I discussed the role of ultrasound she expressed understanding.  After discussion with the patient the plan at this time is to an ultrasound of both lower extremities for reflux.  She will follow-up with me after  that.      Wai Curiel MD, FACS      Again, thank you for allowing me to participate in the care of your patient.        Sincerely,        Wai Curiel MD

## 2022-10-04 ENCOUNTER — TELEPHONE (OUTPATIENT)
Dept: VASCULAR SURGERY | Facility: CLINIC | Age: 32
End: 2022-10-04

## 2022-10-11 ENCOUNTER — MEDICAL CORRESPONDENCE (OUTPATIENT)
Dept: HEALTH INFORMATION MANAGEMENT | Facility: CLINIC | Age: 32
End: 2022-10-11

## 2022-12-06 ENCOUNTER — OFFICE VISIT (OUTPATIENT)
Dept: VASCULAR SURGERY | Facility: CLINIC | Age: 32
End: 2022-12-06
Attending: SPECIALIST
Payer: COMMERCIAL

## 2022-12-06 ENCOUNTER — ANCILLARY PROCEDURE (OUTPATIENT)
Dept: ULTRASOUND IMAGING | Facility: CLINIC | Age: 32
End: 2022-12-06
Attending: SPECIALIST
Payer: COMMERCIAL

## 2022-12-06 DIAGNOSIS — I83.813 VARICOSE VEINS OF BILATERAL LOWER EXTREMITIES WITH PAIN: ICD-10-CM

## 2022-12-06 DIAGNOSIS — I83.813 VARICOSE VEINS OF BILATERAL LOWER EXTREMITIES WITH PAIN: Primary | ICD-10-CM

## 2022-12-06 PROCEDURE — 93970 EXTREMITY STUDY: CPT | Performed by: SPECIALIST

## 2022-12-06 PROCEDURE — 99213 OFFICE O/P EST LOW 20 MIN: CPT | Performed by: SPECIALIST

## 2022-12-06 NOTE — NURSING NOTE
Patient Reported symptoms:    Right leg   Heaviness None of the time   Achiness A little of the time   Swelling None of the time   Throbbing None of the time   Itching None of the time   Appearance Moderately noticeable   Impact on work/activities Symptoms but full able to participate    Left Leg   Heaviness None of the time   Achiness A little of the time   Swelling None of the time   Throbbing None of the time   Itching None of the time   Appearance Moderately noticeable   Impact on work/activities Symptoms but full able to participate

## 2022-12-06 NOTE — LETTER
12/6/2022         RE: Binta Peterson  [address] [facility] [address]        Dear Colleague,    Thank you for referring your patient, Binta Peterson, to the [facility] [place]. Please see a copy of my visit note below.    Follow-up for US  Subjective:  This is a 32-year-old white female who was last seen for superficial phlebitis when she was 32 weeks gravid.  She delivered August 7.  On August 19 she developed another recurrent superficial phlebitis in her right lower extremity.  She was treated with a short course of aspirin and NSAIDs.  She now presents to me for follow-up.  She continues to have pain swelling and achiness worse at the end the day despite wearing compression therapy.  It is improved since having the baby.      She has been wearing her compression.  She had her ultrasound which she now follows up    Objective:  B/P: 120/78, T: 97.8, P: Data Unavailable, R: Data Unavailable  Ext; Warm, +1 edema bilaterally.  Extensive bilateral lower extremity varicose veins and spider veins.  Thrombosed varicose vein right lateral calf.    Ultrasound preliminary: No significant superficial system reflux but multiple varicose veins    Assessment/plan:  This is a 32-year-old lady with bilateral varicose veins that are symptomatic despite compression.  She is a CEAP 3 bilaterally.  She also has superficial phlebitis which is improving.  I did explain that superficial phlebitis is generally self-limiting.  She is on a baby aspirin for this.  Her ultrasound not reveal any significant superficial system reflux.  Based on her ultrasound she is a candidate for bilateral stab phlebectomies.  I briefly described the procedure to the patient she expressed understanding.  She is also not sure she wants to have other children.  I did explain that even with treatment and she has other children she will most likely develop other varicose veins.  She expressed understanding.  She wishes to  speak to her  first before deciding how to proceed.  She will contact us if she wishes to go ahead with the procedure.    Wai Curiel MD, FACS      Again, thank you for allowing me to participate in the care of your patient.        Sincerely,        Wai Curiel MD

## 2022-12-06 NOTE — PROGRESS NOTES
Follow-up for US  Subjective:  This is a 32-year-old white female who was last seen for superficial phlebitis when she was 32 weeks gravid.  She delivered August 7.  On August 19 she developed another recurrent superficial phlebitis in her right lower extremity.  She was treated with a short course of aspirin and NSAIDs.  She now presents to me for follow-up.  She continues to have pain swelling and achiness worse at the end the day despite wearing compression therapy.  It is improved since having the baby.      She has been wearing her compression.  She had her ultrasound which she now follows up    Objective:  B/P: 120/78, T: 97.8, P: Data Unavailable, R: Data Unavailable  Ext; Warm, +1 edema bilaterally.  Extensive bilateral lower extremity varicose veins and spider veins.  Thrombosed varicose vein right lateral calf.    Ultrasound preliminary: No significant superficial system reflux but multiple varicose veins    Assessment/plan:  This is a 32-year-old lady with bilateral varicose veins that are symptomatic despite compression.  She is a CEAP 3 bilaterally.  She also has superficial phlebitis which is improving.  I did explain that superficial phlebitis is generally self-limiting.  She is on a baby aspirin for this.  Her ultrasound not reveal any significant superficial system reflux.  Based on her ultrasound she is a candidate for bilateral stab phlebectomies.  I briefly described the procedure to the patient she expressed understanding.  She is also not sure she wants to have other children.  I did explain that even with treatment and she has other children she will most likely develop other varicose veins.  She expressed understanding.  She wishes to speak to her  first before deciding how to proceed.  She will contact us if she wishes to go ahead with the procedure.    Wai Curiel MD, FACS

## 2023-05-07 ENCOUNTER — HEALTH MAINTENANCE LETTER (OUTPATIENT)
Age: 33
End: 2023-05-07

## 2023-09-25 ENCOUNTER — MYC MEDICAL ADVICE (OUTPATIENT)
Dept: OBGYN | Facility: OTHER | Age: 33
End: 2023-09-25
Payer: COMMERCIAL

## 2023-09-25 NOTE — TELEPHONE ENCOUNTER
LMP: 8/11.  History of irregular periods.  6w3d.    Pt took a positive home pregnancy test yesterday.

## 2023-10-02 ENCOUNTER — VIRTUAL VISIT (OUTPATIENT)
Dept: OBGYN | Facility: CLINIC | Age: 33
End: 2023-10-02
Payer: COMMERCIAL

## 2023-10-02 VITALS — HEIGHT: 64 IN | BODY MASS INDEX: 32.44 KG/M2 | WEIGHT: 190 LBS

## 2023-10-02 DIAGNOSIS — Z34.80 PRENATAL CARE, SUBSEQUENT PREGNANCY, UNSPECIFIED TRIMESTER: Primary | ICD-10-CM

## 2023-10-02 PROCEDURE — 99207 PR NO CHARGE NURSE ONLY: CPT

## 2023-10-02 NOTE — PROGRESS NOTES
Telephone visit with patient for New Prenatal Intake and Education. This is patient's 4th pregnancy, . Handouts reviewed and will be provided at next prenatal appointment. Scheduled for New Prenatal with Dr. Mckeon on .       Prenatal OB Questionnaire  Patient supplied answers from flow sheet for:  Prenatal OB Questionnaire.  Past Medical History  Have you ever recieved care for your mental health? : No  Have you ever been in a major accident or suffered serious trauma?: No  Within the last year, has anyone hit, slapped, kicked or otherwise hurt you?: No  In the last year, has anyone forced you to have sex when you didn't want to?: No    Past Medical History 2   Have you ever received a blood transfusion?: No  Would you accept a blood transfusion if was medically recommended?: Yes  Does anyone in your home smoke?: No   Is your blood type Rh negative?: No  Have you ever ?: (!) Yes  Have you been hospitalized for a nonsurgical reason excluding normal delivery?: No  Have you ever had an abnormal pap smear?: No    Past Medical History (Continued)  Do you have a history of abnormalities of the uterus?: No  Did your mother take CAT or any other hormones when she was pregnant with you?: Unknown  Do you have any other problems we have not asked about which you feel may be important to this pregnancy?: No        Allergies as of 10/2/2023:    Allergies as of 10/02/2023    (No Known Allergies)       Current medications are:  Current Outpatient Medications   Medication Sig Dispense Refill    Ascorbic Acid (VITAMIN C) 500 MG PACK       FIBER ADULT GUMMIES PO       Misc. Devices (BREAST PUMP) MISC 1 each daily as needed 1 each 0    norethindrone (MICRONOR) 0.35 MG tablet Take 1 tablet (0.35 mg) by mouth daily 84 tablet 3    Prenatal Vit-Fe Fumarate-FA (PRENATAL VITAMIN PO)            Early ultrasound screening tool:    Does patient have irregular periods?  No  Did patient use hormonal birth control in the three  months prior to positive urine pregnancy test? No  Is the patient breastfeeding?  No  Is the patient 10 weeks or greater at time of education visit?  No    Dating US ordered per new standing orders.  Advised to schedule for next week or later.      Thank you,  MARISABEL Butler

## 2023-10-02 NOTE — PATIENT INSTRUCTIONS
Learning About Pregnancy  Your Care Instructions     Your health in the early weeks of your pregnancy is particularly important for your baby's health. Take good care of yourself. Anything you do that harms your body can also harm your baby.  Make sure to go to all of your doctor appointments. Regular checkups will help keep you and your baby healthy.  How can you care for yourself at home?  Diet    Eat a balanced diet. Make sure your diet includes plenty of beans, peas, and leafy green vegetables.     Do not skip meals or go for many hours without eating. If you are nauseated, try to eat a small, healthy snack every 2 to 3 hours.     Do not eat fish that has a high level of mercury, such as shark, swordfish, or mackerel. Do not eat more than one can of tuna each week.     Drink plenty of fluids. If you have kidney, heart, or liver disease and have to limit fluids, talk with your doctor before you increase the amount of fluids you drink.     Cut down on caffeine, such as coffee, tea, and cola.     Do not drink alcohol, such as beer, wine, or hard liquor.     Take a multivitamin that contains at least 400 micrograms (mcg) of folic acid to help prevent birth defects. Fortified cereal and whole wheat bread are good additional sources of folic acid.     Increase the calcium in your diet. Try to drink a quart of skim milk each day. You may also take calcium supplements and choose foods such as cheese and yogurt.   Lifestyle    Make sure you go to your follow-up appointments.     Get plenty of rest. You may be unusually tired while you are pregnant.     Get at least 30 minutes of exercise on most days of the week. Walking is a good choice. If you have not exercised in the past, start out slowly. Take several short walks each day.     Do not smoke. If you need help quitting, talk to your doctor about stop-smoking programs. These can increase your chances of quitting for good.     Do not touch cat feces or litter boxes.  Also, wash your hands after you handle raw meat, and fully cook all meat before you eat it. Wear gloves when you work in the yard or garden, and wash your hands well when you are done. Cat feces, raw or undercooked meat, and contaminated dirt can cause an infection that may harm your baby or lead to a miscarriage.     Avoid things that can make your body too hot and may be harmful to your baby, such as a hot tub or sauna. Or talk with your doctor before doing anything that raises your body temperature. Your doctor can tell you if it's safe.     Avoid chemical fumes, paint fumes, or poisons.     Do not use illegal drugs, marijuana, or alcohol.   Medicines    Review all of your medicines with your doctor. Some of your routine medicines may need to be changed to protect your baby.     Use acetaminophen (Tylenol) to relieve minor problems, such as a mild headache or backache or a mild fever with cold symptoms. Do not use nonsteroidal anti-inflammatory drugs (NSAIDs), such as ibuprofen (Advil, Motrin) or naproxen (Aleve), unless your doctor says it is okay.     Do not take two or more pain medicines at the same time unless the doctor told you to. Many pain medicines have acetaminophen, which is Tylenol. Too much acetaminophen (Tylenol) can be harmful.     Take your medicines exactly as prescribed. Call your doctor if you think you are having a problem with your medicine.   To manage morning sickness    If you feel sick when you first wake up, try eating a small snack (such as crackers) before you get out of bed. Allow some time to digest the snack, and then get out of bed slowly.     Do not skip meals or go for long periods without eating. An empty stomach can make nausea worse.     Eat small, frequent meals instead of three large meals each day.     Drink plenty of fluids.     Eat foods that are high in protein but low in fat.     If you are taking iron supplements, ask your doctor if they are necessary. Iron can make  "nausea worse.     Avoid any smells, such as coffee, that make you feel sick.     Get lots of rest. Morning sickness may be worse when you are tired.   Follow-up care is a key part of your treatment and safety. Be sure to make and go to all appointments, and call your doctor if you are having problems. It's also a good idea to know your test results and keep a list of the medicines you take.  Where can you learn more?  Go to https://www.Legal Shine.net/patiented  Enter E868 in the search box to learn more about \"Learning About Pregnancy.\"  Current as of: November 9, 2022               Content Version: 13.7    5731-4733 [employer].   Care instructions adapted under license by your healthcare professional. If you have questions about a medical condition or this instruction, always ask your healthcare professional. [employer] disclaims any warranty or liability for your use of this information.      Weeks 6 to 10 of Your Pregnancy: Care Instructions  During these weeks of pregnancy, your body goes through many changes. You may start to feel different, both in your body and your emotions. Each pregnancy is different, so there's no \"right\" way to feel. These early weeks are a time to make healthy choices for you and your pregnancy.    Take a daily prenatal vitamin. Choose one with folic acid in it.   Avoid alcohol, tobacco, and drugs (including marijuana). If you need help quitting, talk to your doctor.     Drink plenty of liquids.  Be sure to drink enough water. And limit sodas, other sweetened drinks, and caffeine.     Choose foods that are good sources of calcium, iron, and folate.  You can try dairy products, dark leafy greens, fortified orange juice and cereals, almonds, broccoli, dried fruit, and beans.     Avoid foods that may be harmful.  Don't eat raw meat, deli meat, raw seafood, or raw eggs. Avoid soft cheese and unpasteurized dairy, like Brie and blue cheese. And don't eat fish that " "contains a lot of mercury, like shark and swordfish.     Don't touch lavern litter or cat poop.  They can cause an infection that could be harmful during pregnancy.     Avoid things that can make your body too hot.  For example, avoid hot tubs and saunas.     Soothe morning sickness.  Try eating 5 or 6 small meals a day, getting some fresh air, or using jacob to control symptoms.     Ask your doctor about flu and COVID-19 shots.  Getting them can help protect against infection.   Follow-up care is a key part of your treatment and safety. Be sure to make and go to all appointments, and call your doctor if you are having problems. It's also a good idea to know your test results and keep a list of the medicines you take.  Where can you learn more?  Go to https://www.Pixspan.iSpecimen/patiented  Enter G112 in the search box to learn more about \"Weeks 6 to 10 of Your Pregnancy: Care Instructions.\"  Current as of: November 9, 2022               Content Version: 13.7 2006-2023 [employer].   Care instructions adapted under license by your healthcare professional. If you have questions about a medical condition or this instruction, always ask your healthcare professional. [employer] disclaims any warranty or liability for your use of this information.         Managing Morning Sickness (01:55)  Your health professional recommends that you watch this short online health video.  Learn tips for dealing with morning sickness, no matter what time of day you have it.  Purpose:  Gives tips for managing morning sickness, including eating small low-fat meals and avoiding caffeine and spicy food.  Goal:  The user will learn tips for dealing with morning sickness during pregnancy.     How to watch the video    Scan the QR code   OR Visit the website    [email or web address]   Current as of: November 9, 2022               Content Version: 13.7 2006-2023 [employer].   Care instructions " adapted under license by your healthcare professional. If you have questions about a medical condition or this instruction, always ask your healthcare professional. [employer], [employer] disclaims any warranty or liability for your use of this information.      Pregnancy and Heartburn: Care Instructions  Overview     Heartburn is a common problem during pregnancy.  Heartburn happens when stomach acid backs up into the tube that carries food to the stomach. This tube is called the esophagus. Early in pregnancy, heartburn is caused by hormone changes that slow down digestion. Later on, it's also caused by the large uterus pushing up on the stomach.  Even though you can't fix the cause, there are things you can do to get relief. Treating heartburn during pregnancy focuses first on making lifestyle changes, like changing what and how you eat, and on taking medicines.  Heartburn usually improves or goes away after childbirth.  Follow-up care is a key part of your treatment and safety. Be sure to make and go to all appointments, and call your doctor if you are having problems. It's also a good idea to know your test results and keep a list of the medicines you take.  How can you care for yourself at home?  Eat small, frequent meals.  Avoid foods that make your symptoms worse, such as chocolate, peppermint, and spicy foods. Avoid drinks with caffeine, such as coffee, tea, and sodas.  Avoid bending over or lying down after meals.  Take a short walk after you eat.  If heartburn is a problem at night, do not eat for 2 hours before bedtime.  Take antacids like Mylanta, Maalox, Rolaids, or Tums. Do not take antacids that have sodium bicarbonate, magnesium trisilicate, or aspirin. Be careful when you take over-the-counter antacid medicines. Many of these medicines have aspirin in them. While you are pregnant, do not take aspirin or medicines that contain aspirin unless your doctor says it is okay.  If you're not getting  "relief, talk to your doctor. You may be able to take a stronger acid-reducing medicine.  When should you call for help?   Call your doctor now or seek immediate medical care if:    You have new or worse belly pain.     You are vomiting.   Watch closely for changes in your health, and be sure to contact your doctor if:    You have new or worse symptoms of reflux.     You are losing weight.     You have trouble or pain swallowing.     You do not get better as expected.   Where can you learn more?  Go to https://www.RJMetrics.net/patiented  Enter U946 in the search box to learn more about \"Pregnancy and Heartburn: Care Instructions.\"  Current as of: November 9, 2022               Content Version: 13.7 2006-2023 [employer].   Care instructions adapted under license by your healthcare professional. If you have questions about a medical condition or this instruction, always ask your healthcare professional. [employer] disclaims any warranty or liability for your use of this information.      Constipation: Care Instructions  Overview     Constipation means that you have a hard time passing stools (bowel movements). People pass stools from 3 times a day to once every 3 days. What is normal for you may be different. Constipation may occur with pain in the rectum and cramping. The pain may get worse when you try to pass stools. Sometimes there are small amounts of bright red blood on toilet paper or the surface of stools. This is because of enlarged veins near the rectum (hemorrhoids).  A few changes in your diet and lifestyle may help you avoid ongoing constipation. Your doctor may also prescribe medicine to help loosen your stool.  Some medicines can cause constipation. These include pain medicines and antidepressants. Tell your doctor about all the medicines you take. Your doctor may want to make a medicine change to ease your symptoms.  Follow-up care is a key part of your treatment and " "safety. Be sure to make and go to all appointments, and call your doctor if you are having problems. It's also a good idea to know your test results and keep a list of the medicines you take.  How can you care for yourself at home?  Drink plenty of fluids. If you have kidney, heart, or liver disease and have to limit fluids, talk with your doctor before you increase the amount of fluids you drink.  Include high-fiber foods in your diet each day. These include fruits, vegetables, beans, and whole grains.  Get at least 30 minutes of exercise on most days of the week. Walking is a good choice. You also may want to do other activities, such as running, swimming, cycling, or playing tennis or team sports.  Take a fiber supplement, such as Citrucel or Metamucil, every day. Read and follow all instructions on the label.  Schedule time each day for a bowel movement. A daily routine may help. Take your time having a bowel movement, but don't sit for more than 10 minutes at a time. And don't strain too much.  Support your feet with a small step stool when you sit on the toilet. This helps flex your hips and places your pelvis in a squatting position.  Your doctor may recommend an over-the-counter laxative to relieve your constipation. Examples are Milk of Magnesia and MiraLax. Read and follow all instructions on the label. Do not use laxatives on a long-term basis.  When should you call for help?   Call your doctor now or seek immediate medical care if:    You have new or worse belly pain.     You have new or worse nausea or vomiting.     You have blood in your stools.   Watch closely for changes in your health, and be sure to contact your doctor if:    Your constipation is getting worse.     You do not get better as expected.   Where can you learn more?  Go to https://www.healthwise.net/patiented  Enter P343 in the search box to learn more about \"Constipation: Care Instructions.\"  Current as of: March 22, " "2023               Content Version: 13.7 2006-2023 [employer].   Care instructions adapted under license by your healthcare professional. If you have questions about a medical condition or this instruction, always ask your healthcare professional. [employer] disclaims any warranty or liability for your use of this information.      Learning About High-Iron Foods  What foods are high in iron?     The foods you eat contain nutrients, such as vitamins and minerals. Iron is a nutrient. Your body needs the right amount to stay healthy and work as it should. You can use the list below to help you make choices about which foods to eat.  Here are some foods that contain iron. They have 1 to 2 milligrams of iron per serving.  Fruits  Figs (dried), 5 figs  Vegetables  Asparagus (canned), 6 tang  Aimee, beet, Swiss chard, or turnip greens, 1 cup  Dried peas, cooked,   cup  Seaweed, spirulina (dried),   cup  Spinach, (cooked)   cup or (raw) 1 cup  Grains  Cereals, fortified with iron, 1 cup  Grits (instant, cooked), fortified with iron,   cup  Meats and other protein foods  Beans (kidney, lima, navy, white), canned or cooked,   cup  Beef or lamb, 3 oz  Chicken giblets, 3 oz  Chickpeas (garbanzo beans),   cup  Liver of beef, lamb, or pork, 3 oz  Oysters (cooked), 3 oz  Sardines (canned), 3 oz  Soybeans (boiled),   cup  Tofu (firm),   cup  Work with your doctor to find out how much of this nutrient you need. Depending on your health, you may need more or less of it in your diet.  Where can you learn more?  Go to https://www.healthIceRocket.net/patiented  Enter R005 in the search box to learn more about \"Learning About High-Iron Foods.\"  Current as of: March 1, 2023               Content Version: 13.7 2006-2023 [employer].   Care instructions adapted under license by your healthcare professional. If you have questions about a medical condition or this instruction, always ask your " "healthcare professional. [employer], [facility] disclaims any warranty or liability for your use of this information.      Rh Antibodies Screening During Pregnancy: About This Test  What is it?     The Rh antibodies screening test is a blood test. It checks your blood for Rh antibodies. If you have Rh-negative blood and have been exposed to Rh-positive blood, your immune system may make antibodies to attack the Rh-positive blood. When a pregnant woman has these antibodies, it is called Rh sensitization.  Why is this test done?  The Rh antibodies screening test is done during pregnancy to find out if your baby is at risk for Rh disease. This can happen if you have Rh-negative blood and your baby has Rh-positive blood. If your Rh-negative blood mixes with Rh-positive blood, your immune system will make antibodies to attack the Rh-positive blood.  During pregnancy, these antibodies could attach to the baby's red blood cells. This can cause your baby to have serious health problems. The results of this test will help your doctor know how to best care for you and your baby during your pregnancy.  How do you prepare for the test?  In general, there's nothing you have to do before this test, unless your doctor tells you to.  How is the test done?  A health professional uses a needle to take a blood sample, usually from the arm.  What happens after the test?  You will probably be able to go home right away. It depends on the reason for the test.  You can go back to your usual activities right away.  Follow-up care is a key part of your treatment and safety. Be sure to make and go to all appointments, and call your doctor if you are having problems. It's also a good idea to keep a list of the medicines you take. Ask your doctor when you can expect to have your test results.  Where can you learn more?  Go to https://www.WhoJam.net/patiented  Enter P722 in the search box to learn more about \"Rh Antibodies Screening " "During Pregnancy: About This Test.\"  Current as of: 2022               Content Version: 13.7    8762-9909 [employer].   Care instructions adapted under license by your healthcare professional. If you have questions about a medical condition or this instruction, always ask your healthcare professional. [employer] disclaims any warranty or liability for your use of this information.      Learning About Preventing Rh Disease  What is Rh disease?     Rh disease can be a serious problem in pregnancy. It happens when substances called antibodies in the mother's blood cause red blood cells in her baby's blood to be destroyed. This can occur when the blood types of a mother and her baby do not match.  All blood has an Rh factor. This is what makes a blood type positive or negative. When you are Rh-negative, your baby may be Rh-negative or Rh-positive. If your baby has Rh-positive blood and it mixes with yours, your body will make antibodies. This is called Rh sensitization.  Most of the time, this is not a problem in a first pregnancy. But in future pregnancies, it could cause Rh disease.  A  with Rh disease has mild anemia and may have jaundice. In severe cases, anemia, jaundice, and swelling can be very dangerous or fatal. Some babies need to be delivered early. Some need special care in the NICU. A very sick baby will need a blood transfusion before or after birth.  Fortunately, Rh sensitization is usually easy to prevent.  That's why it's important to get your Rh status checked in your first trimester. It doesn't cause any warning signs. A blood test is the only way to know if you are Rh-sensitive or are at risk for it.  How can you prevent Rh disease?  If you are Rh-negative, your doctor gives you an Rh immune globulin shot (such as RhoGAM). It helps prevent your body from making the antibodies that attack your baby's red blood cells.  Timing is important. You need the " "shot at certain times during your pregnancy. And you need one anytime there is a chance that your baby's blood might mix with yours. That can happen with certain prenatal tests or when you have pregnancy bleeding, such as:  Right after any pregnancy loss, amniocentesis, or CVS testing.  After turning of a breech baby.  Before and maybe after childbirth. Your doctor gives you a shot around week 28. If your  is Rh-positive, you will have another shot.  Follow-up care is a key part of your treatment and safety. Be sure to make and go to all appointments, and call your doctor if you are having problems. It's also a good idea to know your test results and keep a list of the medicines you take.  Where can you learn more?  Go to https://[email or web address].[employer] [email or web address]/patiented  Enter W177 in the search box to learn more about \"Learning About Preventing Rh Disease.\"  Current as of: 2022               Content Version: 13.7    5822-5098 [employer].   Care instructions adapted under license by your healthcare professional. If you have questions about a medical condition or this instruction, always ask your healthcare professional. [employer] disclaims any warranty or liability for your use of this information.      Learning About Rh Immunoglobulin Shots  Introduction     An Rh immunoglobulin shot is given to pregnant women who have Rh-negative blood.  You may have Rh-negative blood, and your baby may have Rh-positive blood. If the two types of blood mix, your body will make antibodies. This is called Rh sensitization. Most of the time, this is not a problem the first time you're pregnant. But it could cause problems in future pregnancies.  This shot keeps your body from making the antibodies. You get the shot around 28 weeks of pregnancy. After the birth, your baby's blood is tested. If the blood is Rh positive, you will get another shot. You may also get the shot if you have vaginal bleeding " "while you are pregnant or if you have a miscarriage. These shots protect future pregnancies.  Women with Rh negative blood will need this shot each time they get pregnant.  Example  Rh immunoglobulin (HypRho-D, MICRhoGAM, and RhoGAM)  Possible side effects  Rare side effects may include:  Some mild pain where you got the shot.  A slight fever.  An allergic reaction.  You may have other side effects not listed here. Check the information that comes with your medicine.  What to know about taking this medicine  You may need more than one shot. You may need the shot again:  After amniocentesis, fetal blood sampling, or chorionic villus sampling tests.  If you have bleeding in your second or third trimester.  After turning of a breech baby.  After an injury to the belly while you are pregnant.  After a miscarriage or an .  Before or right after treatment for an ectopic or a partial molar pregnancy.  Tell your doctor if you have any allergies or have had a bad response to medicines in the past.  If you get this shot within 3 months of getting a live-virus vaccine, the vaccine may not work. Your doctor will tell you if you need more vaccine.  Check with your doctor or pharmacist before you use any other medicines. This includes over-the-counter medicines. Make sure your doctor knows all of the medicines, vitamins, herbs, and supplements you take. Taking some medicines at the same time can cause problems.  Where can you learn more?  Go to https://www.Genomera.net/patiented  Enter V615 in the search box to learn more about \"Learning About Rh Immunoglobulin Shots.\"  Current as of: 2022               Content Version: 13.7    2106-4331 [employer].   Care instructions adapted under license by your healthcare professional. If you have questions about a medical condition or this instruction, always ask your healthcare professional. [employer] disclaims any warranty or liability for " your use of this information.      Rubella (Austrian Measles): Care Instructions  Overview  Rubella, also called Austrian measles or 3-day measles, is a disease caused by a virus. It spreads by coughs, sneezes, and close contact. Rubella usually is mild and does not cause long-term problems. But if you are pregnant and get it, you can give the disease to your unborn baby. This can cause serious birth defects.  While you have rubella, you may get a rash and a mild fever, and the lymph glands in your neck may swell. Older children often have a fever, eye pain, a sore throat, and body aches. You can relieve most symptoms with care at home. Avoid being around others, especially pregnant people, until your rash has been gone for at least 4 days. People who have not had this disease before or have not had the vaccine have the greatest chance of getting the virus.  Follow-up care is a key part of your treatment and safety. Be sure to make and go to all appointments, and call your doctor if you are having problems. It's also a good idea to know your test results and keep a list of the medicines you take.  How can you care for yourself at home?  Drink plenty of fluids. If you have kidney, heart, or liver disease and have to limit fluids, talk with your doctor before you increase the amount of fluids you drink.  Get plenty of rest to help your body heal.  Take an over-the-counter pain medicine, such as acetaminophen (Tylenol), ibuprofen (Advil, Motrin), or naproxen (Aleve), to reduce fever and discomfort. Read and follow all instructions on the label. Do not give aspirin to anyone younger than 20. It has been linked to Reye syndrome, a serious illness.  Do not take two or more pain medicines at the same time unless the doctor told you to. Many pain medicines have acetaminophen, which is Tylenol. Too much acetaminophen (Tylenol) can be harmful.  Try not to scratch the rash. Put cold, wet cloths on the rash to reduce itching.  Do  "not smoke. Smoking can make your symptoms worse. If you need help quitting, talk to your doctor about stop-smoking programs and medicines. These can increase your chances of quitting for good.  Avoid contact with people who have never had rubella and who have not been immunized.  When should you call for help?   Call your doctor now or seek immediate medical care if:    You have a fever with a stiff neck or a severe headache.     You are sensitive to light or feel very sleepy or confused.   Watch closely for changes in your health, and be sure to contact your doctor if:    You do not get better as expected.   Where can you learn more?  Go to https://www.[employer].net/patiented  Enter B812 in the search box to learn more about \"Rubella ([place] Measles): Care Instructions.\"  Current as of: 2022               Content Version: 13.7    1234-7574 [employer].   Care instructions adapted under license by your healthcare professional. If you have questions about a medical condition or this instruction, always ask your healthcare professional. [employer] disclaims any warranty or liability for your use of this information.      Gonorrhea and Chlamydia: About These Tests  What is it?  These tests use a sample of urine or other body fluid to look for the bacteria that cause these sexually transmitted infections (STIs). The fluid sample can come from the cervix, vagina, rectum, throat, or eyes.  Why is this test done?  These tests may be done to:  Find out if symptoms are caused by gonorrhea or chlamydia.  Check people who are at high risk of being infected with gonorrhea or chlamydia.  Retest people several months after they have been treated for gonorrhea or chlamydia.  Check for infection in your  if you had a gonorrhea or chlamydia infection at the time of delivery.  How can you prepare for the test?  If you are going to have a urine test, do not urinate for at least 1 hour " "before the test.  If you think you may have chlamydia or gonorrhea, don't have sexual intercourse until you get your test results. And you may want to have tests for other STIs, such as HIV.  How is the test done?  For a direct sample, a swab is used to collect body fluid from the cervix, vagina, rectum, throat, or eyes. Your doctor may collect the sample. Or you may be given instructions on how to collect your own sample.  For a urine sample, you will collect the urine that comes out when you first start to urinate. Don't wipe the genital area clean before you urinate.  How long does the test take?  The test will take a few minutes.  What happens after the test?  You will be able to go home right away.  You can go back to your usual activities right away.  If you do have an infection, don't have sexual intercourse for 7 days after you start treatment. And your sex partner(s) should also be treated.  Follow-up care is a key part of your treatment and safety. Be sure to make and go to all appointments, and call your doctor if you are having problems. It's also a good idea to keep a list of the medicines you take. Ask your doctor when you can expect to have your test results.  Where can you learn more?  Go to [email or web address]://[email or web address]/patiented  Enter K976 in the search box to learn more about \"Gonorrhea and Chlamydia: About These Tests.\"  Current as of: August 2, 2022               Content Version: 13.7    8028-5640 [employer].   Care instructions adapted under license by your healthcare professional. If you have questions about a medical condition or this instruction, always ask your healthcare professional. [employer] disclaims any warranty or liability for your use of this information.      Trichomoniasis: About This Test  What is it?     This test uses a sample of urine or other body fluid to look for the tiny parasite that causes trichomoniasis (also called trich). The fluid sample " can come from the vagina, cervix, or urethra. Your doctor may choose to use one or more of many available tests.  Why is it done?  A trich test may be done to:  Find out if symptoms are caused by trich.  Check people who are at high risk for being infected with trich.  Check after treatment to make sure that the infection is gone.  How do you prepare for the test?  If you are going to have a urine test, do not urinate for at least 1 hour before the test.  How is the test done?  For a direct sample, a swab is used to collect body fluid from the cervix, vagina, or urethra. Your doctor may collect the sample. Or you may be given instructions on how to collect your own sample.  For a urine sample, you will collect the urine that comes out when you first start to urinate. Don't wipe the area clean before you urinate.  How long does the test take?  It will take a few minutes to collect a sample.  What happens after the test?  You can go home right away.  You can go back to your usual activities right away.  You may get the test results the same day or several days later. It depends on the test used.  If you do have an infection, don't have sexual intercourse for 7 days after you start treatment. Your sex partner(s) should also be treated.  Follow-up care is a key part of your treatment and safety. Be sure to make and go to all appointments, and call your doctor if you are having problems. Ask your doctor when you can expect to have your test results.  Current as of: August 2, 2022               Content Version: 13.7    4385-7541 [employer].   Care instructions adapted under license by your healthcare professional. If you have questions about a medical condition or this instruction, always ask your healthcare professional. [employer] disclaims any warranty or liability for your use of this information.      HIV Testing: Care Instructions  Overview     You can get tested for the human  "immunodeficiency virus (HIV). This test checks for HIV antibodies and antigens in your blood. If they are found, the test is positive.  If HIV antibodies or antigens are not found, you may need a repeat test to be sure the results are correct. Or your doctor may want to do a different test.  Follow-up care is a key part of your treatment and safety. Be sure to make and go to all appointments, and call your doctor if you are having problems. It's also a good idea to know your test results and keep a list of the medicines you take.  What do the results mean?  Normal result  A normal result means that no HIV antibodies or antigens were found in your blood. And if you had a test that checked for HIV RNA or DNA, none was found. Normal results are called negative.  You may need more testing to be sure the test results are correct.  Uncertain result  Test results don't clearly show whether you have an HIV infection. This is usually called an indeterminate result. This may happen before HIV antibodies or antigens develop. Or it may happen when some other type of antibody or antigen interferes with the results. If this occurs, you will probably have another test right away.  Abnormal result  An abnormal result means that you have HIV antibodies or antigens in your blood. These results are called positive.  A positive test will be confirmed by another type of test. This is because some tests can cause false-positive results. No one is considered HIV-positive until the result is confirmed by a test that shows HIV RNA or DNA in the person's blood.  If your test result is positive, your doctor will talk to you about starting treatment.  Where can you learn more?  Go to https://www.[employer].net/patiented  Enter T792 in the search box to learn more about \"HIV Testing: Care Instructions.\"  Current as of: October 31, 2022               Content Version: 13.7    5449-6819 [employer].   Care instructions adapted under " license by your healthcare professional. If you have questions about a medical condition or this instruction, always ask your healthcare professional. [employer] disclaims any warranty or liability for your use of this information.      Hepatitis C Virus Tests: About These Tests  What are they?     Hepatitis C virus tests are blood tests that check for substances in the blood that show whether you have hepatitis C now or had it in the past. The tests can also tell you what type of hepatitis C you have and how severe the disease is. This can help your doctor with treatment.  If the tests show that you have long-term hepatitis C, you need to take steps to prevent spreading the disease.  Why are these tests done?  You may need these tests if:  You have symptoms of hepatitis.  You may have been exposed to the virus. You are more likely to have been exposed to the virus if you inject drugs or are exposed to body fluids (such as if you are a health care worker).  You've had other tests that show you have liver problems.  You are 18 to 79 years old.  You have an HIV infection.  The tests also are done to help your doctor decide about your treatment and see how well it works.  How do you prepare for the test?  In general, there's nothing you have to do before this test, unless your doctor tells you to.  How is the test done?  A health professional uses a needle to take a blood sample, usually from the arm.  What happens after these tests?  You will probably be able to go home right away.  You can go back to your usual activities right away.  Follow-up care is a key part of your treatment and safety. Be sure to make and go to all appointments, and call your doctor if you are having problems. It's also a good idea to keep a list of the medicines you take. Ask your doctor when you can expect to have your test results.  Where can you learn more?  Go to https://www.[employer].net/patiented  Enter W551 in the search  "box to learn more about \"Hepatitis C Virus Tests: About These Tests.\"  Current as of: October 31, 2022               Content Version: 13.7    8264-0108 [employer].   Care instructions adapted under license by your healthcare professional. If you have questions about a medical condition or this instruction, always ask your healthcare professional. [employer] disclaims any warranty or liability for your use of this information.      Learning About Fetal Ultrasound Results  What is a fetal ultrasound?     Fetal ultrasound is a test that lets your doctor see an image of your baby. Your doctor learns information about your baby from this picture. You may find out, for example, if you are having a boy or a girl. But the main reason you have this test is to get information about your baby's health.  (You may hear your baby called a fetus. This is a common medical term for a baby that's growing in the mother's uterus.)  What kind of information can you learn from this test?  The findings of an ultrasound fall into two categories, normal and abnormal.  Normal  The fetus is the right size for its age.  The placenta is the expected size and does not cover the cervix.  There is enough amniotic fluid in the uterus.  No birth defects can be seen.  Abnormal  The fetus is small or large for its age.  The placenta covers the cervix.  There is too much or too little amniotic fluid in the uterus.  The fetus may have a birth defect.  What does an abnormal result mean?  Abnormal seems to imply that something is wrong with your baby. But what it means is that the test has shown something the doctor wants to take a closer look at.  And that's what happens next. Your doctor will talk to you about what further test or tests you may need.  What do the results mean?  Some of the things your doctor may see on an abnormal ultrasound include:  Echogenic bowel.  The bowel looks very bright on the screen. This could " mean that there's blood in the bowel. Or it could mean that something is blocking the small bowel.  Increased nuchal translucency.  The ultrasound measures the thickness at the back of the baby's neck. An increase in thickness is sometimes an early sign of Down syndrome.  Increased or decreased amniotic fluid.  The doctor will look for a reason for the level of amniotic fluid and will watch the pregnancy closely as it progresses.  Large ventricles.  Ventricles in the brain look larger than they should. Your doctor may take a closer look at the brain.  Renal pyelectasis/hydronephrosis.  The ultrasound measures the fluid around the kidney. If there is more fluid than expected, there is a chance of urinary tract or kidney problems.  Short long bones.  The ultrasound measures certain arm and leg bones. A long bone (humerus or femur) that is shorter than average could be a sign of Down syndrome.  Subchorionic hemorrhage.  An ultrasound can show bleeding under one of the membranes that surrounds the fetus. Some women don't have symptoms of bleeding. The ultrasound can find this problem when women are not bleeding from their vagina. Women who have this condition have a slightly higher chance of miscarriage.  What do you do now?  Take a deep breath, and let it out. Keep in mind that an abnormal finding on an ultrasound, after it's coupled with more information, may:  Turn out to be nothing.  Turn out to be something mild that won't affect the baby.  Turn out to be something more serious. But if this happens, early diagnosis helps you and your doctor plan treatment options sooner rather than later.  Your medical team is there for you. So are your family and friends. Ask questions, and get the help and support you need.  Follow-up care is a key part of your treatment and safety. Be sure to make and go to all appointments, and call your doctor if you are having problems. It's also a good idea to know your test results and keep  "a list of the medicines you take.  Where can you learn more?  Go to https://[email or web address]/patiented  Enter K451 in the search box to learn more about \"Learning About Fetal Ultrasound Results.\"  Current as of: November 9, 2022               Content Version: 13.7    8004-3712 [employer].   Care instructions adapted under license by your healthcare professional. If you have questions about a medical condition or this instruction, always ask your healthcare professional. [employer] disclaims any warranty or liability for your use of this information.      Learning About Prenatal Visits  Your Care Instructions     Regular prenatal visits are very important during any pregnancy. These quick office visits may seem simple and routine. But they can help you and your baby stay healthy. Your doctor is watching for problems that can only be found by regularly checking you and your baby. The visits also give you and your doctor time to build a good relationship.  Many women have prenatal visits every 4 to 6 weeks until week 28 of pregnancy. Then the visits become more frequent. This is often every 2 to 3 weeks through week 36 of pregnancy. In the final month of pregnancy, you likely will see your doctor every week. You may have a different schedule if you have a medical problem or are a teen.  At different times in your pregnancy, you will have exams and tests. Some are routine. Others are done only when there is a chance of a problem. Everything healthy you do for your body helps your growing baby. Rest when you need it. Eat well, drink plenty of water, and exercise regularly.  What happens during a prenatal visit?  You will have blood pressure checks, along with urine tests. You also may have blood tests. If you need to go to the bathroom while waiting for the doctor, tell the nurse. He or she will give you a sample cup so your urine can be tested.  You will be weighed and have your belly " measured.  Your doctor may listen to your baby's heartbeat with a special stethoscope.  In your second trimester, your doctor will check your blood sugar (glucose tolerance test) for diabetes that can occur during pregnancy. This is gestational diabetes, which can harm your baby.  You will have tests to check for infections that could harm your . These include group B streptococcus and hepatitis B.  Your doctor may do ultrasounds to check for problems. This also checks your baby's position. An ultrasound uses sound waves to produce a picture of your baby.  You may have other tests at any time during your pregnancy.  Use your visits to discuss with your doctor any concerns you have.  How can you care for yourself at home?  Get plenty of rest.  Exercise every day, if your doctor says it is okay. If you have not exercised in the past, start out slowly. Take many short walks each day.  Eat a balanced diet. Make sure your diet includes plenty of beans, peas, and leafy green vegetables.  Drink plenty of fluids. Cut down on drinks with caffeine, such as coffee, tea, and cola. If you have kidney, heart, or liver disease and have to limit fluids, talk with your doctor before you increase the amount of fluids you drink.  Avoid chemical fumes, paint fumes, and poisons. Do not use alcohol, marijuana, or illegal drugs. Do not smoke, vape, or use tobacco. If you need help quitting, talk to your doctor about stop-smoking programs and medicines. These can increase your chances of quitting for good.  Review all of your medicines with your doctor. Some of your routine medicines may need to be changed to protect your baby. Do not stop or start taking any medicines without talking to your doctor first.  Follow-up care is a key part of your treatment and safety. Be sure to make and go to all appointments, and call your doctor if you are having problems. It's also a good idea to know your test results and keep a list of the  "medicines you take.  Where can you learn more?  Go to https://www.[employer]iaCLIX.net/patiented  Enter J502 in the search box to learn more about \"Learning About Prenatal Visits.\"  Current as of: November 9, 2022               Content Version: 13.7    1313-8292 [employer].   Care instructions adapted under license by your healthcare professional. If you have questions about a medical condition or this instruction, always ask your healthcare professional. [employer] disclaims any warranty or liability for your use of this information.      Domestic Abuse: Care Instructions  Overview     If you want to save this information but don't think it is safe to take it home, see if a trusted friend can keep it for you. Plan ahead. Know who you can call for help, and memorize the phone number.   Be careful online too. Your online activity may be seen by others. Do not use your personal computer or device to read about this topic. Use a safe computer such as one at work, a friend's house, or a library.    Domestic abuse is different from an argument now and then. It is a pattern of abuse that one person uses to control another person's behavior. It may start with threats and name-calling. Then, it may lead to more serious acts, like pushing and slapping. The abuse also may occur in other areas. For example, the abuser may withhold money or spend a partner's money without their knowledge.  Abuse can cause serious harm. You are more likely to have a long-term health problem from the injuries and stress of living in a violent relationship. People who are sexually abused by their partners have more sexually transmitted infections and unwanted pregnancies. Anyone can be abused in relationships. Anyone who is abused also faces emotional pain.  If you are pregnant, abuse can cause problems such as poor weight gain, infections, and bleeding. Abuse during this time may increase your baby's risk of low birth " "weight, premature birth, and death.  Follow-up care is a key part of your treatment and safety. Be sure to make and go to all appointments, and call your doctor if you are having problems. It's also a good idea to know your test results and keep a list of the medicines you take.  How can you care for yourself at home?  If you do not have a safe place to stay, discuss this with your doctor before you leave.  Have a plan for where to go, how to leave your house, and where to stay in case of an emergency. Do not tell your partner about your plan. Contact:  The National Domestic Violence Hotline toll-free at 1-111.847.7876. They can help you find resources in your area.  Your local police department, hospital, or clinic for information about shelters and safe homes near you.  Talk to a trusted friend or neighbor or a [employer] counselor. Do not feel that you have to hide what happened.  Teach your children how to call for help in an emergency.  Be alert to warning signs, such as threats, heavy alcohol use, or drug use. This can help you avoid danger.  If you can, make sure that there are no guns or other weapons in the house.  When should you call for help?   Call 911 anytime you think you may need emergency care. For example, call if:    You or someone else has just been abused.     You think you or someone else is in danger of being abused.   Watch closely for changes in your health, and be sure to contact your doctor if you have any problems.  Where can you learn more?  Go to https://www.[employer].net/patiented  Enter G282 in the search box to learn more about \"Domestic Abuse: Care Instructions.\"  Current as of: February 27, 2023               Content Version: 13.7    4045-4686 [employer].   Care instructions adapted under license by your healthcare professional. If you have questions about a medical condition or this instruction, always ask your healthcare professional. [employer] " disclaims any warranty or liability for your use of this information.      Domestic Violence Safety Instructions: Care Instructions  Overview     If you want to save this information but don't think it is safe to take it home, see if a trusted friend can keep it for you. Plan ahead. Know who you can call for help, and memorize the phone number.   Be careful online too. Your online activity may be seen by others. Do not use your personal computer or device to read about this topic. Use a safe computer such as one at work, a friend's house, or a library.    When you are abused by a spouse or partner, you can take actions to protect yourself and your children.  You can increase your safety whether you decide to stay with your spouse or partner or you decide to leave. You can also prepare an action plan and kit ahead of time. This will allow you to leave quickly when you decide to. Remember, you cannot stop your partner's abuse, but you can find help for you and your children. No one deserves to be abused.  Follow-up care is a key part of your treatment and safety. Be sure to make and go to all appointments, and call your doctor if you are having problems. It's also a good idea to know your test results and keep a list of the medicines you take.  How can you care for yourself at home?  Make a plan for your safety   If you decide to stay with your abusive spouse or partner, you can do the following to increase your safety:  Decide what works best to keep you safe in an emergency.  Decide who you can call to help you in an emergency.  Decide if you will call the police if you get hurt again. If you can, agree on a signal with your children or neighbor to call the police for you if you need help. You can flash lights or hang something out of a window.  Choose a place to go for a short time if you need to leave home. Memorize the address and phone number.  Learn escape routes out of your home in case you need to leave in a  hurry. Teach your children different ways to get out of the house quickly if they need to.  Take objects that can be used as weapons (guns, knives, hammers) and hide them or lock them up.  Learn the number of a domestic violence shelter. Talk to the people there about how they can help.  Find out about other community resources that can help you.  Take pictures of bruises or other injuries if you can. You can also take pictures of things your abuser has broken.  Teach your children that violence is never okay. Tell them that they do not deserve to be hurt.  Pack a bag   Prepare a kit with things you will need if you leave the house suddenly. You can try to hide this in your house, or you can leave it with a friend or relative you can trust. You should include the following items in the kit:  A set of keys to your house and car.  Emergency phone numbers and addresses. You might also want to have a map and a small flashlight in case you need to leave in the night.  Money such as cash or checks. You can also ask a trusted friend or family member to hold money for you.  Copies of legal documents such as house and car titles or rent receipts, birth certificates, Social Security card, voter registration, marriage and 's licenses, and your children's health records.  Personal items you would need for a few days, such as clothes, a toothbrush, toothpaste, and any medicines you or your children need.  A favorite toy or book for your child or children.  Diapers and bottles, if you have very young children.  Pictures that show signs of abuse and violence. You may also add pictures of your abuser.  If you leave   If you decide to leave, you can take the following steps:  Go to the emergency room at a hospital if you have been hurt.  Ask the police to be with you as you leave. They can protect you as you leave the house.  If you decide to leave secretly, remember that activities can be tracked. Your abuser may still have  "access to your cell phone, email, and credit cards. It may be possible for these to be traced. Always be aware of your surroundings.  Take the kit you have prepared. If this is an emergency, do not worry about gathering up anything. Just leave--your safety is most important.  If your abuser moves out, change the locks on the doors. If you have a security system, change the access code.  When should you call for help?   Call 911 anytime you think you may need emergency care. For example, call if:    You or someone else has just been abused.     You think you or someone else is in danger of being abused.   Watch closely for changes in your health, and be sure to contact your doctor if you have any problems.  Where can you learn more?  Go to https://www.[employer].net/patiented  Enter A752 in the search box to learn more about \"Domestic Violence Safety Instructions: Care Instructions.\"  Current as of: October 20, 2022               Content Version: 13.7    2310-1876 [employer].   Care instructions adapted under license by your healthcare professional. If you have questions about a medical condition or this instruction, always ask your healthcare professional. [employer] disclaims any warranty or liability for your use of this information.      Learning About Domestic Abuse  What is domestic abuse?  Domestic abuse is threats or violent behavior in a personal relationship. It can happen between past or current partners or spouses. It's also called domestic violence or intimate partner violence.  Domestic abuse can affect people of any ethnic group, race, or Baptism. It can affect teens, adults, or the elderly. And it can happen to people of any sexual identity or social status. But most abuse victims are women.  Abusers use fear, bullying, and threats to control their partners. They control what their partners do, where they go, or who they see. They may act jealous, controlling, or " possessive. These early signs of abuse may happen soon after the start of the relationship. Sometimes it can be hard to notice abuse at first. But after the relationship becomes more serious, the abuse may get worse.  If you are being abused in your relationship, it's important to get help. The abuse is not your fault, and you don't have to face it alone.  Be careful  It may not be safe to take home domestic abuse information like this handout. Some people ask a trusted friend to keep it for them. It's also important to plan ahead and to memorize the phone number of places you can go for help. If you are concerned about your safety, do not use your computer, smartphone, or tablet to read about domestic abuse.   What are the types of domestic abuse?  Abuse can be emotional, physical, or sexual.  Emotional abuse  Emotional abuse is a pattern of threats, insults, or controlling behavior. It includes verbal abuse. It goes beyond healthy disagreements in a relationship. It's a sign of an unhealthy relationship, and it may be against the law.  Do you feel threatened, intimidated, or controlled? Does your partner threaten your children, other family members, or pets?  Does your partner:  Use jokes meant to embarrass or shame you?  Call you names?  Tell you that you are a bad parent or threaten to take away your children?  Threaten to have you or your family members deported?  Control your access to money or other basic needs?  Control what you do, who you see or talk to, or where you go?  Another form of emotional abuse is denying that it is happening. Or the abuser may act like the abuse is no big deal or is your fault.  Sexual abuse  With sexual abuse, abusers may try to convince or force you to have sex. They may force you into sex acts you're not comfortable with. Or they may sexually assault you. Sexual abuse can happen even if you are in a committed relationship.  Physical abuse  Physical abuse means that a partner  hits, kicks, or physically hurts you. Physical abuse that starts with a slap might lead to kicking, shoving, and choking over time. The abuser may also threaten to hurt or kill you.  What problems can domestic abuse lead to?  Domestic abuse can be very dangerous. It can cause serious, repeated injury. It can even lead to death.  All forms of abuse can cause long-term health problems from the stress of a violent relationship. Verbal abuse can lead to sexual and physical abuse.  Abuse causes emotional pain, depression, anxiety, and post-traumatic stress. Sexual abuse can lead to sexually transmitted infections (including HIV/AIDS) and unplanned pregnancy.  Pregnancy can be a very dangerous time for people in abusive relationships. Pregnant people who are abused may have anemia, infections, bleeding, or poor weight gain. Abuse during this time may also increase your baby's risk of low birth weight, premature birth, and death.  It can be hard for some victims of domestic abuse to ask for help or to leave their relationship. You may feel scared, stuck, or not sure what steps to take. But it's important not to ignore abuse. Talking to someone could be the first step to ending the abuse and taking care of your own health and happiness again.  Where can you get help?  Talk to a trusted friend. Find a local advocacy group, or talk to your doctor about the abuse.  Contact the National Domestic Violence Hotline at 0-410-795-VKPL (1-471.499.2478) for more safety tips. They can guide you to groups in your area that can help. Or go to the National Coalition Against Domestic Violence website at www.thehotline.org to learn more.  Domestic violence groups or a counselor in your area can help you make a safety plan for yourself and your children.  When to call for help  Call 911 anytime you think you may need emergency care. For example, call if:  You think that you or someone you know is in danger of being abused.  You have been  "hurt and can't have someone safely take you to emergency care.  You have just been abused.  A family member has just been abused.  Where can you learn more?  Go to https://www.[employer].net/patiented  Enter S665 in the search box to learn more about \"Learning About Domestic Abuse.\"  Current as of: February 27, 2023               Content Version: 13.7    7234-1884 [facility].   Care instructions adapted under license by your healthcare professional. If you have questions about a medical condition or this instruction, always ask your healthcare professional. [facility] disclaims any warranty or liability for your use of this information.      Vaginal Bleeding During Pregnancy: Care Instructions  Overview     It's common to have some vaginal spotting when you are pregnant. In some cases, the bleeding isn't serious. And there aren't any more problems with the pregnancy.  But sometimes bleeding is a sign of a more serious problem. This is more common if the bleeding is heavy or painful. Examples of more serious problems include miscarriage, an ectopic pregnancy, and a problem with the placenta.  You may have to see your doctor again to be sure everything is okay. You may also need more tests to find the cause of the bleeding.  Home treatment may be all you need. But it depends on what is causing the bleeding. Be sure to tell your doctor if you have any new symptoms or if your symptoms get worse.  The doctor has checked you carefully, but problems can develop later. If you notice any problems or new symptoms, get medical treatment right away.  Follow-up care is a key part of your treatment and safety. Be sure to make and go to all appointments, and call your doctor if you are having problems. It's also a good idea to know your test results and keep a list of the medicines you take.  How can you care for yourself at home?  If your doctor prescribed medicines, take them exactly as directed. Call " "your doctor if you think you are having a problem with your medicine.  Do not have vaginal sex until your doctor says it's okay.  Do not put anything in your vagina until your doctor says it's okay.  Ask your doctor about other activities you can or can't do.  Get a lot of rest. Being pregnant can make you tired.  Do not use nonsteroidal anti-inflammatory drugs (NSAIDs), such as ibuprofen (Advil, Motrin), naproxen (Aleve), or aspirin, unless your doctor says it is okay.  When should you call for help?   Call 911 anytime you think you may need emergency care. For example, call if:    You passed out (lost consciousness).     You have severe vaginal bleeding. This means you are soaking through a pad each hour for 2 or more hours.     You have sudden, severe pain in your belly or pelvis.   Call your doctor now or seek immediate medical care if:    You have new or worse vaginal bleeding.     You are dizzy or lightheaded, or you feel like you may faint.     You have pain in your belly, pelvis, or lower back.     You think that you are in labor.     You have a sudden release of fluid from your vagina.     You've been having regular contractions for an hour. This means that you've had at least 8 contractions within 1 hour or at least 4 contractions within 20 minutes, even after you change your position and drink fluids.     You notice that your baby has stopped moving or is moving much less than normal.   Watch closely for changes in your health, and be sure to contact your doctor if you have any problems.  Where can you learn more?  Go to https://www.[employer].net/patiented  Enter N829 in the search box to learn more about \"Vaginal Bleeding During Pregnancy: Care Instructions.\"  Current as of: November 9, 2022               Content Version: 13.7    2324-8399 [employer].   Care instructions adapted under license by your healthcare professional. If you have questions about a medical condition or this " instruction, always ask your healthcare professional. [employer] disclaims any warranty or liability for your use of this information.

## 2023-10-11 ENCOUNTER — ANCILLARY PROCEDURE (OUTPATIENT)
Dept: ULTRASOUND IMAGING | Facility: OTHER | Age: 33
End: 2023-10-11
Attending: OBSTETRICS & GYNECOLOGY
Payer: COMMERCIAL

## 2023-10-11 DIAGNOSIS — Z34.80 PRENATAL CARE, SUBSEQUENT PREGNANCY, UNSPECIFIED TRIMESTER: ICD-10-CM

## 2023-10-11 PROCEDURE — 76817 TRANSVAGINAL US OBSTETRIC: CPT | Mod: TC | Performed by: RADIOLOGY

## 2023-10-11 PROCEDURE — 76801 OB US < 14 WKS SINGLE FETUS: CPT | Mod: TC | Performed by: RADIOLOGY

## 2023-11-05 LAB
ABO/RH(D): NORMAL
ANTIBODY SCREEN: NEGATIVE
SPECIMEN EXPIRATION DATE: NORMAL

## 2023-11-06 ENCOUNTER — PRENATAL OFFICE VISIT (OUTPATIENT)
Dept: OBGYN | Facility: OTHER | Age: 33
End: 2023-11-06
Payer: COMMERCIAL

## 2023-11-06 VITALS
DIASTOLIC BLOOD PRESSURE: 85 MMHG | SYSTOLIC BLOOD PRESSURE: 138 MMHG | BODY MASS INDEX: 32.39 KG/M2 | HEIGHT: 64 IN | WEIGHT: 189.7 LBS

## 2023-11-06 DIAGNOSIS — O22.00 VARICOSE VEINS DURING PREGNANCY: ICD-10-CM

## 2023-11-06 DIAGNOSIS — O09.299 HISTORY OF PRE-ECLAMPSIA IN PRIOR PREGNANCY, CURRENTLY PREGNANT: ICD-10-CM

## 2023-11-06 DIAGNOSIS — Z34.91 NORMAL PREGNANCY IN FIRST TRIMESTER: Primary | ICD-10-CM

## 2023-11-06 LAB
ALBUMIN MFR UR ELPH: <4 MG/DL
ALBUMIN SERPL BCG-MCNC: 4.3 G/DL (ref 3.5–5.2)
ALBUMIN UR-MCNC: NEGATIVE MG/DL
ALP SERPL-CCNC: 56 U/L (ref 35–104)
ALT SERPL W P-5'-P-CCNC: 16 U/L (ref 0–50)
ANION GAP SERPL CALCULATED.3IONS-SCNC: 10 MMOL/L (ref 7–15)
APPEARANCE UR: CLEAR
AST SERPL W P-5'-P-CCNC: 21 U/L (ref 0–45)
BILIRUB SERPL-MCNC: 0.3 MG/DL
BILIRUB UR QL STRIP: NEGATIVE
BUN SERPL-MCNC: 10.9 MG/DL (ref 6–20)
CALCIUM SERPL-MCNC: 9.2 MG/DL (ref 8.6–10)
CHLORIDE SERPL-SCNC: 102 MMOL/L (ref 98–107)
COLOR UR AUTO: YELLOW
CREAT SERPL-MCNC: 0.42 MG/DL (ref 0.51–0.95)
CREAT UR-MCNC: 24.4 MG/DL
DEPRECATED HCO3 PLAS-SCNC: 25 MMOL/L (ref 22–29)
EGFRCR SERPLBLD CKD-EPI 2021: >90 ML/MIN/1.73M2
ERYTHROCYTE [DISTWIDTH] IN BLOOD BY AUTOMATED COUNT: 12.5 % (ref 10–15)
GLUCOSE SERPL-MCNC: 86 MG/DL (ref 70–99)
GLUCOSE UR STRIP-MCNC: NEGATIVE MG/DL
HCT VFR BLD AUTO: 37.7 % (ref 35–47)
HGB BLD-MCNC: 12.8 G/DL (ref 11.7–15.7)
HGB UR QL STRIP: NEGATIVE
KETONES UR STRIP-MCNC: NEGATIVE MG/DL
LEUKOCYTE ESTERASE UR QL STRIP: NEGATIVE
MCH RBC QN AUTO: 31.1 PG (ref 26.5–33)
MCHC RBC AUTO-ENTMCNC: 34 G/DL (ref 31.5–36.5)
MCV RBC AUTO: 92 FL (ref 78–100)
NITRATE UR QL: NEGATIVE
PH UR STRIP: 7 [PH] (ref 5–7)
PLATELET # BLD AUTO: 334 10E3/UL (ref 150–450)
POTASSIUM SERPL-SCNC: 4 MMOL/L (ref 3.4–5.3)
PROT SERPL-MCNC: 7 G/DL (ref 6.4–8.3)
PROT/CREAT 24H UR: NORMAL MG/G{CREAT}
RBC # BLD AUTO: 4.12 10E6/UL (ref 3.8–5.2)
SODIUM SERPL-SCNC: 137 MMOL/L (ref 135–145)
SP GR UR STRIP: 1.01 (ref 1–1.03)
UROBILINOGEN UR STRIP-ACNC: 0.2 E.U./DL
WBC # BLD AUTO: 8.1 10E3/UL (ref 4–11)

## 2023-11-06 PROCEDURE — 87491 CHLMYD TRACH DNA AMP PROBE: CPT | Performed by: OBSTETRICS & GYNECOLOGY

## 2023-11-06 PROCEDURE — 81003 URINALYSIS AUTO W/O SCOPE: CPT | Performed by: OBSTETRICS & GYNECOLOGY

## 2023-11-06 PROCEDURE — 87340 HEPATITIS B SURFACE AG IA: CPT | Performed by: OBSTETRICS & GYNECOLOGY

## 2023-11-06 PROCEDURE — 87086 URINE CULTURE/COLONY COUNT: CPT | Performed by: OBSTETRICS & GYNECOLOGY

## 2023-11-06 PROCEDURE — 86780 TREPONEMA PALLIDUM: CPT | Performed by: OBSTETRICS & GYNECOLOGY

## 2023-11-06 PROCEDURE — 86900 BLOOD TYPING SEROLOGIC ABO: CPT | Performed by: OBSTETRICS & GYNECOLOGY

## 2023-11-06 PROCEDURE — 36415 COLL VENOUS BLD VENIPUNCTURE: CPT | Performed by: OBSTETRICS & GYNECOLOGY

## 2023-11-06 PROCEDURE — 87591 N.GONORRHOEAE DNA AMP PROB: CPT | Performed by: OBSTETRICS & GYNECOLOGY

## 2023-11-06 PROCEDURE — 80053 COMPREHEN METABOLIC PANEL: CPT | Performed by: OBSTETRICS & GYNECOLOGY

## 2023-11-06 PROCEDURE — 86762 RUBELLA ANTIBODY: CPT | Performed by: OBSTETRICS & GYNECOLOGY

## 2023-11-06 PROCEDURE — 87389 HIV-1 AG W/HIV-1&-2 AB AG IA: CPT | Performed by: OBSTETRICS & GYNECOLOGY

## 2023-11-06 PROCEDURE — 85027 COMPLETE CBC AUTOMATED: CPT | Performed by: OBSTETRICS & GYNECOLOGY

## 2023-11-06 PROCEDURE — 84156 ASSAY OF PROTEIN URINE: CPT | Performed by: OBSTETRICS & GYNECOLOGY

## 2023-11-06 PROCEDURE — 99214 OFFICE O/P EST MOD 30 MIN: CPT | Performed by: OBSTETRICS & GYNECOLOGY

## 2023-11-06 PROCEDURE — 2894A PR VOIDCORRECT: CPT | Performed by: OBSTETRICS & GYNECOLOGY

## 2023-11-06 PROCEDURE — 86901 BLOOD TYPING SEROLOGIC RH(D): CPT | Performed by: OBSTETRICS & GYNECOLOGY

## 2023-11-06 PROCEDURE — 86803 HEPATITIS C AB TEST: CPT | Performed by: OBSTETRICS & GYNECOLOGY

## 2023-11-06 PROCEDURE — 86850 RBC ANTIBODY SCREEN: CPT | Performed by: OBSTETRICS & GYNECOLOGY

## 2023-11-06 NOTE — PROGRESS NOTES
HPI:    Binta is a 33 year old yo  at 10 3/7 weeks by early ultrasound who presents today for her initial OB visit.  Patient is not due for a pap smear today.    Issues: N/V.  Varicose veins.    Past OB Hx: 2 NSVDs.  1 SAB.     Father of baby: No health issues       Past Medical History:   Diagnosis Date    NO ACTIVE PROBLEMS              Past Surgical History:   Procedure Laterality Date    NO HISTORY OF SURGERY          History reviewed. No pertinent family history.     Social History     Socioeconomic History    Marital status:      Spouse name: Rex    Number of children: 0    Years of education: Not on file    Highest education level: Not on file   Occupational History    Occupation:    Tobacco Use    Smoking status: Never    Smokeless tobacco: Never   Vaping Use    Vaping Use: Never used   Substance and Sexual Activity    Alcohol use: Not Currently    Drug use: No    Sexual activity: Yes     Partners: Male     Birth control/protection: None   Other Topics Concern    Parent/sibling w/ CABG, MI or angioplasty before 65F 55M? Not Asked   Social History Narrative    Not on file     Social Determinants of Health     Financial Resource Strain: Not on file   Food Insecurity: Not on file   Transportation Needs: Not on file   Physical Activity: Not on file   Stress: Not on file   Social Connections: Not on file   Interpersonal Safety: Not on file   Housing Stability: Not on file         Current Outpatient Medications:     Ascorbic Acid (VITAMIN C) 500 MG PACK, , Disp: , Rfl:     FIBER ADULT GUMMIES PO, , Disp: , Rfl:     Prenatal Vit-Fe Fumarate-FA (PRENATAL VITAMIN PO), , Disp: , Rfl:       Objective:  Physical Exam:   Breast:  Benign exam, no masses palpated.  No skin changes, no axillary lymphadenopathy, no nipple discharge.  Axilla feel completely normal, no lymph node enlargement and non-tender.  Heart=RRR, no murmurs  Thyroid=normal, no masses, no TTP  Lungs=Clear to  ascultation bilateral, non-labored breathing  Abd=soft, Nontender/nondistended, +bowel sounds x4  PELVIC:    External genitalia: normal without lesion  Vagina: normal mucosa and rugae, no discharge.  Cervix: normal without lesion, healthy, multiparous, GC and Chlamydia obtained  Uterus: small, mobile, nontender.  Adnexa: non tender, without masses  Rectal: deffered  Ext=no clubbing or cyanosis  YNLe=888    Assessment:   1.  IUP at 10 3/7 weeks here for her initial OB visit    Plan:    1.  PNV  2.  NOB labs=CBC, T&S, Hept B and C, Syphilis, Rubella, HIV, U/A and flu vaccine  3.  Discussed routine prenatal care, quad screen, GCT, anatomy scan at ~19 weeks, frequency of visits.  4.  Discussed first trimester screen and she will think about it  5.  [place] [facility]  6.  RTC 4 weeks.  7.  White coat syndrome=normal blood pressures at home=107/73 this am at home.  Continue to check blood pressures at home and bring those results in.  8.  History of Pre-E with first=ASA.  Baseline labs today.  9.  Varicose veins=compression stockings ordered      Discussed physician coverage, tertiary support, diet, exercise, weight gain, schedule of visits, routine and indicated ultrasounds, and childbirth education.    Options for  testing for chromosomal and birth defects were discussed with the patient. Diagnostic tests include CVS and Amniocentesis. We discussed that these tests are definitive but invasive and do carry a risk of fetal loss.   Screening tests include nuchal translucency/blood marker testing in the first trimester and quad screening in the second trimester. We discussed that these are screening tests and not diagnostic tests and that false positives and negatives are a distinct possibility.     30 minutes was spent face to face with the patient today discussing her history, diagnosis, and follow-up plan as noted above.  Over 50% of the visit was spent in counseling and coordination of care.    Discussed  aspirin use in pregnancy.  Low-dose aspirin prophylaxis can be beneficial in women at high risk of developing preeclampsia.  I generally recommend we begin aspirin at about 12-13 weeks gestation and continue until at least 36 weeks.    Women with at least one of the following conditions are considered high risk for developing preeclampsia: Previous pregnancy with preeclampsia,  multifetal-gestation, chronic hypertension, diabetes mellitus, chronic kidney disease, autoimmune disease, and IVF.    Women with more than 1 of the following conditions may also consider low-dose aspirin prophylaxis in pregnancy: Nulliparity, BMI greater than 30, family history of preeclampsia (mother or sister), AMA, socio-demographic characteristics, personal risk factors.      Patient does meet the above criteria.  Discussed risks and benefits of low dose Asprin therapy and she elects to proceed.     Total Visit Time: 30 minutes    Annette Mckeon DO

## 2023-11-06 NOTE — PATIENT INSTRUCTIONS
Please call if you any questions.    [address]   [address]  [phone]        Annette Mckeon,

## 2023-11-07 LAB
C TRACH DNA SPEC QL NAA+PROBE: NEGATIVE
HBV SURFACE AG SERPL QL IA: NONREACTIVE
HCV AB SERPL QL IA: NONREACTIVE
HIV 1+2 AB+HIV1 P24 AG SERPL QL IA: NONREACTIVE
N GONORRHOEA DNA SPEC QL NAA+PROBE: NEGATIVE
RUBV IGG SERPL QL IA: 1.8 INDEX
RUBV IGG SERPL QL IA: POSITIVE
T PALLIDUM AB SER QL: NONREACTIVE

## 2023-11-08 LAB — BACTERIA UR CULT: NORMAL

## 2023-12-18 ENCOUNTER — ANCILLARY PROCEDURE (OUTPATIENT)
Dept: ULTRASOUND IMAGING | Facility: OTHER | Age: 33
End: 2023-12-18
Attending: OBSTETRICS & GYNECOLOGY
Payer: COMMERCIAL

## 2023-12-18 ENCOUNTER — PRENATAL OFFICE VISIT (OUTPATIENT)
Dept: OBGYN | Facility: OTHER | Age: 33
End: 2023-12-18
Payer: COMMERCIAL

## 2023-12-18 VITALS — BODY MASS INDEX: 33.55 KG/M2 | WEIGHT: 192.4 LBS | DIASTOLIC BLOOD PRESSURE: 84 MMHG | SYSTOLIC BLOOD PRESSURE: 138 MMHG

## 2023-12-18 DIAGNOSIS — O22.00 VARICOSE VEINS DURING PREGNANCY: ICD-10-CM

## 2023-12-18 DIAGNOSIS — O09.299 HISTORY OF PRE-ECLAMPSIA IN PRIOR PREGNANCY, CURRENTLY PREGNANT: ICD-10-CM

## 2023-12-18 DIAGNOSIS — Z34.92 NORMAL PREGNANCY IN SECOND TRIMESTER: Primary | ICD-10-CM

## 2023-12-18 PROCEDURE — 93971 EXTREMITY STUDY: CPT | Mod: TC | Performed by: RADIOLOGY

## 2023-12-18 PROCEDURE — 99207 PR PRENATAL VISIT: CPT | Performed by: OBSTETRICS & GYNECOLOGY

## 2023-12-18 NOTE — PROGRESS NOTES
33 year old  at 16w3d weeks presents to the clinic for a routine prenatal visit.  Feeling well.  No vaginal bleeding, leakage of fluid, or contractions   Fundal height=s=d  NCDx=453  Varicose veins=son hit her right lower leg Sat night and now she has developed a significant bruise on it.  Some pain now when she touches it.  She did have pain on Sat night.  Sleeping is uncomfortable.  She has been wearing both waist high and knee high compression stocking which does help.  Ext=significant bruise on inner right leg, indurated area in the middle of it, tender to palpation    Discussed quad screen and she declines.  History of pre-E=ASA.  BPs at home have been 99/70, 110/80, 104/74, 110/70, 111/73, 106/71  Will schedule anatomy ultrasound.  RTC 4 weeks.    Annette Mckeon DO

## 2023-12-18 NOTE — PATIENT INSTRUCTIONS
Please call if you any questions.    [address]   [address]  [phone]        Annette Mckeon,

## 2023-12-22 ENCOUNTER — MYC MEDICAL ADVICE (OUTPATIENT)
Dept: OBGYN | Facility: OTHER | Age: 33
End: 2023-12-22
Payer: COMMERCIAL

## 2024-01-08 ENCOUNTER — TELEPHONE (OUTPATIENT)
Dept: FAMILY MEDICINE | Facility: OTHER | Age: 34
End: 2024-01-08
Payer: COMMERCIAL

## 2024-01-08 NOTE — TELEPHONE ENCOUNTER
Called patient and LVM to give a call back as provider is out of the office. Okay to use any spot or approval only

## 2024-01-12 ENCOUNTER — ANCILLARY PROCEDURE (OUTPATIENT)
Dept: ULTRASOUND IMAGING | Facility: OTHER | Age: 34
End: 2024-01-12
Attending: OBSTETRICS & GYNECOLOGY
Payer: COMMERCIAL

## 2024-01-12 DIAGNOSIS — Z34.92 NORMAL PREGNANCY IN SECOND TRIMESTER: ICD-10-CM

## 2024-01-12 PROCEDURE — 76805 OB US >/= 14 WKS SNGL FETUS: CPT | Mod: TC | Performed by: RADIOLOGY

## 2024-01-18 ENCOUNTER — PRENATAL OFFICE VISIT (OUTPATIENT)
Dept: OBGYN | Facility: OTHER | Age: 34
End: 2024-01-18
Payer: COMMERCIAL

## 2024-01-18 VITALS — DIASTOLIC BLOOD PRESSURE: 90 MMHG | BODY MASS INDEX: 34.99 KG/M2 | WEIGHT: 200.7 LBS | SYSTOLIC BLOOD PRESSURE: 138 MMHG

## 2024-01-18 DIAGNOSIS — O09.299 HISTORY OF PRE-ECLAMPSIA IN PRIOR PREGNANCY, CURRENTLY PREGNANT: ICD-10-CM

## 2024-01-18 DIAGNOSIS — Z34.92 NORMAL PREGNANCY IN SECOND TRIMESTER: Primary | ICD-10-CM

## 2024-01-18 DIAGNOSIS — O22.00 VARICOSE VEINS OF BOTH LOWER EXTREMITIES DURING PREGNANCY: ICD-10-CM

## 2024-01-18 PROCEDURE — 99207 PR PRENATAL VISIT: CPT | Performed by: OBSTETRICS & GYNECOLOGY

## 2024-01-18 NOTE — PROGRESS NOTES
33 year old  at 20w6d weeks presents to the clinic for a routine prenatal visit.  No concerns.  No leakage of fluid, vaginal bleeding, or contractions   Good fetal movement.  Fundal height: s=d  FHTs: 156  White coat sydrome=BPs at home=100-113's/60-70's.  Blood pressure right before she left was 115/73.  Will check labs at next appointment with GTT  History of Pre-E=ASA.   Varicose veins=stable  Normal anatomy ultrasound  RTC 4 weeks    Annette Mckeon DO

## 2024-02-09 ENCOUNTER — PRENATAL OFFICE VISIT (OUTPATIENT)
Dept: OBGYN | Facility: OTHER | Age: 34
End: 2024-02-09
Payer: COMMERCIAL

## 2024-02-09 VITALS — SYSTOLIC BLOOD PRESSURE: 137 MMHG | BODY MASS INDEX: 35.55 KG/M2 | DIASTOLIC BLOOD PRESSURE: 76 MMHG | WEIGHT: 203.9 LBS

## 2024-02-09 DIAGNOSIS — Z34.92 NORMAL PREGNANCY IN SECOND TRIMESTER: Primary | ICD-10-CM

## 2024-02-09 DIAGNOSIS — O09.299 HISTORY OF PRE-ECLAMPSIA IN PRIOR PREGNANCY, CURRENTLY PREGNANT: ICD-10-CM

## 2024-02-09 DIAGNOSIS — O22.00 VARICOSE VEINS OF BOTH LOWER EXTREMITIES DURING PREGNANCY: ICD-10-CM

## 2024-02-09 LAB
ALT SERPL W P-5'-P-CCNC: 16 U/L (ref 0–50)
AST SERPL W P-5'-P-CCNC: 20 U/L (ref 0–45)
CREAT SERPL-MCNC: 0.44 MG/DL (ref 0.51–0.95)
EGFRCR SERPLBLD CKD-EPI 2021: >90 ML/MIN/1.73M2
ERYTHROCYTE [DISTWIDTH] IN BLOOD BY AUTOMATED COUNT: 12.6 % (ref 10–15)
GLUCOSE 1H P 50 G GLC PO SERPL-MCNC: 103 MG/DL (ref 70–129)
HCT VFR BLD AUTO: 34.7 % (ref 35–47)
HGB BLD-MCNC: 11.9 G/DL (ref 11.7–15.7)
MCH RBC QN AUTO: 31.6 PG (ref 26.5–33)
MCHC RBC AUTO-ENTMCNC: 34.3 G/DL (ref 31.5–36.5)
MCV RBC AUTO: 92 FL (ref 78–100)
PLATELET # BLD AUTO: 306 10E3/UL (ref 150–450)
RBC # BLD AUTO: 3.76 10E6/UL (ref 3.8–5.2)
WBC # BLD AUTO: 8.4 10E3/UL (ref 4–11)

## 2024-02-09 PROCEDURE — 84460 ALANINE AMINO (ALT) (SGPT): CPT | Performed by: OBSTETRICS & GYNECOLOGY

## 2024-02-09 PROCEDURE — 36415 COLL VENOUS BLD VENIPUNCTURE: CPT | Performed by: OBSTETRICS & GYNECOLOGY

## 2024-02-09 PROCEDURE — 99207 PR PRENATAL VISIT: CPT | Performed by: OBSTETRICS & GYNECOLOGY

## 2024-02-09 PROCEDURE — 85027 COMPLETE CBC AUTOMATED: CPT | Performed by: OBSTETRICS & GYNECOLOGY

## 2024-02-09 PROCEDURE — 84450 TRANSFERASE (AST) (SGOT): CPT | Performed by: OBSTETRICS & GYNECOLOGY

## 2024-02-09 PROCEDURE — 86780 TREPONEMA PALLIDUM: CPT | Performed by: OBSTETRICS & GYNECOLOGY

## 2024-02-09 PROCEDURE — 82565 ASSAY OF CREATININE: CPT | Performed by: OBSTETRICS & GYNECOLOGY

## 2024-02-09 PROCEDURE — 82950 GLUCOSE TEST: CPT | Performed by: OBSTETRICS & GYNECOLOGY

## 2024-02-09 NOTE — PROGRESS NOTES
33 year old  at 24w0d weeks presents to the clinic for a routine prenatal visit.  No concerns.  No vaginal bleeding, leakage of fluid, or contractions   Good fetal movement.  Fundal height=25cm   UNJp=284  History of pre-E=ASA  White coat syndrome=ASA=patient's blood pressures at home have been 105/69, 108/71, 11/70, 113/73, and 113/74.    Varicose veins=compression stockings  Normal anatomy ultrasound  RTC 4 weeks  GCT today  Blood type: A+    Annette Mckeon DO     84

## 2024-02-10 LAB — T PALLIDUM AB SER QL: NONREACTIVE

## 2024-03-11 ENCOUNTER — PRENATAL OFFICE VISIT (OUTPATIENT)
Dept: OBGYN | Facility: OTHER | Age: 34
End: 2024-03-11
Payer: COMMERCIAL

## 2024-03-11 VITALS — WEIGHT: 209.3 LBS | DIASTOLIC BLOOD PRESSURE: 89 MMHG | SYSTOLIC BLOOD PRESSURE: 147 MMHG | BODY MASS INDEX: 36.49 KG/M2

## 2024-03-11 DIAGNOSIS — Z34.93 NORMAL PREGNANCY IN THIRD TRIMESTER: Primary | ICD-10-CM

## 2024-03-11 DIAGNOSIS — O22.00 VARICOSE VEINS OF BOTH LOWER EXTREMITIES DURING PREGNANCY: ICD-10-CM

## 2024-03-11 DIAGNOSIS — Z23 NEED FOR TDAP VACCINATION: ICD-10-CM

## 2024-03-11 DIAGNOSIS — O09.299 HISTORY OF PRE-ECLAMPSIA IN PRIOR PREGNANCY, CURRENTLY PREGNANT: ICD-10-CM

## 2024-03-11 PROBLEM — Z34.92 NORMAL PREGNANCY IN SECOND TRIMESTER: Status: RESOLVED | Noted: 2020-05-19 | Resolved: 2024-03-11

## 2024-03-11 PROCEDURE — 99207 PR PRENATAL VISIT: CPT | Performed by: OBSTETRICS & GYNECOLOGY

## 2024-03-11 PROCEDURE — 90715 TDAP VACCINE 7 YRS/> IM: CPT | Performed by: OBSTETRICS & GYNECOLOGY

## 2024-03-11 PROCEDURE — 90471 IMMUNIZATION ADMIN: CPT | Performed by: OBSTETRICS & GYNECOLOGY

## 2024-03-11 NOTE — PROGRESS NOTES
33 year old  at 28w3d weeks presents to the clinic for a routine prenatal visit.  No concerns.  No vaginal bleeding, leakage of fluid, or contractions   Good fetal movement.  Varicose veins=getting worse, but managable.  Patient is wearing the compression stockings  White coat syndrome=147/89 here.  100-110's/60-70's at home.  Blood pressures to be scanned into chart.  ASA  Fundal height=30cm  UCXp=983  VFB=134  Hgb=11.89  Rh A+  RTC 2 weeks.  TDAP today    Annette Mckeon, DO

## 2024-03-19 ENCOUNTER — MYC MEDICAL ADVICE (OUTPATIENT)
Dept: OBGYN | Facility: OTHER | Age: 34
End: 2024-03-19
Payer: COMMERCIAL

## 2024-03-19 NOTE — TELEPHONE ENCOUNTER
Felipe received with complaint of pink, hard, warm area on right leg.      GA: 29w4d  Reports history of superficial clots  Imaging on 2023 to rule out DVT on right lower extremity    Last night patient noted new clot on outer right leg not in the same area as the clot on 2023, mild pain, no shortness of breath or chest pain.   Advised to closest hospital ED to rule out DVT.    Ann PLATA RN

## 2024-03-22 ENCOUNTER — PRENATAL OFFICE VISIT (OUTPATIENT)
Dept: OBGYN | Facility: OTHER | Age: 34
End: 2024-03-22
Payer: COMMERCIAL

## 2024-03-22 VITALS — DIASTOLIC BLOOD PRESSURE: 84 MMHG | SYSTOLIC BLOOD PRESSURE: 140 MMHG | BODY MASS INDEX: 35.87 KG/M2 | WEIGHT: 205.7 LBS

## 2024-03-22 DIAGNOSIS — I82.811 ACUTE SUPERFICIAL VENOUS THROMBOSIS OF LOWER EXTREMITY, RIGHT: ICD-10-CM

## 2024-03-22 DIAGNOSIS — O22.00 VARICOSE VEINS OF BOTH LOWER EXTREMITIES DURING PREGNANCY: ICD-10-CM

## 2024-03-22 DIAGNOSIS — O09.299 HISTORY OF PRE-ECLAMPSIA IN PRIOR PREGNANCY, CURRENTLY PREGNANT: ICD-10-CM

## 2024-03-22 DIAGNOSIS — Z34.93 NORMAL PREGNANCY IN THIRD TRIMESTER: Primary | ICD-10-CM

## 2024-03-22 DIAGNOSIS — R03.0 ELEVATED BLOOD PRESSURE READING WITHOUT DIAGNOSIS OF HYPERTENSION: ICD-10-CM

## 2024-03-22 LAB
ALBUMIN MFR UR ELPH: <6 MG/DL
CREAT UR-MCNC: 9.8 MG/DL
PROT/CREAT 24H UR: NORMAL MG/G{CREAT}

## 2024-03-22 PROCEDURE — 84156 ASSAY OF PROTEIN URINE: CPT | Performed by: OBSTETRICS & GYNECOLOGY

## 2024-03-22 PROCEDURE — 99207 PR PRENATAL VISIT: CPT | Performed by: OBSTETRICS & GYNECOLOGY

## 2024-03-22 NOTE — PROGRESS NOTES
33 year old  at 30w0d weeks presents to the clinic for a routine prenatal visit.  No concerns. Patient denies any vaginal bleeding, leakage of fluid, or contractions   Good fetal movement.    Fundal height=32cm  CNNx=029  YRX=397  Hgb=11.9  History of pre-E=ASA  White coat syndrome=normal blood pressure s at home.  Will check urine today  Patient was just seen at [place] ED for pain and erythema of her right lower extremity.  No DVT but a superficial venous thrombosis seen.  May need to start Lovenox.  Discussed with patient.  I will contact M and let patient know.  Warm compresses are helping with her signs and symptoms.    RTC 2 weeks    Annette Mckeon,

## 2024-03-23 ENCOUNTER — TELEPHONE (OUTPATIENT)
Dept: OBGYN | Facility: CLINIC | Age: 34
End: 2024-03-23
Payer: COMMERCIAL

## 2024-03-23 ENCOUNTER — MYC MEDICAL ADVICE (OUTPATIENT)
Dept: OBGYN | Facility: CLINIC | Age: 34
End: 2024-03-23
Payer: COMMERCIAL

## 2024-03-23 DIAGNOSIS — O22.00 VARICOSE VEINS OF BOTH LOWER EXTREMITIES DURING PREGNANCY: Primary | ICD-10-CM

## 2024-03-23 PROBLEM — I82.811 ACUTE SUPERFICIAL VENOUS THROMBOSIS OF LOWER EXTREMITY, RIGHT: Status: ACTIVE | Noted: 2024-03-23

## 2024-03-23 RX ORDER — ENOXAPARIN SODIUM 100 MG/ML
40 INJECTION SUBCUTANEOUS DAILY
Qty: 40 ML | Refills: 1 | Status: SHIPPED | OUTPATIENT
Start: 2024-03-23 | End: 2024-07-24

## 2024-03-24 NOTE — TELEPHONE ENCOUNTER
Patient needs to start taking Lovenox due to her recent diagnosis of a superficial venous thrombosis.  I sent patient a message regarding this.  I am not sure how to instruct patient on how to do this.  Do the RNs do this?  Or the pharmacist?  Please look into this and let me know.  She should start taking it ASAP.  Thanks!    Annette Mckeon, DO

## 2024-03-25 NOTE — TELEPHONE ENCOUNTER
RN called and talked to pt, pt states she is going to pick it up today. RN [facility] and [facility]    RN advised if after talking to pharmacist and reviewing videos if still has questions to bring in her medication to clinic for a nurse only with RN for review of how to administer.    Patient verbalized understanding and agreed to plan.     Nubia Walters RN on 3/25/2024 at 8:31 AM

## 2024-03-27 ENCOUNTER — TELEPHONE (OUTPATIENT)
Dept: OBGYN | Facility: CLINIC | Age: 34
End: 2024-03-27
Payer: COMMERCIAL

## 2024-03-27 NOTE — TELEPHONE ENCOUNTER
enoxaparin ANTICOAGULANT (LOVENOX) 40 MG/0.4ML syringe       Prior Authorization Retail Medication Request    Medication/Dose: enoxaparin ANTICOAGULANT (LOVENOX) 40 MG/0.4ML syringe  Diagnosis and ICD code (if different than what is on RX):    New/renewal/insurance change PA/secondary ins. PA:  Previously Tried and Failed:    Rationale:      Insurance   Primary:   Insurance ID:      Secondary (if applicable):  Insurance ID:      Pharmacy Information (if different than what is on RX)  Name:    Phone:    Fax:

## 2024-04-08 ENCOUNTER — PRENATAL OFFICE VISIT (OUTPATIENT)
Dept: OBGYN | Facility: OTHER | Age: 34
End: 2024-04-08
Payer: COMMERCIAL

## 2024-04-08 ENCOUNTER — MEDICAL CORRESPONDENCE (OUTPATIENT)
Dept: HEALTH INFORMATION MANAGEMENT | Facility: CLINIC | Age: 34
End: 2024-04-08

## 2024-04-08 VITALS — SYSTOLIC BLOOD PRESSURE: 137 MMHG | BODY MASS INDEX: 36.2 KG/M2 | DIASTOLIC BLOOD PRESSURE: 90 MMHG | WEIGHT: 207.6 LBS

## 2024-04-08 DIAGNOSIS — I82.811 ACUTE SUPERFICIAL VENOUS THROMBOSIS OF LOWER EXTREMITY, RIGHT: ICD-10-CM

## 2024-04-08 DIAGNOSIS — Z34.93 NORMAL PREGNANCY IN THIRD TRIMESTER: Primary | ICD-10-CM

## 2024-04-08 DIAGNOSIS — O22.00 VARICOSE VEINS OF BOTH LOWER EXTREMITIES DURING PREGNANCY: ICD-10-CM

## 2024-04-08 DIAGNOSIS — O09.299 HISTORY OF PRE-ECLAMPSIA IN PRIOR PREGNANCY, CURRENTLY PREGNANT: ICD-10-CM

## 2024-04-08 PROCEDURE — 99207 PR PRENATAL VISIT: CPT | Performed by: OBSTETRICS & GYNECOLOGY

## 2024-04-08 NOTE — PROGRESS NOTES
33 year old  at 32w3d weeks presents to the clinic for a routine prenatal visit.    No concerns.  Patient denies any vaginal bleeding, leakage of fluid, or contractions     Good fetal movement  Fundal height=34cm  AQJi=977  History of Pre-E=blood pressures at home have been stable.  No headache or vision changes, No N/V, no RUQ pain  Varicose veins with superficial venus thrombosis.  Is doing well with the Lovenox.  We discussed induction given the Lovenox and will discuss this further the closer we get.  Discussed labor precautions.  RTC 2 weeks    Annette Mckeon DO

## 2024-04-08 NOTE — TELEPHONE ENCOUNTER
PA Initiation    Medication: ENOXAPARIN SODIUM 40 MG/0.4ML IJ SOSY  Insurance Company: [employer] - Phone [phone] Fax [phone]  Pharmacy Filling the Rx: [id number] - [place], [place] - [address] [facility]  Filling Pharmacy Phone: [phone]  Filling Pharmacy Fax: [phone]  Start Date: 4/8/2024

## 2024-04-09 NOTE — TELEPHONE ENCOUNTER
Prior Authorization Approval    Medication: ENOXAPARIN SODIUM 40 MG/0.4ML IJ SOSY  Authorization Effective Date: 3/10/2024  Authorization Expiration Date: 4/9/2025  Approved Dose/Quantity:   Reference #:     Insurance Company: [employer] - Phone [phone] Fax [phone]  Expected CoPay: $    CoPay Card Available:      Financial Assistance Needed:   Which Pharmacy is filling the prescription: [id number] - [place], [place] - [address] [facility]  Pharmacy Notified: YES  Patient Notified: **Instructed pharmacy to notify patient when script is ready to /ship.**

## 2024-04-22 ENCOUNTER — PRENATAL OFFICE VISIT (OUTPATIENT)
Dept: OBGYN | Facility: OTHER | Age: 34
End: 2024-04-22
Payer: COMMERCIAL

## 2024-04-22 VITALS — SYSTOLIC BLOOD PRESSURE: 145 MMHG | DIASTOLIC BLOOD PRESSURE: 86 MMHG | BODY MASS INDEX: 37 KG/M2 | WEIGHT: 212.2 LBS

## 2024-04-22 DIAGNOSIS — O22.00 VARICOSE VEINS OF BOTH LOWER EXTREMITIES DURING PREGNANCY: ICD-10-CM

## 2024-04-22 DIAGNOSIS — O09.299 HISTORY OF PRE-ECLAMPSIA IN PRIOR PREGNANCY, CURRENTLY PREGNANT: ICD-10-CM

## 2024-04-22 DIAGNOSIS — Z34.93 NORMAL PREGNANCY IN THIRD TRIMESTER: Primary | ICD-10-CM

## 2024-04-22 DIAGNOSIS — I82.811 ACUTE SUPERFICIAL VENOUS THROMBOSIS OF LOWER EXTREMITY, RIGHT: ICD-10-CM

## 2024-04-22 LAB
ALBUMIN MFR UR ELPH: <6 MG/DL
ALT SERPL W P-5'-P-CCNC: 16 U/L (ref 0–50)
AST SERPL W P-5'-P-CCNC: 21 U/L (ref 0–45)
CREAT SERPL-MCNC: 0.53 MG/DL (ref 0.51–0.95)
CREAT UR-MCNC: 12.2 MG/DL
EGFRCR SERPLBLD CKD-EPI 2021: >90 ML/MIN/1.73M2
ERYTHROCYTE [DISTWIDTH] IN BLOOD BY AUTOMATED COUNT: 13.5 % (ref 10–15)
HCT VFR BLD AUTO: 36.6 % (ref 35–47)
HGB BLD-MCNC: 12.1 G/DL (ref 11.7–15.7)
MCH RBC QN AUTO: 31.1 PG (ref 26.5–33)
MCHC RBC AUTO-ENTMCNC: 33.1 G/DL (ref 31.5–36.5)
MCV RBC AUTO: 94 FL (ref 78–100)
PLATELET # BLD AUTO: 281 10E3/UL (ref 150–450)
PROT/CREAT 24H UR: NORMAL MG/G{CREAT}
RBC # BLD AUTO: 3.89 10E6/UL (ref 3.8–5.2)
WBC # BLD AUTO: 7.7 10E3/UL (ref 4–11)

## 2024-04-22 PROCEDURE — 85027 COMPLETE CBC AUTOMATED: CPT | Performed by: OBSTETRICS & GYNECOLOGY

## 2024-04-22 PROCEDURE — 84156 ASSAY OF PROTEIN URINE: CPT | Performed by: OBSTETRICS & GYNECOLOGY

## 2024-04-22 PROCEDURE — 82565 ASSAY OF CREATININE: CPT | Performed by: OBSTETRICS & GYNECOLOGY

## 2024-04-22 PROCEDURE — 36415 COLL VENOUS BLD VENIPUNCTURE: CPT | Performed by: OBSTETRICS & GYNECOLOGY

## 2024-04-22 PROCEDURE — 84460 ALANINE AMINO (ALT) (SGPT): CPT | Performed by: OBSTETRICS & GYNECOLOGY

## 2024-04-22 PROCEDURE — 99207 PR PRENATAL VISIT: CPT | Performed by: OBSTETRICS & GYNECOLOGY

## 2024-04-22 PROCEDURE — 84450 TRANSFERASE (AST) (SGOT): CPT | Performed by: OBSTETRICS & GYNECOLOGY

## 2024-04-22 NOTE — PROGRESS NOTES
33 year old  at 34w3d weeks presents to the clinic for a routine prenatal visit.    No concerns.  Patient denies any vaginal bleeding, leakage of fluid, or contractions     Good fetal movement  Fundal height=36cm  EUKd=794  History of pre-E=ASA.  Blood pressures at home have been stable.  Will do PIH labs today.  Patient denies any signs and symptoms. Will do a BPP next visit.  Superficial venous thrombosis=lovenox is going well  Discussed labor precautions.  RTC 2 weeks    Annette Mckeon,

## 2024-05-06 ENCOUNTER — ANCILLARY PROCEDURE (OUTPATIENT)
Dept: ULTRASOUND IMAGING | Facility: OTHER | Age: 34
End: 2024-05-06
Attending: OBSTETRICS & GYNECOLOGY
Payer: COMMERCIAL

## 2024-05-06 ENCOUNTER — PRENATAL OFFICE VISIT (OUTPATIENT)
Dept: OBGYN | Facility: OTHER | Age: 34
End: 2024-05-06
Payer: COMMERCIAL

## 2024-05-06 ENCOUNTER — MEDICAL CORRESPONDENCE (OUTPATIENT)
Dept: HEALTH INFORMATION MANAGEMENT | Facility: CLINIC | Age: 34
End: 2024-05-06

## 2024-05-06 VITALS — WEIGHT: 210.4 LBS | BODY MASS INDEX: 36.69 KG/M2 | DIASTOLIC BLOOD PRESSURE: 89 MMHG | SYSTOLIC BLOOD PRESSURE: 142 MMHG

## 2024-05-06 DIAGNOSIS — R03.0 ELEVATED BLOOD PRESSURE READING WITHOUT DIAGNOSIS OF HYPERTENSION: ICD-10-CM

## 2024-05-06 DIAGNOSIS — I82.811 ACUTE SUPERFICIAL VENOUS THROMBOSIS OF LOWER EXTREMITY, RIGHT: ICD-10-CM

## 2024-05-06 DIAGNOSIS — O09.299 HISTORY OF PRE-ECLAMPSIA IN PRIOR PREGNANCY, CURRENTLY PREGNANT: ICD-10-CM

## 2024-05-06 DIAGNOSIS — O22.00 VARICOSE VEINS OF BOTH LOWER EXTREMITIES DURING PREGNANCY: ICD-10-CM

## 2024-05-06 DIAGNOSIS — Z34.93 NORMAL PREGNANCY IN THIRD TRIMESTER: Primary | ICD-10-CM

## 2024-05-06 LAB
ALBUMIN MFR UR ELPH: <6 MG/DL
CREAT UR-MCNC: 13.3 MG/DL
PROT/CREAT 24H UR: NORMAL MG/G{CREAT}

## 2024-05-06 PROCEDURE — 87653 STREP B DNA AMP PROBE: CPT | Performed by: OBSTETRICS & GYNECOLOGY

## 2024-05-06 PROCEDURE — 84156 ASSAY OF PROTEIN URINE: CPT | Performed by: OBSTETRICS & GYNECOLOGY

## 2024-05-06 PROCEDURE — 76819 FETAL BIOPHYS PROFIL W/O NST: CPT | Mod: TC | Performed by: STUDENT IN AN ORGANIZED HEALTH CARE EDUCATION/TRAINING PROGRAM

## 2024-05-06 PROCEDURE — 99207 PR PRENATAL VISIT: CPT | Performed by: OBSTETRICS & GYNECOLOGY

## 2024-05-06 NOTE — PROGRESS NOTES
33 year old  at 36w3d weeks presents to the clinic for a routine prenatal visit.  No concerns.  No vaginal bleeding, leakage of fluid, or contractions  Fundal height=37cm  RAXb=494  CX=/-3  Questionable breech.  BPP to follow appointment today  GBS done today  Significant vulvar varicosities  White coat syndrome=blood pressures at home have been pjdrha=859-456's/70-80's.  No headache or vision changes, no RUQ or epigastric pain.  BPP today.  Will check urine Pr/Cr.  ASA  Given patient's elevated blood pressure, significant vulvar varicosities and discomfort, and Lovenox use I recommended patient be induced at 37 weeks.  Will await formal BPP results to determine presentation.  Superficial venous thrombosis=Lovenox  Labor precautions discussed  RTC 1 week    Annette Mckeon DO

## 2024-05-07 LAB — GP B STREP DNA SPEC QL NAA+PROBE: NEGATIVE

## 2024-05-09 ENCOUNTER — MYC MEDICAL ADVICE (OUTPATIENT)
Dept: OBGYN | Facility: OTHER | Age: 34
End: 2024-05-09
Payer: COMMERCIAL

## 2024-05-13 ENCOUNTER — PRENATAL OFFICE VISIT (OUTPATIENT)
Dept: OBGYN | Facility: OTHER | Age: 34
End: 2024-05-13
Payer: COMMERCIAL

## 2024-05-13 VITALS — BODY MASS INDEX: 36.56 KG/M2 | WEIGHT: 209.7 LBS | SYSTOLIC BLOOD PRESSURE: 137 MMHG | DIASTOLIC BLOOD PRESSURE: 86 MMHG

## 2024-05-13 DIAGNOSIS — I82.811 ACUTE SUPERFICIAL VENOUS THROMBOSIS OF LOWER EXTREMITY, RIGHT: ICD-10-CM

## 2024-05-13 DIAGNOSIS — O09.299 HISTORY OF PRE-ECLAMPSIA IN PRIOR PREGNANCY, CURRENTLY PREGNANT: ICD-10-CM

## 2024-05-13 DIAGNOSIS — O22.00 VARICOSE VEINS OF BOTH LOWER EXTREMITIES DURING PREGNANCY: ICD-10-CM

## 2024-05-13 DIAGNOSIS — Z34.93 NORMAL PREGNANCY IN THIRD TRIMESTER: Primary | ICD-10-CM

## 2024-05-13 PROCEDURE — 99207 PR PRENATAL VISIT: CPT | Performed by: OBSTETRICS & GYNECOLOGY

## 2024-05-13 RX ORDER — ALBUTEROL SULFATE 90 UG/1
2 AEROSOL, METERED RESPIRATORY (INHALATION)
COMMUNITY
Start: 2024-05-10 | End: 2024-07-24

## 2024-05-13 NOTE — PROGRESS NOTES
33 year old  at 37w3d weeks presents to the clinic for a routine prenatal visit.  Complains of feeling more pressure.  No vaginal bleeding, leakage of fluid, or contractions   Breech presentation=confirmed still breech by BSUS  Fundal height=39cm  DGOm=874's  Superficial venous thrombosis=Lovenox  Blood pressures at home have been 110-130/80's  Patient is scheduled tomorrow for a version and then induction due to her GHTN  History of Pre-E x2=ASA  CX=deferred per patient request  Discussed labor precautions  GBS=Negative    Annette Mckeon DO

## 2024-06-20 ENCOUNTER — MYC MEDICAL ADVICE (OUTPATIENT)
Dept: OBGYN | Facility: OTHER | Age: 34
End: 2024-06-20
Payer: COMMERCIAL

## 2024-06-20 ENCOUNTER — MEDICAL CORRESPONDENCE (OUTPATIENT)
Dept: HEALTH INFORMATION MANAGEMENT | Facility: CLINIC | Age: 34
End: 2024-06-20
Payer: COMMERCIAL

## 2024-06-28 ENCOUNTER — PRENATAL OFFICE VISIT (OUTPATIENT)
Dept: OBGYN | Facility: OTHER | Age: 34
End: 2024-06-28
Payer: COMMERCIAL

## 2024-06-28 VITALS — SYSTOLIC BLOOD PRESSURE: 129 MMHG | BODY MASS INDEX: 34.05 KG/M2 | DIASTOLIC BLOOD PRESSURE: 98 MMHG | WEIGHT: 195.3 LBS

## 2024-06-28 DIAGNOSIS — Z30.011 ENCOUNTER FOR INITIAL PRESCRIPTION OF CONTRACEPTIVE PILLS: ICD-10-CM

## 2024-06-28 DIAGNOSIS — I82.811 ACUTE SUPERFICIAL VENOUS THROMBOSIS OF LOWER EXTREMITY, RIGHT: ICD-10-CM

## 2024-06-28 PROBLEM — O09.299 HISTORY OF PRE-ECLAMPSIA IN PRIOR PREGNANCY, CURRENTLY PREGNANT: Status: RESOLVED | Noted: 2022-02-14 | Resolved: 2024-06-28

## 2024-06-28 PROBLEM — O22.00: Status: RESOLVED | Noted: 2022-09-23 | Resolved: 2024-06-28

## 2024-06-28 PROBLEM — Z23 NEED FOR TDAP VACCINATION: Status: RESOLVED | Noted: 2019-11-25 | Resolved: 2024-06-28

## 2024-06-28 PROBLEM — Z34.93 NORMAL PREGNANCY IN THIRD TRIMESTER: Status: RESOLVED | Noted: 2019-11-25 | Resolved: 2024-06-28

## 2024-06-28 PROCEDURE — 99213 OFFICE O/P EST LOW 20 MIN: CPT | Mod: 25 | Performed by: OBSTETRICS & GYNECOLOGY

## 2024-06-28 PROCEDURE — 99207 PR POST PARTUM EXAM: CPT | Performed by: OBSTETRICS & GYNECOLOGY

## 2024-06-28 RX ORDER — ACETAMINOPHEN AND CODEINE PHOSPHATE 120; 12 MG/5ML; MG/5ML
0.35 SOLUTION ORAL DAILY
Qty: 84 TABLET | Refills: 3 | Status: SHIPPED | OUTPATIENT
Start: 2024-06-28

## 2024-06-28 ASSESSMENT — ANXIETY QUESTIONNAIRES
3. WORRYING TOO MUCH ABOUT DIFFERENT THINGS: NOT AT ALL
5. BEING SO RESTLESS THAT IT IS HARD TO SIT STILL: NOT AT ALL
1. FEELING NERVOUS, ANXIOUS, OR ON EDGE: NOT AT ALL
7. FEELING AFRAID AS IF SOMETHING AWFUL MIGHT HAPPEN: NOT AT ALL
7. FEELING AFRAID AS IF SOMETHING AWFUL MIGHT HAPPEN: NOT AT ALL
GAD7 TOTAL SCORE: 0
GAD7 TOTAL SCORE: 0
6. BECOMING EASILY ANNOYED OR IRRITABLE: NOT AT ALL
2. NOT BEING ABLE TO STOP OR CONTROL WORRYING: NOT AT ALL
4. TROUBLE RELAXING: NOT AT ALL
GAD7 TOTAL SCORE: 0

## 2024-06-28 ASSESSMENT — PATIENT HEALTH QUESTIONNAIRE - PHQ9
SUM OF ALL RESPONSES TO PHQ QUESTIONS 1-9: 0
SUM OF ALL RESPONSES TO PHQ QUESTIONS 1-9: 0

## 2024-06-28 NOTE — PROGRESS NOTES
Subjective  33 year old non-pregnant female presents today for a postpartum visit.  Patient had an  on 5/15/2024.  No pain.  No vaginal bleeding.  No problems urinating.  Normal bowel movements.  Patient is breast feeding.  No signs and symptoms of ppd.  Patient scored a 0 on the PHQ-9 and a 0 on the NELA-7.  No thoughts of suicide or infanticide.  Patient is not due for a pap smear today.  Patient is wanting to do oral contractive pills for birth control.   White coat syndrome=patient's blood pressures at home have been wnl.  Her blood pressure today was 105/74.  She has been on Lovenox for superficial venous thrombosis.  She has not had intercourse.        ROS: 10 point ROS neg other than the symptoms noted above in the HPI.  Past Medical History:   Diagnosis Date    NO ACTIVE PROBLEMS      Past Surgical History:   Procedure Laterality Date    NO HISTORY OF SURGERY       History reviewed. No pertinent family history.  Social History     Tobacco Use    Smoking status: Never    Smokeless tobacco: Never   Substance Use Topics    Alcohol use: Not Currently         Objective  Vitals: BP (!) 129/98 (BP Location: Left arm, Cuff Size: Adult Regular)   Wt 88.6 kg (195 lb 4.8 oz)   LMP 2023   Breastfeeding Yes   BMI 34.05 kg/m    BMI= Body mass index is 34.05 kg/m .        Assessment  1.)  Post partum visit  2.)  S/p  on 5/15/2024  3.)  Birth control   4.)  History of superficial venous thrombosis  5.)  White coat syndrome       Plan  1.)  Oral contractive pills ordered  2.)  Discontinue Lovenox      Annette Mckeon

## 2024-07-11 ENCOUNTER — MYC MEDICAL ADVICE (OUTPATIENT)
Dept: OBGYN | Facility: OTHER | Age: 34
End: 2024-07-11
Payer: COMMERCIAL

## 2024-07-11 NOTE — TELEPHONE ENCOUNTER
Pt last seen 6/28/2024 for PP, vaginal delivery on 5/15/2024    Pt currently taking micronor daily around same time daily, exclusively breastfeeding. Pt having concerns for spotting, following intercourse. Denies any pain with intercourse.    RN routing to provider to advise if appt in clinic for further evaluation is recommended or continue to monitor if symptoms persisting/worsening.    Nubia Walters RN on 7/11/2024 at 11:13 AM

## 2024-07-12 NOTE — TELEPHONE ENCOUNTER
Mickey Allen DO  [id number] hours ago (6:02 PM)       Bleeding or spotting in the postpartum period can happen for many different reasons. If her bleeding or spotting continues for several days or longer, she should come for an office visit so an exam can be completed.

## 2024-07-14 ENCOUNTER — HEALTH MAINTENANCE LETTER (OUTPATIENT)
Age: 34
End: 2024-07-14

## 2024-07-15 ENCOUNTER — MEDICAL CORRESPONDENCE (OUTPATIENT)
Dept: HEALTH INFORMATION MANAGEMENT | Facility: CLINIC | Age: 34
End: 2024-07-15
Payer: COMMERCIAL

## 2024-07-23 SDOH — HEALTH STABILITY: PHYSICAL HEALTH: ON AVERAGE, HOW MANY DAYS PER WEEK DO YOU ENGAGE IN MODERATE TO STRENUOUS EXERCISE (LIKE A BRISK WALK)?: 2 DAYS

## 2024-07-23 SDOH — HEALTH STABILITY: PHYSICAL HEALTH: ON AVERAGE, HOW MANY MINUTES DO YOU ENGAGE IN EXERCISE AT THIS LEVEL?: 20 MIN

## 2024-07-23 ASSESSMENT — SOCIAL DETERMINANTS OF HEALTH (SDOH): HOW OFTEN DO YOU GET TOGETHER WITH FRIENDS OR RELATIVES?: ONCE A WEEK

## 2024-07-24 ENCOUNTER — MEDICAL CORRESPONDENCE (OUTPATIENT)
Dept: HEALTH INFORMATION MANAGEMENT | Facility: CLINIC | Age: 34
End: 2024-07-24

## 2024-07-24 ENCOUNTER — OFFICE VISIT (OUTPATIENT)
Dept: FAMILY MEDICINE | Facility: OTHER | Age: 34
End: 2024-07-24
Payer: COMMERCIAL

## 2024-07-24 VITALS
WEIGHT: 197 LBS | HEART RATE: 81 BPM | TEMPERATURE: 97 F | BODY MASS INDEX: 33.63 KG/M2 | SYSTOLIC BLOOD PRESSURE: 120 MMHG | HEIGHT: 64 IN | DIASTOLIC BLOOD PRESSURE: 88 MMHG | RESPIRATION RATE: 16 BRPM | OXYGEN SATURATION: 99 %

## 2024-07-24 DIAGNOSIS — M25.522 LEFT ELBOW PAIN: ICD-10-CM

## 2024-07-24 DIAGNOSIS — L72.3 SEBACEOUS CYST: ICD-10-CM

## 2024-07-24 DIAGNOSIS — Z00.00 ROUTINE GENERAL MEDICAL EXAMINATION AT A HEALTH CARE FACILITY: Primary | ICD-10-CM

## 2024-07-24 PROBLEM — I82.811 ACUTE SUPERFICIAL VENOUS THROMBOSIS OF LOWER EXTREMITY, RIGHT: Status: RESOLVED | Noted: 2024-03-23 | Resolved: 2024-07-24

## 2024-07-24 PROCEDURE — 99395 PREV VISIT EST AGE 18-39: CPT | Performed by: FAMILY MEDICINE

## 2024-07-24 PROCEDURE — 99213 OFFICE O/P EST LOW 20 MIN: CPT | Mod: 25 | Performed by: FAMILY MEDICINE

## 2024-07-24 ASSESSMENT — PAIN SCALES - GENERAL: PAINLEVEL: NO PAIN (0)

## 2024-07-24 NOTE — PATIENT INSTRUCTIONS
Patient Education   Preventive Care Advice   This is general advice given by our system to help you stay healthy. However, your care team may have specific advice just for you. Please talk to your care team about your preventive care needs.  Nutrition  Eat 5 or more servings of fruits and vegetables each day.  Try wheat bread, brown rice and whole grain pasta (instead of white bread, rice, and pasta).  Get enough calcium and vitamin D. Check the label on foods and aim for 100% of the RDA (recommended daily allowance).  Lifestyle  Exercise at least 150 minutes each week  (30 minutes a day, 5 days a week).  Do muscle strengthening activities 2 days a week. These help control your weight and prevent disease.  No smoking.  Wear sunscreen to prevent skin cancer.  Have a dental exam and cleaning every 6 months.  Yearly exams  See your health care team every year to talk about:  Any changes in your health.  Any medicines your care team has prescribed.  Preventive care, family planning, and ways to prevent chronic diseases.  Shots (vaccines)   HPV shots (up to age 26), if you've never had them before.  Hepatitis B shots (up to age 59), if you've never had them before.  COVID-19 shot: Get this shot when it's due.  Flu shot: Get a flu shot every year.  Tetanus shot: Get a tetanus shot every 10 years.  Pneumococcal, hepatitis A, and RSV shots: Ask your care team if you need these based on your risk.  Shingles shot (for age 50 and up)  General health tests  Diabetes screening:  Starting at age 35, Get screened for diabetes at least every 3 years.  If you are younger than age 35, ask your care team if you should be screened for diabetes.  Cholesterol test: At age 39, start having a cholesterol test every 5 years, or more often if advised.  Bone density scan (DEXA): At age 50, ask your care team if you should have this scan for osteoporosis (brittle bones).  Hepatitis C: Get tested at least once in your life.  STIs (sexually  transmitted infections)  Before age 24: Ask your care team if you should be screened for STIs.  After age 24: Get screened for STIs if you're at risk. You are at risk for STIs (including HIV) if:  You are sexually active with more than one person.  You don't use condoms every time.  You or a partner was diagnosed with a sexually transmitted infection.  If you are at risk for HIV, ask about PrEP medicine to prevent HIV.  Get tested for HIV at least once in your life, whether you are at risk for HIV or not.  Cancer screening tests  Cervical cancer screening: If you have a cervix, begin getting regular cervical cancer screening tests starting at age 21.  Breast cancer scan (mammogram): If you've ever had breasts, begin having regular mammograms starting at age 40. This is a scan to check for breast cancer.  Colon cancer screening: It is important to start screening for colon cancer at age 45.  Have a colonoscopy test every 10 years (or more often if you're at risk) Or, ask your provider about stool tests like a FIT test every year or Cologuard test every 3 years.  To learn more about your testing options, visit:   .  For help making a decision, visit:   https://bit.ly/ru33249.  Prostate cancer screening test: If you have a prostate, ask your care team if a prostate cancer screening test (PSA) at age 55 is right for you.  Lung cancer screening: If you are a current or former smoker ages 50 to 80, ask your care team if ongoing lung cancer screenings are right for you.  For informational purposes only. Not to replace the advice of your health care provider. Copyright   2023 [facility]. All rights reserved. Clinically reviewed by the [facility]. [facility] [id number] - REV 01/24.

## 2024-07-24 NOTE — PROGRESS NOTES
"Preventive Care Visit  [facility]  Maricruz Montana MD, MD, Family Medicine  Jul 24, 2024      Assessment & Plan         ICD-10-CM    1. Routine general medical examination at a health care facility  Z00.00 Lipid panel reflex to direct LDL Non-fasting      2. Sebaceous cyst  L72.3       3. Left elbow pain  M25.522         Reassured on most today and discussed that the lump on her scalp was a sebaceous cyst and although it can be surgically drained it is something that is not concerning for any significant issues for her and so it can be left alone as well.  She did just go through a large hormonal changes she is postpartum recently and so this may resolve some on its own with given time as we asked her just to leave it be for now and see how it improves.  As for the elbow she has pain that radiates down into her hand and a sharp shooting nature after leaning on her elbows we did talk about the nerve that goes through this area and that leaning on it could potentially cause aggravation of the nerve into the we talked about positions and leaning on it to potentially help she has recently  Started in with breast-feeding do so I do suspect it may be some thing to do with the position she is sitting and will feeding.  As there is no inflammation in the elbow and the elbow was otherwise normal today there is no further recommendations though we certainly can get a x-ray of the elbow if she is continue to have symptoms despite being careful about positioning.      No LOS data to display   Time spent by me doing chart review, history and exam, documentation and further activities per the note    Maricruz Montana MD     Patient has been advised of split billing requirements and indicates understanding: Yes        BMI  Estimated body mass index is 34.35 kg/m  as calculated from the following:    Height as of this encounter: 1.613 m (5' 3.5\").    Weight as of this encounter: 89.4 kg (197 lb).   Weight management " plan: Discussed healthy diet and exercise guidelines    Counseling  Appropriate preventive services were addressed with this patient via screening, questionnaire, or discussion as appropriate for fall prevention, nutrition, physical activity, Tobacco-use cessation, weight loss and cognition.  Checklist reviewing preventive services available has been given to the patient.  Reviewed patient's diet, addressing concerns and/or questions.   She is at risk for lack of exercise and has been provided with information to increase physical activity for the benefit of her well-being.           Sindy Judd is a 33 year old, presenting for the following:  Physical        7/24/2024     6:52 AM   Additional Questions   Roomed by angel   Accompanied by alone         7/24/2024     6:52 AM   Patient Reported Additional Medications   Patient reports taking the following new medications none        Health Care Directive  Patient does not have a Health Care Directive or Living Will: Discussed advance care planning with patient; information given to patient to review.    HPI    Has lump on head and a mole she wants checked. Elbow pain radiates to hand at times. Typically when pushing on it, shoooting discomfort          7/23/2024   General Health   How would you rate your overall physical health? Good   Feel stress (tense, anxious, or unable to sleep) Not at all            7/23/2024   Nutrition   Three or more servings of calcium each day? Yes   Diet: Regular (no restrictions)   How many servings of fruit and vegetables per day? (!) 2-3   How many sweetened beverages each day? 0-1            7/23/2024   Exercise   Days per week of moderate/strenous exercise 2 days   Average minutes spent exercising at this level 20 min      (!) EXERCISE CONCERN      7/23/2024   Social Factors   Frequency of gathering with friends or relatives Once a week   Worry food won't last until get money to buy more No   Food not last or not have enough  money for food? No   Do you have housing? (Housing is defined as stable permanent housing and does not include staying ouside in a car, in a tent, in an abandoned building, in an overnight shelter, or couch-surfing.) Yes   Are you worried about losing your housing? No   Lack of transportation? No   Unable to get utilities (heat,electricity)? No            7/23/2024   Dental   Dentist two times every year? Yes            7/23/2024   TB Screening   Were you born outside of the [place]? No                  7/23/2024   Substance Use   Alcohol more than 3/day or more than 7/wk No   Do you use any other substances recreationally? No        Social History     Tobacco Use    Smoking status: Never    Smokeless tobacco: Never   Vaping Use    Vaping status: Never Used   Substance Use Topics    Alcohol use: Yes    Drug use: No          Mammogram Screening - Patient under 40 years of age: Routine Mammogram Screening not recommended.         7/23/2024   STI Screening   New sexual partner(s) since last STI/HIV test? No        History of abnormal Pap smear: No - age 30- 64 PAP with HPV every 5 years recommended        Latest Ref Rng & Units 11/22/2021     3:36 PM 7/30/2018     8:32 AM 6/12/2015    12:00 AM   PAP / HPV   PAP  Negative for Intraepithelial Lesion or Malignancy (NILM)      PAP (Historical)   NIL     HPV 16 DNA Negative Negative      HPV 18 DNA Negative Negative      Other HR HPV Negative Negative      PAP-ABSTRACT    See Scanned Document           This result is from an external source.           7/23/2024   Contraception/Family Planning   Questions about contraception or family planning No           Reviewed and updated as needed this visit by Provider   Tobacco  Allergies  Meds  Problems  Med Hx  Surg Hx  Fam Hx                  Review of Systems  Constitutional, HEENT, cardiovascular, pulmonary, GI, , musculoskeletal, neuro, skin, endocrine and psych systems are negative, except as otherwise noted.     Objective  "   Exam  /88   Pulse 81   Temp 97  F (36.1  C) (Temporal)   Resp 16   Ht 1.613 m (5' 3.5\")   Wt 89.4 kg (197 lb)   LMP 08/12/2023   SpO2 99%   Breastfeeding Yes   BMI 34.35 kg/m     Estimated body mass index is 34.35 kg/m  as calculated from the following:    Height as of this encounter: 1.613 m (5' 3.5\").    Weight as of this encounter: 89.4 kg (197 lb).    Physical Exam  GENERAL: alert and no distress  EYES: Eyes grossly normal to inspection, PERRL and conjunctivae and sclerae normal  HENT: ear canals and TM's normal, nose and mouth without ulcers or lesions  NECK: no adenopathy, no asymmetry, masses, or scars  RESP: lungs clear to auscultation - no rales, rhonchi or wheezes  CV: regular rate and rhythm, normal S1 S2, no S3 or S4, no murmur, click or rub, no peripheral edema  ABDOMEN: soft, nontender, no hepatosplenomegaly, no masses and bowel sounds normal  MS: no gross musculoskeletal defects noted, no edema  SKIN: no suspicious lesions or rashes  NEURO: Normal strength and tone, mentation intact and speech normal  PSYCH: mentation appears normal, affect normal/bright        Signed Electronically by: Maricruz Montana MD, MD    "

## 2024-08-13 ENCOUNTER — OFFICE VISIT (OUTPATIENT)
Dept: VASCULAR SURGERY | Facility: CLINIC | Age: 34
End: 2024-08-13
Payer: COMMERCIAL

## 2024-08-13 DIAGNOSIS — I83.813 VARICOSE VEINS OF BILATERAL LOWER EXTREMITIES WITH PAIN: Primary | ICD-10-CM

## 2024-08-13 DIAGNOSIS — I83.893 VARICOSE VEINS OF LEG WITH EDEMA, BILATERAL: ICD-10-CM

## 2024-08-13 PROCEDURE — 99213 OFFICE O/P EST LOW 20 MIN: CPT | Performed by: SPECIALIST

## 2024-08-13 PROCEDURE — G2211 COMPLEX E/M VISIT ADD ON: HCPCS | Performed by: SPECIALIST

## 2024-08-13 NOTE — PROGRESS NOTES
Follow-up for varicose veins      Subjective:  Patient is a 33-year-old white female well-known to me for superficial phlebitis last seen in 2022.  She is currently 12 weeks postpartum.  At that time she complained of pain swelling achiness and swelling worse at the end of the day despite wearing compression socks.  She has been wearing compression socks for several years now.  At that time she was not finished having children and any treatment was delayed.  She has had another child since then and the veins significantly worsened during that pregnancy.  She also had 2 episodes of superficial phlebitis during that pregnancy.  Her right leg symptoms are worse than her left.  She is now thinking she would like testing done about her veins as she is symptomatic despite compression and finished having children.      Objective:  B/P: Data Unavailable, T: Data Unavailable, P: Data Unavailable, R: Data Unavailable  Extremities:warm, trace trace ankle edema, extensive thigh and calf varicosities bilaterally.  No current superficial phlebitis.      Assessment/plan:  This is a 33-year-old lady with bilateral symptomatic varicose veins despite compression.  She is a CEAP 3 bilaterally.  I briefly discussed the role of compression therapy as well as the role of ultrasound.  As her ultrasound is almost 2 years old I did recommend a repeat and she is agreement with that plan.  She does meet criteria for treatment.  After discussion with the patient the plan at this time is to repeat an ultrasound of both her lower extremities and proceed based on those findings.  She will follow-up with me for a video visit after the ultrasound.    Wai Curiel MD, FACS

## 2024-08-13 NOTE — LETTER
8/13/2024      Binta Peterson  [id number] [address] [address]      Dear Colleague,    Thank you for referring your patient, Binta Peterson, to the [facility] [place]. Please see a copy of my visit note below.    Follow-up for varicose veins      Subjective:  Patient is a 33-year-old white female well-known to me for superficial phlebitis last seen in 2022.  She is currently 12 weeks postpartum.  At that time she complained of pain swelling achiness and swelling worse at the end of the day despite wearing compression socks.  She has been wearing compression socks for several years now.  At that time she was not finished having children and any treatment was delayed.  She has had another child since then and the veins significantly worsened during that pregnancy.  She also had 2 episodes of superficial phlebitis during that pregnancy.  Her right leg symptoms are worse than her left.  She is now thinking she would like testing done about her veins as she is symptomatic despite compression and finished having children.      Objective:  B/P: Data Unavailable, T: Data Unavailable, P: Data Unavailable, R: Data Unavailable  Extremities:warm, trace trace ankle edema, extensive thigh and calf varicosities bilaterally.  No current superficial phlebitis.      Assessment/plan:  This is a 33-year-old lady with bilateral symptomatic varicose veins despite compression.  She is a CEAP 3 bilaterally.  I briefly discussed the role of compression therapy as well as the role of ultrasound.  As her ultrasound is almost 2 years old I did recommend a repeat and she is agreement with that plan.  She does meet criteria for treatment.  After discussion with the patient the plan at this time is to repeat an ultrasound of both her lower extremities and proceed based on those findings.  She will follow-up with me for a video visit after the ultrasound.    Wai Curiel MD, FACS      Again, thank you for  allowing me to participate in the care of your patient.        Sincerely,        Wai Curiel MD

## 2024-08-13 NOTE — NURSING NOTE
Patient Reported symptoms:    Bilateral leg   Heaviness Some of the time   Achiness Most of the time   Swelling None of the time   Throbbing None of the time   Itching Some of the time   Appearance Very noticeable   Impact on work/activities Symptoms but full able to participate

## 2024-08-15 ENCOUNTER — ANCILLARY PROCEDURE (OUTPATIENT)
Dept: ULTRASOUND IMAGING | Facility: CLINIC | Age: 34
End: 2024-08-15
Attending: SPECIALIST
Payer: COMMERCIAL

## 2024-08-15 DIAGNOSIS — I83.813 VARICOSE VEINS OF BILATERAL LOWER EXTREMITIES WITH PAIN: ICD-10-CM

## 2024-08-15 PROCEDURE — 93970 EXTREMITY STUDY: CPT | Performed by: SPECIALIST

## 2024-08-20 ENCOUNTER — VIRTUAL VISIT (OUTPATIENT)
Dept: VASCULAR SURGERY | Facility: CLINIC | Age: 34
End: 2024-08-20
Attending: SPECIALIST
Payer: COMMERCIAL

## 2024-08-20 DIAGNOSIS — I83.813 VARICOSE VEINS OF BILATERAL LOWER EXTREMITIES WITH PAIN: Primary | ICD-10-CM

## 2024-08-20 DIAGNOSIS — I83.893 VARICOSE VEINS OF LEG WITH EDEMA, BILATERAL: ICD-10-CM

## 2024-08-20 PROCEDURE — 99213 OFFICE O/P EST LOW 20 MIN: CPT | Mod: 95 | Performed by: SPECIALIST

## 2024-08-20 NOTE — LETTER
2024      Binta Peterson  [id number] [facility] [id number]      Dear Colleague,    Thank you for referring your patient, Binta Peterson, to the [facility] [place]. Please see a copy of my visit note below.    Video visit  Start time 822  End time: 850    Follow-up for varicose veins      Subjective:  Patient is a 33-year-old white female well-known to me for superficial phlebitis last seen in .  She is currently 12 weeks postpartum.  At that time she complained of pain swelling achiness and swelling worse at the end of the day despite wearing compression socks.  She has been wearing compression socks for several years now.  At that time she was not finished having children and any treatment was delayed.  She has had another child since then and the veins significantly worsened during that pregnancy.  She also had 2 episodes of superficial phlebitis during that pregnancy.  Her right leg symptoms are worse than her left.  She is now thinking she would like testing done about her veins as she is symptomatic despite compression and finished having children.      She had her ultrasound for which she now follows up      Objective:  Extremities:warm, trace trace ankle edema, extensive thigh and calf varicosities bilaterally.  No current superficial phlebitis.    US -   Name:  Binta Peterson                                                         Patient ID: [id number]  Date: August 15, 2024                                                            : 1990  Sex: female                                                                 Examined by: CHIKA Kim RVT  Age:  33 year old                                                         Reading MD: JONY     INDICATION:  Varicose veins of bilateral lower extremities with pain.  History of right superficial thrombophlebitis.     EXAM TYPE  BILATERAL LOWER EXTREMITY VENOUS DUPLEX FOR VENOUS INSUFFICIENCY  TECHNICAL SUMMARY     A  duplex ultrasound study using color flow was performed to evaluate the bilateral lower extremity veins for valvular incompetence with the patient in a steep reversed trendelenberg.      RIGHT:     The deep veins demonstrate phasic flow, compress, and respond to augmentations.  There is no evidence of DVT.  The common femoral, femoral, and popliteal veins are incompetent and free of thrombus.      The GSV demonstrates phasic flow, compresses, and responds to augmentations from the saphenofemoral junction to the ankle with no evidence of  thrombus. The great saphenous vein measures 8.1 mm at the saphenofemoral junction, 7.1 mm in the proximal thigh, and 3.5 mm at the knee.  The GSV courses superficially out of the fascia from the mid thigh to the proximal calf.  The GSV is incompetent from the SFJ to the proximal thigh and again in the proximal calf, with the greatest reflux time of 3563 milliseconds.       The AASV is competent (2.6 mm) draining into the saphenofemoral junction and incompetent (7.5 mm) from the proximal thigh to the lower thigh with a reflux time of 8595 milliseconds. The AASV is tortuous proximally with multiple varicose veins coursing within the fascia with the AASV.  The AASV is mildly tortuous from the upper to lower thigh and courses superficially out of the fascia at 20 cm from the SFJ.   The AASV gives rise to multiple varicose veins and measures 5.4 mm out of the fascia coursing anteriorly with a reflux time of 2705 milliseconds.      The Giacomini vein is competent ( 1.7 mm) communicating with the small saphenous vein at the knee level.      The SSV demonstrates phasic flow, compresses, and responds to augmentations from the popliteal space to the ankle.  No thrombus is seen.  The saphenopopliteal junction is absent. The SSV is incompetent in the proximal calf with a reflux time of 3044 milliseconds.       Perforators: There is an incompetent  vein ( 4.0 mm) at 7 cm distal to the  medial knee crease that communicates with a varicose vein measuring 3.8 mm. A second incompetent  vein (6.3 mm) is found at the SFJ that communicates the CFV and pelvic source veins at the SFJ.     LEFT:     The deep veins demonstrate phasic flow, compress, and respond to augmentations.  There is no evidence of DVT.  The proximal femoral vein is incompetent and free of thrombus. The remaining deep veins are competent and free of thrombus.      The GSV demonstrates phasic flow, compresses, and responds to augmentations from the saphenofemoral junction to the ankle with no evidence of thrombus. The great saphenous vein measures 8.6 mm at the saphenofemoral junction, 7.5 mm in the proximal thigh, and 4.0 mm at the knee.The GSV is incompetent from the SFJ to the proximal calf, with the greatest reflux time of 4141 milliseconds.       The AASV is competent ( 3.0 mm) draining into the saphenofemoral junction and incompetent from the proximal to lower thigh with a reflux time of 4215 milliseconds. Multiple varicose veins course within the fascia with the AASV in the very proximal thigh before it takes a straight course for 17 cm breaking through the fascia and giving rise to a varicose branch measuring 8.2 mm that courses anteromedially with a reflux time of 4215 milliseconds.      The Giacomini vein is competent ( 3.2 mm) communicating with the small saphenous vein at the knee level.      The SSV demonstrates phasic flow, compresses, and responds to augmentations from the popliteal space to the ankle.  No thrombus is seen.  The saphenopopliteal junction is absent. The SSV is incompetent from the proximal to mid calf with a reflux time of 5955 milliseconds.       Perforators:  There is an incompetent  vein ( 5.2 mm) at level of the SFJ that communicates with a pelvic source vein measuring 11.3 mm.     FINAL SUMMARY:     Right lower extremity:  1.  There is no evidence of DVT.  The common femoral femoral  and popliteal veins are incompetent.  2.  The GSV is incompetent from SFJ to proximal thigh and again in the proximal calf.  3.  The AASV is incompetent from the proximal to lower thigh.  It gives rise to multiple torturous varicose branches.  4.  The SSV is incompetent for short segment in the proximal calf.  5.  There is an incompetent  7 cm distal to the medial knee crease that communicates with a varicose vein.  There is a second incompetent  at the SFJ that communicates with the common femoral vein and a pelvic source veins.     Left lower extremity:  1.  There is no DVT.  The proximal femoral vein is incompetent.  2.  The GSV is incompetent from SFJ to proximal calf.  3.  The AASV is incompetent proximal to lower thigh.  It gives rise to multiple incompetent varicose branches.  3.  The SSV is incompetent proximal to mid calf.  4.  There is an incompetent  at the level of the SFJ that communicates with the pelvic source of pain.        Wai Curiel MD, FACS  ,      Assessment/plan:  This is a 33-year-old lady with bilateral symptomatic varicose veins despite compression.  She is a CEAP 3 bilaterally.  I briefly discussed the role of compression therapy as well as the role of ultrasound.      Her repeat ultrasound revealed right proximal GSV, AASV incompetence as well as a .  On the left she has proximal GSV AASV incompetence.  In addition there is some pelvic source incompetence feeding perforation of the SFJ.  Based on her ultrasound she is a candidate for a right GSV, AASV and  ablation with stab phlebectomy.  On the left she is a candidate for a GSV,, AASV, SSV and  ablation, as well as stab phlebectomy I would hold on treating the pelvic source veins at this time.  Should her legs improve with this treatment then we will observe pelvic source veins.  If no further improvement will send for venogram a coil embolization.   The procedure, risks,  benefits, and alternatives were discussed and the patient agrees to proceed.  .     Wai Curiel MD, FACS      Again, thank you for allowing me to participate in the care of your patient.        Sincerely,        Wai Curiel MD

## 2024-08-20 NOTE — PROGRESS NOTES
Video visit  Start time 822  End time: 850    Follow-up for varicose veins      Subjective:  Patient is a 33-year-old white female well-known to me for superficial phlebitis last seen in .  She is currently 12 weeks postpartum.  At that time she complained of pain swelling achiness and swelling worse at the end of the day despite wearing compression socks.  She has been wearing compression socks for several years now.  At that time she was not finished having children and any treatment was delayed.  She has had another child since then and the veins significantly worsened during that pregnancy.  She also had 2 episodes of superficial phlebitis during that pregnancy.  Her right leg symptoms are worse than her left.  She is now thinking she would like testing done about her veins as she is symptomatic despite compression and finished having children.      She had her ultrasound for which she now follows up      Objective:  Extremities:warm, trace trace ankle edema, extensive thigh and calf varicosities bilaterally.  No current superficial phlebitis.    US -   Name:  Binta Peterson                                                         Patient ID: [id number]  Date: August 15, 2024                                                            : 1990  Sex: female                                                                 Examined by: CHIKA Kim RVT  Age:  33 year old                                                         Reading MD: JONY     INDICATION:  Varicose veins of bilateral lower extremities with pain.  History of right superficial thrombophlebitis.     EXAM TYPE  BILATERAL LOWER EXTREMITY VENOUS DUPLEX FOR VENOUS INSUFFICIENCY  TECHNICAL SUMMARY     A duplex ultrasound study using color flow was performed to evaluate the bilateral lower extremity veins for valvular incompetence with the patient in a steep reversed trendelenberg.      RIGHT:     The deep veins demonstrate phasic flow, compress,  and respond to augmentations.  There is no evidence of DVT.  The common femoral, femoral, and popliteal veins are incompetent and free of thrombus.      The GSV demonstrates phasic flow, compresses, and responds to augmentations from the saphenofemoral junction to the ankle with no evidence of  thrombus. The great saphenous vein measures 8.1 mm at the saphenofemoral junction, 7.1 mm in the proximal thigh, and 3.5 mm at the knee.  The GSV courses superficially out of the fascia from the mid thigh to the proximal calf.  The GSV is incompetent from the SFJ to the proximal thigh and again in the proximal calf, with the greatest reflux time of 3563 milliseconds.       The AASV is competent (2.6 mm) draining into the saphenofemoral junction and incompetent (7.5 mm) from the proximal thigh to the lower thigh with a reflux time of 8595 milliseconds. The AASV is tortuous proximally with multiple varicose veins coursing within the fascia with the AASV.  The AASV is mildly tortuous from the upper to lower thigh and courses superficially out of the fascia at 20 cm from the SFJ.   The AASV gives rise to multiple varicose veins and measures 5.4 mm out of the fascia coursing anteriorly with a reflux time of 2705 milliseconds.      The Giacomini vein is competent ( 1.7 mm) communicating with the small saphenous vein at the knee level.      The SSV demonstrates phasic flow, compresses, and responds to augmentations from the popliteal space to the ankle.  No thrombus is seen.  The saphenopopliteal junction is absent. The SSV is incompetent in the proximal calf with a reflux time of 3044 milliseconds.       Perforators: There is an incompetent  vein ( 4.0 mm) at 7 cm distal to the medial knee crease that communicates with a varicose vein measuring 3.8 mm. A second incompetent  vein (6.3 mm) is found at the SFJ that communicates the CFV and pelvic source veins at the SFJ.     LEFT:     The deep veins demonstrate  phasic flow, compress, and respond to augmentations.  There is no evidence of DVT.  The proximal femoral vein is incompetent and free of thrombus. The remaining deep veins are competent and free of thrombus.      The GSV demonstrates phasic flow, compresses, and responds to augmentations from the saphenofemoral junction to the ankle with no evidence of thrombus. The great saphenous vein measures 8.6 mm at the saphenofemoral junction, 7.5 mm in the proximal thigh, and 4.0 mm at the knee.The GSV is incompetent from the SFJ to the proximal calf, with the greatest reflux time of 4141 milliseconds.       The AASV is competent ( 3.0 mm) draining into the saphenofemoral junction and incompetent from the proximal to lower thigh with a reflux time of 4215 milliseconds. Multiple varicose veins course within the fascia with the AASV in the very proximal thigh before it takes a straight course for 17 cm breaking through the fascia and giving rise to a varicose branch measuring 8.2 mm that courses anteromedially with a reflux time of 4215 milliseconds.      The Giacomini vein is competent ( 3.2 mm) communicating with the small saphenous vein at the knee level.      The SSV demonstrates phasic flow, compresses, and responds to augmentations from the popliteal space to the ankle.  No thrombus is seen.  The saphenopopliteal junction is absent. The SSV is incompetent from the proximal to mid calf with a reflux time of 5955 milliseconds.       Perforators:  There is an incompetent  vein ( 5.2 mm) at level of the SFJ that communicates with a pelvic source vein measuring 11.3 mm.     FINAL SUMMARY:     Right lower extremity:  1.  There is no evidence of DVT.  The common femoral femoral and popliteal veins are incompetent.  2.  The GSV is incompetent from SFJ to proximal thigh and again in the proximal calf.  3.  The AASV is incompetent from the proximal to lower thigh.  It gives rise to multiple torturous varicose  branches.  4.  The SSV is incompetent for short segment in the proximal calf.  5.  There is an incompetent  7 cm distal to the medial knee crease that communicates with a varicose vein.  There is a second incompetent  at the SFJ that communicates with the common femoral vein and a pelvic source veins.     Left lower extremity:  1.  There is no DVT.  The proximal femoral vein is incompetent.  2.  The GSV is incompetent from SFJ to proximal calf.  3.  The AASV is incompetent proximal to lower thigh.  It gives rise to multiple incompetent varicose branches.  3.  The SSV is incompetent proximal to mid calf.  4.  There is an incompetent  at the level of the SFJ that communicates with the pelvic source of pain.        Wai Curiel MD, FACS  ,      Assessment/plan:  This is a 33-year-old lady with bilateral symptomatic varicose veins despite compression.  She is a CEAP 3 bilaterally.  I briefly discussed the role of compression therapy as well as the role of ultrasound.      Her repeat ultrasound revealed right proximal GSV, AASV incompetence as well as a .  On the left she has proximal GSV AASV incompetence.  In addition there is some pelvic source incompetence feeding perforation of the SFJ.  Based on her ultrasound she is a candidate for a right GSV, AASV and  ablation with stab phlebectomy.  On the left she is a candidate for a GSV,, AASV, SSV and  ablation, as well as stab phlebectomy I would hold on treating the pelvic source veins at this time.  Should her legs improve with this treatment then we will observe pelvic source veins.  If no further improvement will send for venogram a coil embolization.   The procedure, risks, benefits, and alternatives were discussed and the patient agrees to proceed.  .     Wai Curiel MD, FACS

## 2024-08-21 NOTE — PATIENT INSTRUCTIONS
Laura Bautista, Surgery Scheduler - [phone].    Procedure Plan:   1. Right leg VNUS closure Great Saphenous Vein,  Accessory Saphenous Vein, 1 perf and 20-30 Phlebectomies (medically necessary)    2. Left leg VNUS closure Great saphenous vein, Accessory Saphenous Vein, Small Saphenous Vein and 20-30 Phlebectomies (medically necessary)    Please call our office regarding the status of your insurance authorization if it has been more than 3 weeks and you have not heard from our surgery scheduler.        Pre-Procedure Instructions:                           VNUS Closure and Phlebectomies   You are having a Closure(s) - where one or more veins are closed and Phlebectomies - where one or more veins are removed.   Insurance  Precertification and/or referral authorization may be required by your insurance company.  We will call your insurance company to verify benefits for the medically necessary part of your procedure.    Your Current Medications and Allergies  To reduce bruising, please do not take aspirin-type medications (Motrin, Aleve, Ibuprofen, Advil, etc.) for three days before your procedure. You may take Tylenol if you need a pain reliever.  Are you on blood thinner medications? (Plavix, Coumadin, Eliquis, Xarelto) Please discuss this with your surgeon. You may resume taking your blood thinner medication after your procedure.  Are you sensitive to latex or adhesives used for fake fingernails? Please let us know!    Driving Escort and   Please arrange to have a trusted adult (18 years old or older) drive you to and from the clinic.  For your safety, we recommend you have a trusted adult to stay with you until the next morning.    Your Health  If you have a change in your health before the procedure, contact our office immediately. (For example: cold symptoms, cough, urinary tract infection, fever, flu symptoms.)  A pre-procedure physical is not required.    Note  It is sometimes necessary to adjust  the procedure schedule due to emergencies. We greatly appreciate your flexibility and understanding in this matter.                  Check List: The Morning of Your Procedure  ___1. Please do not put anything on your leg(s) or shave the day of your procedure.  ___2. You may take your normal medications the day of your procedure.  ___3. It is recommended you eat a light breakfast or lunch the day of your procedure.  ___4. Wear comfortable loose-fitting clothing and wide-fitting shoes (i.e. tennis shoes, slip-ons).  ___5. Please arrive at our clinic at the specified time given by the nurse.  ___6. You will sign an affirmation of informed consent.  ___7. Bring your pre-procedure sedation medication (lorazepam and clonidine) with you to the clinic.              One hour before your procedure, you will be instructed to take these medications. The lorazepam              (Ativan) lowers anxiety and sedates you; the clonidine makes the lorazepam more effective. Everyone's              body processes these medications differently. Therefore, reactions to these medications vary. Some              people stay awake and some people sleep through the whole procedure. You may not remember              everything about the procedure or the day. You do not want to make any big decisions for the rest of the              day.    The Day of Your Procedure:       VNUS Closure and Phlebectomies  In the Exam Room  A nurse will bring you back to an exam room with your family member or friend. This is when your informed consent will be signed, and you will take your pre-procedure medications.  You will be asked to remove everything from the waist down, including undergarments. You will then put on a hospital gown or shorts and blue booties.  Your surgeon will come in to answer any questions and amira any bulging varicose veins to be removed.  You will be taken to the restroom to empty your bladder before going into the procedure room.    In  the Procedure Room  You will be escorted to the procedure room. You will lie on a procedure table covered with a sheet or blanket.  A nurse will put a blood pressure cuff on your arm and a pulse/oxygen monitor on a finger. Your vital signs will be monitored every 15 minutes.  Your gown will be pulled up slightly and the groin exposed for a short period of time. The surgeon's assistant will clean your foot, leg, and groin with an antibacterial solution. We will get you covered up as quickly as possible!  Sterile towels and blue drapes will be used to cover you and the table. You will be asked to keep your hands under the blue drapes during the procedure.  The lights will be turned down. The table will be tipped so your head is higher than your feet. You may feel like you're going to slide off, but you won't.    The Procedure  The surgeon will visualize your veins with an ultrasound machine. He or she will then numb your skin and access the vein. A catheter is passed up the vein and positioned with ultrasound guidance. The table will then be tipped head down.  Once the catheter is in the correct position, medication will be injected to numb your leg. You will feel some needle sticks and may feel discomfort as the medication goes in. Once this is done, you should not experience significant discomfort. But if you do, please let us know and more numbing medication can be injected. As the catheter sends out heat, the vein closes off and the catheter is withdrawn.  For the phlebectomy part of the procedure, small incisions are made where the bulging varicose veins have been marked on your leg(s) and these veins will be removed using a small hector hook instrument.    Post-Procedure  Once the procedure is done, your leg(s) will be washed with warm water and dried. Your leg(s) will be bandaged with large soft dressings and a large ACE bandage wrapped from toes to groin.   You will be offered something to drink and a light  snack.  You will rest with your leg(s) elevated for approximately 30 minutes. Your friend or family member may join you.  For your safety, you will be taken to your car in a wheelchair. If you are able to, it is good to keep your leg(s) elevated on the car ride home.    Post-Procedure Instructions:             VNUS Closure and Phlebectomies      Post-Op Day Zero - The Day of Your Procedure:0  1. Medication for Pain Control and Inflammation Control   - The numbing medication injected during your procedure will last for several hours. The pre-procedure                 tablets may make you very sleepy and you might not remember everything from the procedure or from                 the day. This will usually wear off by the next day.   - Ibuprofen:  If tolerated, take ibuprofen (e.g., Advil) to reduce inflammation whether or not you have                 pain. For three days, take two tablets (200 mg each) with every meal and at bedtime with a snack. If                 your pain is not controlled with ibuprofen, you may take prescription pain medication (such as Norco),                 if prescribed.   - You may resume taking any medications you were taking before your procedure.  2. Activity   - Rest with your leg(s) elevated above your heart. This will prevent from a lot of swelling and                 bleeding. You do not need to elevate your leg(s) while sleeping at night. You may go upstairs, sit up to                 eat, use the bathroom, and take several five-minute walks. Otherwise, keep your leg(s) elevated.                 Minimize the amount of time you are up on your feet to about 30 minutes at a time.  3. Bandages   - The incision sites will be covered with soft bandages and an ACE wrap. Keep your bandages on                 and dry for 48 hours. The ACE should provide  snug  compression but should not cause pain or                 numbness in the toes. If you have significant discomfort or your toes become  cold or numb, unwrap                 your ACE and rewrap with less tension starting at the toes wrapping upward.  4. Incisions   - Bleeding: You may see some incision sites that are oozing through the bandages. This is not unusual      and can be managed with Rest, Ice, Compression and Elevation (RICE). Apply ice and firm pressure      directly to the site that is bleeding and rest with your leg(s) elevated above your heart for 20-30                 minutes.    Post-Op Day One:  1. Medication   - Ibuprofen: Continue the same as the Day of Your Procedure. If your pain is not controlled with                 ibuprofen, you may take prescription pain medication (such as Norco), if prescribed.   2. Activity   - We would like you to get up at least six times and walk around for short periods of time, unless it is      causing you pain. You should not be on your feet more than 90 minutes at a time. Elevate your leg      above your heart when you are not walking.  3. Bandages   - Your bandages must be kept on and dry for 48 hours.  4. Driving   - You may resume driving when you can do so safely. Do not drive if you are taking narcotic pain      medication.  Post-Op Day Two:   1. Medication  - Ibuprofen: Continue the same as the Day of Your Procedure.  2.  Activity  - Walk as tolerated. Elevate as much as possible when not walking.  3. Bandages and Compression  - Remove ACE wrap and padding. Shower and put on your compression hose during waking hours only       for at least 5 days. (Your doctor may instruct you to keep your bandages on until your return     appointment; please follow your doctor's instructions.)  4. Incisions  - Your leg(s) will be bruised; there may be swelling, hard knots under the skin and possibly some     numbness. These will likely resolve over time. If you see  hair-like  strings coming out of your     incisions, do not pull them (this will only cause pain/discomfort). We will trim them when you come      back for your follow-up appointment.  5. Call Us If:   - You see any areas on your leg that are red and angry in appearance.   - You notice any drainage that is milky or cloudy in appearance or that has a foul odor.   - You run a temperature of 100.5 or greater.    Post-Op Day Three:  You will have a follow up appointment 2-4 days post-procedure. At this appointment, you will have an ultrasound and we will check your incisions.    ________________________________________________________________________________________    The Two Weeks Following Your Procedure  1.  Skin Care   - Do not use any lotions, creams or powders on your incisions for 14 days or until the incisions have                 healed.   - Do not soak in a bathtub, hot tub or go swimming for 14 days or until your incisions have healed.  2.  Medications   - You may use ibuprofen or acetaminophen (e.g., Tylenol) as needed for pain or discomfort.  3.  Activity   - Do not lift over 25 pounds. After about two weeks you may resume exercise such as aerobics,                 running, tennis or weightlifting. Use your common sense and ease back into your exercise routine                 slowly.   - You may feel a cord-like tightness along the inside of your leg. Gentle stretching can be helpful.  4. Compression Hose   - Your doctor may instruct you to wear compression for longer than seven days; please follow your                 doctor's instructions. As a comfort measure, you may choose to wear compression for longer than                 required.  5.  Travel   - Do not fly in an airplane for 14 days after your procedure. If you have a long car trip planned within                 two to three weeks following your procedure, stop and walk for a few minutes every two hours.      Periodic ankle pumps during the ride may be helpful.    Six Week Appointment  - At your six-week appointment, you will see your surgeon for an exam and evaluation. This office visit      will be scheduled when you return for post-op day three return appointment.       Return to Work  1.  If you work outside the home, you may return to work in a few days depending on the extent of your        procedure, how you tolerate it, and the type of work you perform.  2.  Paperwork: If your employer requires paperwork or you would like a letter written to your employer, please        let us know. We will complete disability type forms at no charge. Please allow five business days for forms        to be completed.        Thigh High, medical grade, 20-30 mmHg strength, compression stockings are recommended. .    Options for purchasing compression stockings:    You can call your insurance company and ask if compression stockings are covered.  They usually fall under your Durable Medical Equipment (DME) coverage, if covered. The DME codes if they ask are: Knee high , Thigh high , Panty .   Be sure to ask if you need a specific diagnosis for coverage, if your deductible needs to be met first, how many pairs are covered and how often.  If insurance covers and you would like to order from [facility], or another medical supply company: call the clinic back and we can fax a prescription to your medical supply company of choice. They will bill your insurance and ship them to you. We will ask you color choice (black or beige), and toe choice (open or closed toe).    We sell many sizes in our clinic (except specialty order or petite sizing). We can check to see if we have your size.  Knee high (Mediven brand) are $60/pair  Thigh high (Mediven brand) are $85/pair.   If you would like to purchase from the clinic, let us know including your color choice (black or beige), and toe choice (open or closed toe), and we will set them aside for you to  and pay for at your next clinic appointment or the day of your procedure.    Online website ordering options:    [email or web address] has many options available.

## 2024-08-21 NOTE — PROGRESS NOTES
August 21, 2024    Vein Procedure Recommendation    Called and spoke with patient.    Dr. Curiel has recommended patient to have the following vein procedure(s):     1. Right leg VNUS closure GSV, ASV, 1 perf and 20-30 Phlebectomies (medically necessary)    2. Left leg VNUS closure GSV, ASV, SSV and 20-30 Phlebectomies (medically necessary)      Patient Pre-op Questions:  Preferred Pharmacy: [facility] in [place]  Anticoagulant/ASA: No  Artificial Joint or Heart Valve:  No  Open ulcer:  No  Sedation nausea: No      Patient is recommended to wear Thigh High compression hose following her procedure. Discussed compression hose. Explained to patient if insurance doesn't cover the compression hose there are a couple different options to getting them and to call the clinic to let us know if they need help. **PT LOOKING INTO GETTING COMPRESSION HOSE** Emailed hose order form to patient at [email or web address]    Entered in patient's After Visit Summary (viewable in [employer]) written procedure instructions to review on her own (see After Visit Summary).    Next steps:    Insurance Submission  Informed patient this process could take up to 14 business days, but once approved, the patient will be contacted by our surgery scheduler to schedule the above procedure. Gave patient our surgery scheduler's information.    Informed patient to call our office regarding the status of their insurance authorization if it has been more than 3 weeks and have not heard from our surgery scheduler.    Patient is in agreement with all of the above and has no further questions at this time.    Laura Amanda RN  [facility]  Vein Clinic

## 2024-08-30 NOTE — PROGRESS NOTES
Policy active, no pa required for outpatient.     Bethany reviewed patients plan and medical necessity and was determined it does meet the medical criteria and approved to schedule following procedures RT GSV, phlebs,  LT GSV, SSV Phlebs.      Medical Criteria needed to be met  Vein size of GSV 7.5  Vein Size of Phlebs 4.0     Bethany reviewed patients plan and medical necessity and was determined RT ASV,perf, LT ASV  does not meet the medical criteria. GSV needs to be treated first.     RENA DAVIS,   [facility]

## 2024-09-25 ENCOUNTER — TELEPHONE (OUTPATIENT)
Dept: VASCULAR SURGERY | Facility: CLINIC | Age: 34
End: 2024-09-25
Payer: COMMERCIAL

## 2024-09-25 DIAGNOSIS — I83.813 VARICOSE VEINS OF BILATERAL LOWER EXTREMITIES WITH PAIN: Primary | ICD-10-CM

## 2024-09-25 RX ORDER — LORAZEPAM 1 MG/1
TABLET ORAL
Qty: 6 TABLET | Refills: 0 | Status: SHIPPED | OUTPATIENT
Start: 2024-09-25

## 2024-09-25 RX ORDER — CLONIDINE HYDROCHLORIDE 0.1 MG/1
TABLET ORAL
Qty: 2 TABLET | Refills: 0 | Status: SHIPPED | OUTPATIENT
Start: 2024-09-25

## 2024-09-25 NOTE — TELEPHONE ENCOUNTER
9/25/2024    Vein Clinic Preoperative Nurse Call    Procedure: Right leg VNUS closure GSV,(med nec), 20-30 stab phlebs(med nec) DENIED 1 PERF,ASV,   Date: 10/2/24  Surgeon: Dr. Curiel  Time: 1300  Check in time: 1200    Called patient and left a detailed message. Informed patient: when to check in (1200) to sign consent, to bring their preop medications in their original bottle with them (3mg ativan, 0.1mg clonidine). Patient will take the medications after signing the consent to the procedure. Instructed patient to wear loose-fitting comfortable clothing, and bring their compression hose. Ensured patient has a /someone that will be responsible for them the rest of the day. Once procedure is completed, we will keep patient in recovery for 30-45 mins, and call  with aftercare instructions. Informed patient, that if possible, they should sit in the backseat to elevate their leg on the ride home.    Pt needs Thigh High compression hose for procedure. Status of the hose: patient instructed to call back and update on hose status.    Special instructions: 2 sets of medications being sent to pharmacy. Second procedure 10/23/24: Left GSV, SSV, and phlebs     Informed patient that if she is still breastfeeding, the sedation medications will impact that. Encouraged her to call to discuss. Patient will need to pump and dump for 24 hours following procedure.     Patient understands if they have any of the following symptoms (fever, cough, shortness of breath, rash), they need to notify us immediately as they may need to cancel their procedure and reschedule for a later date.    Gave patient our call back number if any further questions or concerns.    Laura Amanda RN  [facility]

## 2024-09-27 ENCOUNTER — MYC MEDICAL ADVICE (OUTPATIENT)
Dept: OBGYN | Facility: OTHER | Age: 34
End: 2024-09-27
Payer: COMMERCIAL

## 2024-09-27 NOTE — TELEPHONE ENCOUNTER
Called and spoke with patient.  Patient never did get an RX for breast pump for this baby delivered 5/15/2024.    Breast pump prescription provided and faxed to [facility].     Ann PLATA RN -  OB/GYN group

## 2024-09-27 NOTE — TELEPHONE ENCOUNTER
Artiet received from patient asking to fax Rx for breast pump to [facility].      Patient had an  on 5/15/2024     Patient last seen 2024 for postpartum follow up.

## 2024-10-02 ENCOUNTER — OFFICE VISIT (OUTPATIENT)
Dept: VASCULAR SURGERY | Facility: CLINIC | Age: 34
End: 2024-10-02
Payer: COMMERCIAL

## 2024-10-02 ENCOUNTER — ALLIED HEALTH/NURSE VISIT (OUTPATIENT)
Dept: VASCULAR SURGERY | Facility: CLINIC | Age: 34
End: 2024-10-02
Payer: COMMERCIAL

## 2024-10-02 VITALS — DIASTOLIC BLOOD PRESSURE: 67 MMHG | HEART RATE: 89 BPM | OXYGEN SATURATION: 95 % | SYSTOLIC BLOOD PRESSURE: 95 MMHG

## 2024-10-02 DIAGNOSIS — I83.813 VARICOSE VEINS OF BILATERAL LOWER EXTREMITIES WITH PAIN: Primary | ICD-10-CM

## 2024-10-02 DIAGNOSIS — I83.893 VARICOSE VEINS OF LEG WITH EDEMA, BILATERAL: ICD-10-CM

## 2024-10-02 PROCEDURE — 99207 PR NO CHARGE NURSE ONLY: CPT

## 2024-10-02 PROCEDURE — A6533 GC STOCKING THIGHLNGTH 18-30: HCPCS

## 2024-10-02 PROCEDURE — 36475 ENDOVENOUS RF 1ST VEIN: CPT | Mod: RT | Performed by: SPECIALIST

## 2024-10-02 PROCEDURE — 37766 PHLEB VEINS - EXTREM 20+: CPT | Mod: RT | Performed by: SPECIALIST

## 2024-10-02 NOTE — PROGRESS NOTES
Pre-procedure Nursing Note    Binta Peterson presents to clinic for Vein Procedure  .   /Person Responsible for Patient: Rex (Spouse)  Phone Number: [phone]    Prophylactic Medication:N/A   Sedation Medication: Ativan, 3mg ,   Time Taken: 1209 and Clonidine, 0.1mg,   Time Taken: 1209  Compression Stockings: Patient purchased from clinic today.  The procedure is being performed on RLE.  Patient understanding of procedure matches consent? YES    Patient's pre-procedure medications verified by AF.    Laura Amanda RN on 10/2/2024 at 12:15 PM

## 2024-10-02 NOTE — PROGRESS NOTES
Patient purchased ONE pair(s) of Beige, Thigh High, Open Toe, size 3 compression hose from the clinic today.     Informed patient all compression hose purchases are final.    Laura Amanda RN on 10/2/2024 at 1:18 PM

## 2024-10-02 NOTE — PROGRESS NOTES
[facility] Procedure Note    Binta Peterson  2024    Binta Peterson is a 34 year old year old female. She presents for Vein Procedure  .    BP 95/67   Pulse 89   SpO2 95%         10/2/2024     1:12 PM   Flowsheet Data   Procedure Start Time: 13:12   Prep: Chloraprep   Side: Right   Tx Length (cm): RIGHT GSV: 30   Junction (cm): RIGHT GSV: 2.53   RF Cycles: RIGHT GSV: 7   RF TX Time (Minutes): RIGHT GSV: 2:20   # PHLEB Sites: RIGHT LE   Sedation taken: Yes   Pre Pt. Physical / Cognitive Limitations: WNL   TOTAL Local anesthesia Injected (ml): 4   Max Volume Local Anesthesia (ml): 11   TOTAL Tumescent Injected volume (ml): 325   Max Volume Tumescent (ml): 572   Post Pt. Physical / Cognitive Limitations: WNL   D/C Instructions given, states readiness to leave and escorted to car: Yes       Venus Closure    Date/Time: 10/2/2024 2:37 PM    Performed by: Wai Curiel MD  Authorized by: Wai Curiel MD    Time out: Immediately prior to the procedure a time out was called    Preparation: Patient was prepped and draped in usual sterile fashion    1st Assist:  Manjula Snider CST/BIPIN    Circulator:  Laura Amanda RN    Procedure:  VNUS  Procedure side:  Right  One Vein    Vein Treated:  GSV  Patient tolerance:  Patient tolerated the procedure well with no immediate complications  Wrap/Hose:  Wraps  Phlebectomy    Date/Time: 10/2/2024 2:37 PM    Performed by: Wai Curiel MD  Authorized by: Wai Curiel MD    Time out: Immediately prior to the procedure a time out was called    Preparation: Patient was prepped and draped in usual sterile fashion    1st Assist:  Manjula Snider CST/BIPIN    Circulator:  Laura Amanda RN    Procedure:  Phlebectomies  Type:  Medically Necessary  Procedure side:  Right  Stabs:  >20  Patient tolerance:  Patient tolerated the procedure well with no immediate complications  Wrap/Hose:  Wraps    Details of procedure:  With the cooperation the  patient in the preoperative holding of the right lower extremity was marked as well as the areas for stab phlebectomy.  She was taken the procedure room and the right lower extremity was prepped and draped in a sterile fashion.  A timeout performed confirming the identity of the patient as well as the procedure be performed.  The greater saphenous vein was mapped over its entire length and an access point was chosen at the distal thigh.  The vein was accessed just above the knee with a micropuncture needle and a 6 [place] sheath.  The catheter was then passed up the vein and the tip was positioned 2.53 cm in the saphenofemoral junction.  Tumescent solution was placed around the vein.  Radiofrequency ablation was then carried out in 7 cm segments.  The catheter and sheath were removed.  We then turned our attention to the areas marked for stab phlebectomy.  They are infiltrated the local anesthetic.  After adequate analgesia was achieved multiple stabs were made with an ophthalmic blade and the veins were removed with a combination of hector hook and mosquito clamps.  This was done for a total of 67 stabs.  Sterile dressings were applied and the patient taken from the procedure area back to holding in stable condition to be sent home.      Wai Curiel MD, FACS

## 2024-10-02 NOTE — LETTER
10/2/2024      Binta Peterson  [address] [facility] [address]      Dear Colleague,    Thank you for referring your patient, Binta Peterson, to the [facility] [place]. Please see a copy of my visit note below.    Pre-procedure Nursing Note    Binta Peterson presents to clinic for Vein Procedure  .   /Person Responsible for Patient: Rex (Spouse)  Phone Number: [phone]    Prophylactic Medication:N/A   Sedation Medication: Ativan, 3mg ,   Time Taken: 1209 and Clonidine, 0.1mg,   Time Taken: 1209  Compression Stockings: Patient purchased from clinic today.  The procedure is being performed on RLE.  Patient understanding of procedure matches consent? YES    Patient's pre-procedure medications verified by AF.    Laura Amanda RN on 10/2/2024 at 12:15 PM        [facility] Procedure Note    Binta Peterson  2024    Binta Peterson is a 34 year old year old female. She presents for Vein Procedure  .    BP 95/67   Pulse 89   SpO2 95%         10/2/2024     1:12 PM   Flowsheet Data   Procedure Start Time: 13:12   Prep: Chloraprep   Side: Right   Tx Length (cm): RIGHT GSV: 30   Junction (cm): RIGHT GSV: 2.53   RF Cycles: RIGHT GSV: 7   RF TX Time (Minutes): RIGHT GSV: 2:20   # PHLEB Sites: RIGHT LE   Sedation taken: Yes   Pre Pt. Physical / Cognitive Limitations: WNL   TOTAL Local anesthesia Injected (ml): 4   Max Volume Local Anesthesia (ml): 11   TOTAL Tumescent Injected volume (ml): 325   Max Volume Tumescent (ml): 572   Post Pt. Physical / Cognitive Limitations: WNL   D/C Instructions given, states readiness to leave and escorted to car: Yes       Venus Closure    Date/Time: 10/2/2024 2:37 PM    Performed by: Wai Curiel MD  Authorized by: Wai Curiel MD    Time out: Immediately prior to the procedure a time out was called    Preparation: Patient was prepped and draped in usual sterile fashion    1st Assist:  Manjula Snider,  MYRTLE/BIPIN    Circulator:  Laura Amanda RN    Procedure:  VNUS  Procedure side:  Right  One Vein    Vein Treated:  GSV  Patient tolerance:  Patient tolerated the procedure well with no immediate complications  Wrap/Hose:  Wraps  Phlebectomy    Date/Time: 10/2/2024 2:37 PM    Performed by: Wai Curiel MD  Authorized by: Wai Curiel MD    Time out: Immediately prior to the procedure a time out was called    Preparation: Patient was prepped and draped in usual sterile fashion    1st Assist:  Manjula Snider, MYRTLE/CSFA    Circulator:  Laura Amanda RN    Procedure:  Phlebectomies  Type:  Medically Necessary  Procedure side:  Right  Stabs:  >20  Patient tolerance:  Patient tolerated the procedure well with no immediate complications  Wrap/Hose:  Wraps    Details of procedure:  With the cooperation the patient in the preoperative holding of the right lower extremity was marked as well as the areas for stab phlebectomy.  She was taken the procedure room and the right lower extremity was prepped and draped in a sterile fashion.  A timeout performed confirming the identity of the patient as well as the procedure be performed.  The greater saphenous vein was mapped over its entire length and an access point was chosen at the distal thigh.  The vein was accessed just above the knee with a micropuncture needle and a 6 [place]ralt[place] sheath.  The catheter was then passed up the vein and the tip was positioned 2.53 cm in the saphenofemoral junction.  Tumescent solution was placed around the vein.  Radiofrequency ablation was then carried out in 7 cm segments.  The catheter and sheath were removed.  We then turned our attention to the areas marked for stab phlebectomy.  They are infiltrated the local anesthetic.  After adequate analgesia was achieved multiple stabs were made with an ophthalmic blade and the veins were removed with a combination of hector hook and mosquito clamps.  This was done for a total of 67 stabs.   Sterile dressings were applied and the patient taken from the procedure area back to holding in stable condition to be sent home.      Wai Curiel MD, FACS      Again, thank you for allowing me to participate in the care of your patient.        Sincerely,        Wai Curiel MD

## 2024-10-04 ENCOUNTER — ANCILLARY PROCEDURE (OUTPATIENT)
Dept: ULTRASOUND IMAGING | Facility: CLINIC | Age: 34
End: 2024-10-04
Payer: COMMERCIAL

## 2024-10-04 ENCOUNTER — ALLIED HEALTH/NURSE VISIT (OUTPATIENT)
Dept: VASCULAR SURGERY | Facility: CLINIC | Age: 34
End: 2024-10-04
Payer: COMMERCIAL

## 2024-10-04 DIAGNOSIS — I83.813 VARICOSE VEINS OF BILATERAL LOWER EXTREMITIES WITH PAIN: Primary | ICD-10-CM

## 2024-10-04 DIAGNOSIS — I83.813 VARICOSE VEINS OF BILATERAL LOWER EXTREMITIES WITH PAIN: ICD-10-CM

## 2024-10-04 DIAGNOSIS — Z09 POSTOP CHECK: Primary | ICD-10-CM

## 2024-10-04 PROCEDURE — 99207 PR SC UNLISTED SUPPLY OPNP: CPT

## 2024-10-04 PROCEDURE — 93971 EXTREMITY STUDY: CPT | Mod: RT | Performed by: SPECIALIST

## 2024-10-04 PROCEDURE — 99207 PR NO CHARGE NURSE ONLY: CPT

## 2024-10-04 NOTE — PROGRESS NOTES
October 4, 2024    Vein Clinic Postoperative Nurse Note    Patient is here for her 48 hour postoperative visit.    Procedure:  Right leg VNUS closure GSV,(med nec), 20-30 stab phlebs(med nec)   Procedure Date: 10/2/24  Surgeon: Dr. Curiel    Ultrasound Result: Right lower extremity negative for DVT. Right GSV is closed 17.5mm from SFJ to distal thigh    Physical Exam: Incisions are approximated without signs of infection.  Ecchymosis: severe bruising noted throughout leg  Swelling: none noted  Paresthesia: patient denies    Patient Questions or Concerns: none at this time    Helped patient don compression hose.     Reviewed postoperative instructions with patient and provided her with written material of common things to expect from her procedure.    Patient's Next Vein Clinic Appointment: 2nd procedure scheduled 10/23/24    Laura Amanda RN  [facility]

## 2024-10-16 ENCOUNTER — TELEPHONE (OUTPATIENT)
Dept: VASCULAR SURGERY | Facility: CLINIC | Age: 34
End: 2024-10-16
Payer: COMMERCIAL

## 2024-10-16 NOTE — TELEPHONE ENCOUNTER
10/16/2024    Vein Clinic Preoperative Nurse Call    Procedure: Left leg VNUS closure GSV, SSV, (med nec), 20-30 stab phlebs(med nec) DENIED ASV   Date: 10/23/24  Surgeon: Dr. Curiel  Time: 1300  Check in time: 1200    Called patient and left a detailed message. Informed patient: when to check in (1200) to sign consent, to bring their preop medications in their original bottle with them (3mg ativan, 0.1mg clonidine). Patient will take the medications after signing the consent to the procedure. Instructed patient to wear loose-fitting comfortable clothing, and bring their compression hose. Ensured patient has a /someone that will be responsible for them the rest of the day. Once procedure is completed, we will keep patient in recovery for 30-45 mins, and call  with aftercare instructions. Informed patient, that if possible, they should sit in the backseat to elevate their leg on the ride home.    Pt needs Thigh High compression hose for procedure. Status of the hose: patient has hose.     Special instructions: Patient has hose and meds from first procedure 10/2/24.     Patient understands if they have any of the following symptoms (fever, cough, shortness of breath, rash), they need to notify us immediately as they may need to cancel their procedure and reschedule for a later date.    Gave patient our call back number if any further questions or concerns.    Laura Amanda RN  [facility]

## 2024-10-23 ENCOUNTER — OFFICE VISIT (OUTPATIENT)
Dept: VASCULAR SURGERY | Facility: CLINIC | Age: 34
End: 2024-10-23
Payer: COMMERCIAL

## 2024-10-23 VITALS — HEART RATE: 89 BPM | OXYGEN SATURATION: 93 % | SYSTOLIC BLOOD PRESSURE: 102 MMHG | DIASTOLIC BLOOD PRESSURE: 68 MMHG

## 2024-10-23 DIAGNOSIS — I83.813 VARICOSE VEINS OF BILATERAL LOWER EXTREMITIES WITH PAIN: Primary | ICD-10-CM

## 2024-10-23 DIAGNOSIS — I83.893 VARICOSE VEINS OF LEG WITH EDEMA, BILATERAL: ICD-10-CM

## 2024-10-23 PROCEDURE — 36475 ENDOVENOUS RF 1ST VEIN: CPT | Mod: LT | Performed by: SPECIALIST

## 2024-10-23 PROCEDURE — 36476 ENDOVENOUS RF VEIN ADD-ON: CPT | Mod: LT | Performed by: SPECIALIST

## 2024-10-23 PROCEDURE — 37766 PHLEB VEINS - EXTREM 20+: CPT | Mod: LT | Performed by: SPECIALIST

## 2024-10-23 NOTE — PROGRESS NOTES
[facility] Procedure Note    Binta Peterson  2024    Binta Peterson is a 34 year old year old female. She presents for Vein Procedure  .    /68   Pulse 89   SpO2 93%         10/23/2024     1:10 PM   Flowsheet Data   Procedure Start Time: 13:10   Prep: Chloraprep   Side: Left   Tx Length (cm): LEFT GSV: 37   Junction (cm): LEFT GSV: 2.26   RF Cycles: LEFT GSV: 7   RF TX Time (Minutes): LEFT GSV: 2:20   How many additional vein treatments are being performed? 1   Tx Length (cm) - 2: LEFT SSV: 25   Junction 2 (cm): LEFT SSV: NO JUNCTION   RF Cycles 2 : LEFT SSV: 5   RF TX Time (Minutes) 2: LEFT SSV: 1:40   # PHLEB Sites: LEFT LE   Sedation taken: Yes   Pre Pt. Physical / Cognitive Limitations: WNL   TOTAL Local anesthesia Injected (ml): 9   Max Volume Local Anesthesia (ml): 11   TOTAL Tumescent Injected volume (ml): 275   Max Volume Tumescent (ml): 572   Post Pt. Physical / Cognitive Limitations: WNL   Procedure End Time: 14:15   D/C Instructions given, states readiness to leave and escorted to car: Yes       Venus Closure    Date/Time: 10/23/2024 3:18 PM    Performed by: Wai Curiel MD  Authorized by: Wai Curiel MD    Time out: Immediately prior to the procedure a time out was called    Preparation: Patient was prepped and draped in usual sterile fashion    1st Assist:  Manjula Snider CST/BIPIN    Circulator:  Laura Amanda RN    Procedure:  VNUS  Procedure side:  Left  One Vein    Vein Treated:  GSV  Patient tolerance:  Patient tolerated the procedure well with no immediate complications  Wrap/Hose:  Wraps  [place] Closure    Date/Time: 10/23/2024 3:19 PM    Performed by: Wai Curiel MD  Authorized by: Wai Curiel MD    Time out: Immediately prior to the procedure a time out was called    Preparation: Patient was prepped and draped in usual sterile fashion    1st Assist:  Manjula Snider CST/BIPIN    Circulator:  Laura Amanda RN    Procedure:   VNUS  Procedure side:  Left  Second and Subsequent Vein    Vein Treated:  SSV  Wrap/Hose:  Wraps  Phlebectomy    Date/Time: 10/23/2024 3:19 PM    Performed by: Wai Curiel MD  Authorized by: Wai Curiel MD    Time out: Immediately prior to the procedure a time out was called    Preparation: Patient was prepped and draped in usual sterile fashion    1st Assist:  Manjula Snider, CST/CSFA    Procedure:  Phlebectomies  Type:  Medically Necessary  Procedure side:  Left  Stabs:  >20  Patient tolerance:  Patient tolerated the procedure well with no immediate complications  Wrap/Hose:  Wraps      Details of procedure:  With the cooperation the patient in the preop holding where the left lower lower extremities marked as well as the areas for stab phlebectomy.  She was taken to the procedure and the left lower extremities prepped and draped in a sterile fashion.  A timeout was performed confirm the identity of the patient as well as the procedure be performed.  The greater saphenous vein was mapped over its entire length and an access point was chosen in the distal thigh.  The vein was then accessed with a micropuncture needle and a 6 Taiwanese sheath.  The catheter was then passed up the vein and placed 2.26 cm in the saphenofemoral junction.  Tumescent solution was placed around the vein and radiofrequency ablation was carried out in 7 cm segments.  We then turned our attention to the areas for anterior phlebectomies.  They are infiltrated local anesthetic and multiple veins were removed with a combination of hector hook and mosquito clamps.  The patient was then placed in the prone position and the small saphenous vein was then mapped over its entire length and an access point was chosen in the distal calf.  The vein was accessed under ultrasound guidance with a micropuncture needle and a 7 Taiwanese sheath.  The catheter was then passed up the vein to just the level of the knee.  There was no communication with  the popliteal vein.  Tumescent solution was placed around the vein and radiofrequency ablation was carried out in 7 cm segments.  There are also some areas marked for stab phlebectomy on the posterior aspect of the.  These were infiltrated local anesthetic and the veins were then removed with a combination of hector hook and mosquito clamps.  This was done for a total of 28 stabs anterior and posterior of the leg.  Sterile dressings were applied and the patient was then taken from the procedure room to the recovery in stable condition to be sent home.      Wai Curiel MD, FACS

## 2024-10-23 NOTE — LETTER
10/23/2024      Binta Peterson  [address] [facility] [address]      Dear Colleague,    Thank you for referring your patient, Binta Peterson, to the [facility] [place]. Please see a copy of my visit note below.    Pre-procedure Nursing Note    Binta Peterson presents to clinic for Vein Procedure  .   /Person Responsible for Patient: Rex (Spouse)  Phone Number: [phone]    Prophylactic Medication:N/A   Sedation Medication: Ativan, 3mg ,   Time Taken: 1220 and Clonidine, 0.1mg,   Time Taken: 1220  Compression Stockings: Patient left hose at home.  The procedure is being performed on LLE.  Patient understanding of procedure matches consent? YES    Patient's pre-procedure medications verified by AF.    Laura Amanda RN on 10/23/2024 at 12:26 PM        [facility] Procedure Note    Binta Peterson  2024    Binta Peterson is a 34 year old year old female. She presents for Vein Procedure  .    /68   Pulse 89   SpO2 93%         10/23/2024     1:10 PM   Flowsheet Data   Procedure Start Time: 13:10   Prep: Chloraprep   Side: Left   Tx Length (cm): LEFT GSV: 37   Junction (cm): LEFT GSV: 2.26   RF Cycles: LEFT GSV: 7   RF TX Time (Minutes): LEFT GSV: 2:20   How many additional vein treatments are being performed? 1   Tx Length (cm) - 2: LEFT SSV: 25   Junction 2 (cm): LEFT SSV: NO JUNCTION   RF Cycles 2 : LEFT SSV: 5   RF TX Time (Minutes) 2: LEFT SSV: 1:40   # PHLEB Sites: LEFT LE   Sedation taken: Yes   Pre Pt. Physical / Cognitive Limitations: WNL   TOTAL Local anesthesia Injected (ml): 9   Max Volume Local Anesthesia (ml): 11   TOTAL Tumescent Injected volume (ml): 275   Max Volume Tumescent (ml): 572   Post Pt. Physical / Cognitive Limitations: WNL   Procedure End Time: 14:15   D/C Instructions given, states readiness to leave and escorted to car: Yes       Venus Closure    Date/Time: 10/23/2024 3:18 PM    Performed by: Wai Curiel  MD  Authorized by: Wai Curiel MD    Time out: Immediately prior to the procedure a time out was called    Preparation: Patient was prepped and draped in usual sterile fashion    1st Assist:  ELIZABETH Bosch    Circulator:  Laura Amanda RN    Procedure:  VNUS  Procedure side:  Left  One Vein    Vein Treated:  GSV  Patient tolerance:  Patient tolerated the procedure well with no immediate complications  Wrap/Hose:  Wraps  [place] Closure    Date/Time: 10/23/2024 3:19 PM    Performed by: Wai Curiel MD  Authorized by: Wai Curiel MD    Time out: Immediately prior to the procedure a time out was called    Preparation: Patient was prepped and draped in usual sterile fashion    1st Assist:  ELIZABETH Bosch    Circulator:  Laura Amanda RN    Procedure:  VNUS  Procedure side:  Left  Second and Subsequent Vein    Vein Treated:  SSV  Wrap/Hose:  Wraps  Phlebectomy    Date/Time: 10/23/2024 3:19 PM    Performed by: Wai Curiel MD  Authorized by: Wai Curiel MD    Time out: Immediately prior to the procedure a time out was called    Preparation: Patient was prepped and draped in usual sterile fashion    1st Assist:  ELIZABETH Bosch    Procedure:  Phlebectomies  Type:  Medically Necessary  Procedure side:  Left  Stabs:  >20  Patient tolerance:  Patient tolerated the procedure well with no immediate complications  Wrap/Hose:  Wraps      Details of procedure:  With the cooperation the patient in the preop holding where the left lower lower extremities marked as well as the areas for stab phlebectomy.  She was taken to the procedure and the left lower extremities prepped and draped in a sterile fashion.  A timeout was performed confirm the identity of the patient as well as the procedure be performed.  The greater saphenous vein was mapped over its entire length and an access point was chosen in the distal thigh.  The vein was then accessed with a micropuncture needle  and a 6 Sami sheath.  The catheter was then passed up the vein and placed 2.26 cm in the saphenofemoral junction.  Tumescent solution was placed around the vein and radiofrequency ablation was carried out in 7 cm segments.  We then turned our attention to the areas for anterior phlebectomies.  They are infiltrated local anesthetic and multiple veins were removed with a combination of hector hook and mosquito clamps.  The patient was then placed in the prone position and the small saphenous vein was then mapped over its entire length and an access point was chosen in the distal calf.  The vein was accessed under ultrasound guidance with a micropuncture needle and a 7 Sami sheath.  The catheter was then passed up the vein to just the level of the knee.  There was no communication with the popliteal vein.  Tumescent solution was placed around the vein and radiofrequency ablation was carried out in 7 cm segments.  There are also some areas marked for stab phlebectomy on the posterior aspect of the.  These were infiltrated local anesthetic and the veins were then removed with a combination of hector hook and mosquito clamps.  This was done for a total of 28 stabs anterior and posterior of the leg.  Sterile dressings were applied and the patient was then taken from the procedure room to the recovery in stable condition to be sent home.      Wai Curiel MD, FACS      Again, thank you for allowing me to participate in the care of your patient.        Sincerely,        Wai Curiel MD

## 2024-10-23 NOTE — PROGRESS NOTES
Pre-procedure Nursing Note    Binta Peterson presents to clinic for Vein Procedure  .   /Person Responsible for Patient: Rex (Spouse)  Phone Number: [phone]    Prophylactic Medication:N/A   Sedation Medication: Ativan, 3mg ,   Time Taken: 1220 and Clonidine, 0.1mg,   Time Taken: 1220  Compression Stockings: Patient left hose at home.  The procedure is being performed on LLE.  Patient understanding of procedure matches consent? YES    Patient's pre-procedure medications verified by AF.    Laura Amanda RN on 10/23/2024 at 12:26 PM

## 2024-10-25 ENCOUNTER — ALLIED HEALTH/NURSE VISIT (OUTPATIENT)
Dept: VASCULAR SURGERY | Facility: CLINIC | Age: 34
End: 2024-10-25
Payer: COMMERCIAL

## 2024-10-25 ENCOUNTER — ANCILLARY PROCEDURE (OUTPATIENT)
Dept: ULTRASOUND IMAGING | Facility: CLINIC | Age: 34
End: 2024-10-25
Payer: COMMERCIAL

## 2024-10-25 DIAGNOSIS — Z09 POSTOP CHECK: Primary | ICD-10-CM

## 2024-10-25 DIAGNOSIS — I83.813 VARICOSE VEINS OF BILATERAL LOWER EXTREMITIES WITH PAIN: ICD-10-CM

## 2024-10-25 PROCEDURE — 99207 PR NO CHARGE NURSE ONLY: CPT

## 2024-10-25 PROCEDURE — 93971 EXTREMITY STUDY: CPT | Mod: LT | Performed by: SPECIALIST

## 2024-10-25 NOTE — PROGRESS NOTES
October 25, 2024    Vein Clinic Postoperative Nurse Note    Patient is here for her 72 hour postoperative visit.    Procedure: Left leg VNUS closure GSV, SSV, (med nec),  stab phlebs(med nec)  Procedure Date: 10/23/24  Surgeon: Dr. Curiel    Ultrasound Result: Negative for LLE DVT, GSV is closed 13.1 mm from SFJ to distal thigh. SSV closed posterior knee to distal calf.    Physical Exam: Incisions are approximated without signs of infection.  Ecchymosis: moderate bruising to phleb sites and access sites  Swelling: minimal swelling  Paresthesia: patient denies    Patient Questions or Concerns: no concerns    Reviewed postoperative instructions with patient and provided her with written material of common things to expect from her procedure.    Assisted patient to don thigh high compression    Patient's Next Vein Clinic Appointment: 12/10 for 6 week follow up for both legs    Domingo Mcdonough RN  [facility]

## 2024-11-22 ENCOUNTER — MEDICAL CORRESPONDENCE (OUTPATIENT)
Dept: HEALTH INFORMATION MANAGEMENT | Facility: CLINIC | Age: 34
End: 2024-11-22
Payer: COMMERCIAL

## 2024-12-10 ENCOUNTER — OFFICE VISIT (OUTPATIENT)
Dept: VASCULAR SURGERY | Facility: CLINIC | Age: 34
End: 2024-12-10
Payer: COMMERCIAL

## 2024-12-10 DIAGNOSIS — I83.811 VARICOSE VEINS OF LEG WITH PAIN, RIGHT: ICD-10-CM

## 2024-12-10 DIAGNOSIS — I83.893 SYMPTOMATIC VARICOSE VEINS OF BOTH LOWER EXTREMITIES: ICD-10-CM

## 2024-12-10 DIAGNOSIS — Z09 POSTOP CHECK: Primary | ICD-10-CM

## 2024-12-10 PROCEDURE — 99207 PR VEINSOLUTIONS POST OPERATIVE VISIT: CPT | Performed by: SPECIALIST

## 2024-12-10 NOTE — PROGRESS NOTES
December 10, 2024    Vein Procedure Recommendation    Spoke with patient in clinic.    Dr. Curiel has recommended patient to have the following vein procedure(s):     1. Right leg  20-30 Phlebectomies (medically necessary)        Patient Pre-op Questions:  Preferred Pharmacy: [facility] in [place]  Anticoagulant/ASA: No  Artificial Joint or Heart Valve:  No  Open ulcer:  No  Sedation nausea: No      Compression hose are not required following this procedure. Patient does have thigh high compression from previous procedure, if wanting to wear them.     Handed patient written procedure instructions to review on her own (see After Visit Summary).    Next steps:    Insurance Submission  Informed patient this process could take up to 14 business days, but once approved, the patient will be contacted by our surgery scheduler to schedule the above procedure. Gave patient our surgery scheduler's information.    Informed patient to call our office regarding the status of their insurance authorization if it has been more than 3 weeks and have not heard from our surgery scheduler.    Patient is in agreement with all of the above and has no further questions at this time.    Laura Amanda RN  [facility]  Vein Clinic

## 2024-12-10 NOTE — PATIENT INSTRUCTIONS
Laura Bautista, Surgery Scheduler - [phone].    Procedure Plan: 1. Right leg  20-30 Phlebectomies (medically necessary)      Please call our office regarding the status of your insurance authorization if it has been more than 3 weeks and you have not heard from our surgery scheduler.      Pre-Procedure Instructions:                       Phlebectomies  You are having Phlebectomies, where one or more of your veins are removed.  Insurance  If your procedure was deemed medically necessary, we will call your insurance company to verify benefits.    Your Current Medications and Allergies  To reduce bruising, please do not take aspirin-type medications (Motrin, Aleve, Ibuprofen, Advil, etc.) for three days before your procedure. You may take Tylenol if you need a pain reliever.  Are you on blood thinner medications? (Plavix, Coumadin, Eliquis, Xarelto) Please discuss this with your surgeon. You may resume taking your blood thinner medication after your procedure.  Are you sensitive to latex or adhesives used for fake fingernails? Please let us know!    Driving Escort and   Please arrange to have a trusted adult (18 years old or older) drive you to and from the clinic.  For your safety, we recommend you have a trusted adult stay with you until the next morning.    Your Health  If you have a change in your health before the procedure, contact our office immediately.  (For example: cold symptoms, cough, urinary tract infection, fever, flu symptoms.)  A pre-procedure physical is not required.     Note  It is sometimes necessary to adjust the procedure schedule due to emergencies. We greatly appreciate your flexibility and understanding in this matter.        _____________________________  ________________________    Check List: The Morning of Your Procedure  ___1. Please do not put anything on your leg(s) or shave the day of your procedure.  ___2. You may take your normal medications the day of your  procedure.  ___3. It is recommended you eat a light breakfast or lunch the day of your procedure.  ___4. Wear comfortable loose-fitting clothing and wide-fitting shoes (i.e. tennis shoes, slip-ons).  ___5. Please arrive at our clinic at the specified time given by the nurse.  ___6. You will sign an affirmation of informed consent.  ___7. Bring your pre-procedure sedation medication (lorazepam and clonidine) with you to the clinic. One hour             before your procedure, you will be instructed to take these medications. The lorazepam (Ativan) lowers             anxiety and sedates you; the clonidine makes the lorazepam more effective. Everyone's body processes             these medications differently. Therefore, reactions to these medications vary. Some people stay awake             and some people sleep through the whole procedure. You may not remember everything about the             procedure or the day. You do not want to make any big decisions for the rest of the day.              The Day of Your Procedure:                                  Phlebectomies  In the Exam Room  A nurse will bring you back to an exam room with your family member or friend. This is when your informed consent will be signed, and you will take your pre-procedure medications.  You will be asked to remove everything from the waist down, including undergarments. You will then put on a hospital gown or shorts and blue booties.  Your surgeon will come in to answer any questions and amira any bulging varicose veins to be removed.  You will be taken to the restroom to empty your bladder before going into the procedure room.    In the Procedure Room  You will be escorted to the procedure room. You will lie on a procedure table covered with a sheet or blanket.  A nurse will put a blood pressure cuff on your arm and a pulse/oxygen monitor on a finger. Your vital signs will be monitored every 15 minutes.  Your gown will be pulled up slightly and  the groin exposed for a short period of time. The surgeon's assistant will clean your foot, leg, and groin with an antibacterial solution. We will get you covered up as quickly as possible!  Sterile towels and drapes will be used to cover you and the table. You will be asked to keep your hands under the drapes during the procedure.  The lights will be turned down. The table will be tipped so your feet are higher than your head. You may feel like you're going to slide off, but you won't.  The Procedure  Medication will be injected to numb your leg. You will feel some needle sticks and may feel discomfort as the medication goes in.   Once this is done, you should not experience significant discomfort. But if you do, please let us know and more numbing medication can be injected.  Small incisions are made where the bulging varicose veins have been marked on your leg(s) and these veins will be removed using a small hector hook instrument.    Post-Procedure  Once the procedure is done, your leg(s) will be washed with warm water and dried. Your leg(s) will be bandaged with large soft dressings and a large ACE bandage wrapped from toes to groin.   You will be offered something to drink and a light snack.  You will rest with your leg(s) elevated for approximately 30 minutes. Your friend or family member may join you.  For your safety, you will be taken to your car in a wheelchair. If you are able to, it is good to keep your leg(s) elevated on the car ride home.        Post-Procedure Instructions:                               Phlebectomies      Post-Op Day Zero - The Day of Your Procedure:  1. Medication for Pain Control and Inflammation Control   - The numbing medication injected during your procedure will last for several hours. The pre-procedure                 tablets may make you very sleepy and you might not remember everything from the procedure or from                 the day. This will usually wear off by the next  day.   - Ibuprofen: If tolerated, take ibuprofen (e.g., Advil) to reduce inflammation whether or not you have                 pain. For three days, take two tablets (200mg each) with every meal and at bedtime with a snack. If                 your pain is not controlled with ibuprofen, you may take prescription pain medication (such as Norco),                 if prescribed.   - You may resume taking any medications you were taking before your procedure.  2. Activity   - Rest with your leg(s) elevated above your heart. This will prevent from a lot of swelling and                 bleeding. You do not need to elevate your leg(s) while sleeping at night. You may go upstairs, sit up to                 eat, use the bathroom, and take several five-minute walks. Otherwise, keep your leg(s) elevated.                 Minimize the amount of time you are up on your feet to about 30 minutes at a time.  3. Bandages   - The incision sites will be covered with soft bandages and an ACE wrap. Keep your bandages on               and dry for 48 hours. The ACE should provide  snug  compression but should not cause pain or               numbness in the toes. If you have significant discomfort or your toes become cold or numb, unwrap               your ACE and rewrap with less tension starting at the toes wrapping upward.  4. Incisions   - Bleeding: You may see some incision sites that are oozing through the bandages. This is not unusual    and can be managed with Rest, Ice, Compression and Elevation (RICE). Apply ice and firm pressure    directly to the site that is bleeding and rest with your leg(s) elevated above your heart for 20-30               minutes.    Post-Op Day One:  1. Medication   - Ibuprofen: Continue the same as the Day of Your Procedure. If your pain is not controlled with    ibuprofen, you may take prescription pain medication (such as Norco), if prescribed.  2. Activity   - We would like you to get up at least six times  and walk around for short periods of time, unless it is    causing you pain. You should not be on your feet more than 90 minutes at a time. Elevate your leg    above your heart when you are not walking.  3. Bandages   - Your bandages must be kept on and dry for 48 hours.  4. Driving   - You may resume driving when you can do so safely. Do not drive if you are taking narcotic pain    medication.  Post-Op Day Two:   1. Medication  - Ibuprofen: Continue the same as the Day of Your Procedure.  2.  Activity   - Walk as tolerated. Elevate as much as possible when not walking.  3. Bandages  - You may remove ACE wrap and padding, unless otherwise instructed by your physician. If your return appointment is before 48 hours, we will take the bandages off for you at the clinic. You may shower like normal after your bandages are removed. To aid in the healing, your physician may recommend wearing compression hose for at least one to two weeks following your phlebectomies.  4. Incisions   - Your leg(s) will be bruised; there may be swelling, hard knots under the skin and possibly some    numbness. These will likely resolve over time. If you see  hair-like  strings coming out of your    incisions, do not pull them (this will only cause pain/discomfort). We will trim them when you come   back for your follow-up appointment.  5. Call Us If:   - You see any areas on your leg that are red and angry in appearance.   - You notice any drainage that is milky or cloudy in appearance or that has a foul odor.   - You run a temperature of 100.5 or greater.    Post-Op Day Three:  You will have a follow up appointment 2-4 days post-procedure. At this appointment, we will check your incisions and see how you are doing.   ________________________________________________________________________________________    The Two Weeks Following Your Procedure  1.  Skin Care   - Do not use any lotions, creams or powders on your incisions for 14 days or  until the incisions have               healed.   - Do not soak in a bathtub, hot tub or go swimming for 14 days or until your incisions have healed.  2.  Medications   - You may use ibuprofen or acetaminophen (e.g., Tylenol) as needed for pain or discomfort.  3.  Activity   - Do not lift over 25 pounds. After about ten days you may resume exercise such as aerobics, running,    tennis or weightlifting. Use your common sense and ease back into your exercise routine slowly.  4.  Travel   - Do not fly in an airplane for 14 days after your procedure.  If you have a long car trip planned within    two to three weeks following your procedure, stop and walk for a few minutes every two hours.    Periodic ankle pumps during the ride may be helpful.    Six Week Appointment  - At your six-week appointment, you will see your surgeon for an exam and evaluation. This office visit               will be scheduled when you return for post-op day three return appointment.    Return to Work  1.  If you work outside the home, you may return to work in a few days depending on the extent of your        procedure, how you tolerate it, and the type of work you perform.  2.  Paperwork: If your employer requires paperwork or you would like a letter written to your employer, please        let us know. We will complete disability type forms at no charge. Please allow five business days for forms        to be completed.

## 2024-12-10 NOTE — PROGRESS NOTES
6-week follow-up for right GSV ablation and phlebectomy, left GSV, SSV and phlebectomy        Subjective:  Patient's left leg is greatly improved his symptoms resolved.  On the right leg where she had more extensive phlebectomy she did have some new veins appear after the procedure.  She still has some pain and achiness at the end of the day but improved from preop.  She still having these despite socks.  The swelling is much improved.      Objective:  B/P: Data Unavailable, T: Data Unavailable, P: Data Unavailable, R: Data Unavailable  Right lower extremity: New varicosities in medial thigh and lateral calf.  All previous stab phlebectomy sites healed.  Left lower extremity: No residual varicosities.  Scattered spider veins.      Assessment/plan:  This is a 34-year-old lady status post a right GSV ablation with phlebectomy and a left GSV, SSV ablation with phlebectomy.  Her left leg is much improved.  She continues to have some symptoms in her right leg though it is improved from preop.  She does have some new varicosities that appeared.  She does have the symptoms despite compression.  She would like to have them treated.  After discussion of the patient the plan at this time is to get insurance approval for a right lower extremity stab phlebectomy.   The procedure, risks, benefits, and alternatives were discussed and the patient agrees to proceed.  She will be scheduled once insurance approval is obtained.  Otherwise, she will follow-up in 6 months as per protocol.    Wai Curiel MD, FACS

## 2024-12-10 NOTE — LETTER
12/10/2024      Binta Peterson  [address] [address] [place] [address]      Dear Colleague,    Thank you for referring your patient, Binta Peterson, to the [facility] [place]. Please see a copy of my visit note below.    6-week follow-up for right GSV ablation and phlebectomy, left GSV, SSV and phlebectomy        Subjective:  Patient's left leg is greatly improved his symptoms resolved.  On the right leg where she had more extensive phlebectomy she did have some new veins appear after the procedure.  She still has some pain and achiness at the end of the day but improved from preop.  She still having these despite socks.  The swelling is much improved.      Objective:  B/P: Data Unavailable, T: Data Unavailable, P: Data Unavailable, R: Data Unavailable  Right lower extremity: New varicosities in medial thigh and lateral calf.  All previous stab phlebectomy sites healed.  Left lower extremity: No residual varicosities.  Scattered spider veins.      Assessment/plan:  This is a 34-year-old lady status post a right GSV ablation with phlebectomy and a left GSV, SSV ablation with phlebectomy.  Her left leg is much improved.  She continues to have some symptoms in her right leg though it is improved from preop.  She does have some new varicosities that appeared.  She does have the symptoms despite compression.  She would like to have them treated.  After discussion of the patient the plan at this time is to get insurance approval for a right lower extremity stab phlebectomy.   The procedure, risks, benefits, and alternatives were discussed and the patient agrees to proceed.  She will be scheduled once insurance approval is obtained.  Otherwise, she will follow-up in 6 months as per protocol.    Wai Curiel MD, FACS        December 10, 2024    Vein Procedure Recommendation    Spoke with patient in clinic.    Dr. Curiel has recommended patient to have the following vein procedure(s):     1.  Right leg  20-30 Phlebectomies (medically necessary)        Patient Pre-op Questions:  Preferred Pharmacy: [facility] in [place]  Anticoagulant/ASA: No  Artificial Joint or Heart Valve:  No  Open ulcer:  No  Sedation nausea: No      Compression hose are not required following this procedure. Patient does have thigh high compression from previous procedure, if wanting to wear them.     Handed patient written procedure instructions to review on her own (see After Visit Summary).    Next steps:    Insurance Submission  Informed patient this process could take up to 14 business days, but once approved, the patient will be contacted by our surgery scheduler to schedule the above procedure. Gave patient our surgery scheduler's information.    Informed patient to call our office regarding the status of their insurance authorization if it has been more than 3 weeks and have not heard from our surgery scheduler.    Patient is in agreement with all of the above and has no further questions at this time.    Laura Amanda RN  [facility]  [facility]      Again, thank you for allowing me to participate in the care of your patient.        Sincerely,        Wai Curiel MD

## 2025-04-07 ENCOUNTER — MYC MEDICAL ADVICE (OUTPATIENT)
Dept: OBGYN | Facility: OTHER | Age: 35
End: 2025-04-07
Payer: COMMERCIAL

## 2025-04-07 ENCOUNTER — MYC REFILL (OUTPATIENT)
Dept: OBGYN | Facility: OTHER | Age: 35
End: 2025-04-07
Payer: COMMERCIAL

## 2025-04-07 DIAGNOSIS — Z30.011 ENCOUNTER FOR INITIAL PRESCRIPTION OF CONTRACEPTIVE PILLS: ICD-10-CM

## 2025-04-07 RX ORDER — ACETAMINOPHEN AND CODEINE PHOSPHATE 120; 12 MG/5ML; MG/5ML
0.35 SOLUTION ORAL DAILY
Qty: 84 TABLET | Refills: 0 | Status: SHIPPED | OUTPATIENT
Start: 2025-04-07

## 2025-04-07 NOTE — TELEPHONE ENCOUNTER
Requested Prescriptions   Pending Prescriptions Disp Refills    norethindrone (MICRONOR) 0.35 MG tablet 84 tablet 3     Sig: Take 1 tablet (0.35 mg) by mouth daily.       Contraceptives Protocol Failed - 4/7/2025  3:01 PM        Failed - Recent (12 mo) or future (90 days) visit within the authorizing provider's specialty     The patient must have completed an in-person or virtual visit within the past 12 months or has a future visit scheduled within the next 90 days with the authorizing provider s specialty.  Urgent care and e-visits do not qualify as an office visit for this protocol.          Passed - Patient is not a current smoker if age is 35 or older        Passed - Medication is active on med list and the sig matches. RN to manually verify dose and sig if red X/fail.     If the protocol passes (green check), you do not need to verify med dose and sig.    A prescription matches if they are the same clinical intention.    For Example: once daily and every morning are the same.    The protocol can not identify upper and lower case letters as matching and will fail.     For Example: Take 1 tablet (50 mg) by mouth daily     TAKE 1 TABLET (50 MG) BY MOUTH DAILY    For all fails (red x), verify dose and sig.    If the refill does match what is on file, the RN can still proceed to approve the refill request.       If they do not match, route to the appropriate provider.             Passed - Medication indicated for associated diagnosis     Medication is associated with one or more of the following diagnoses:  Contraception  Acne  Dysmenorrhea  Menorrhagia  Amenorrhea  PCOS  Premenstrual Dysphoric Disorder  Irregular menses  Endometriosis  Contraceptive counseling  Finding of menstrual bleeding  Education about oral contraception  Uses contraception  Initial prescription of oral contraception  Oral contraception-no problem  Oral contraceptive repeat          Passed - No active pregnancy on record        Passed - No  positive pregnancy test in past 12 months           Pt last seen 6/28/2024 for postpartum    Last prescribed 6/28/2024 for 84 tablets with 3 refills    Protocol is incorrect, pt has been seen within last 12 months- RN sending jake refill    Nubia Walters RN on 4/7/2025 at 3:03 PM

## 2025-05-13 ENCOUNTER — TELEPHONE (OUTPATIENT)
Dept: OBGYN | Facility: OTHER | Age: 35
End: 2025-05-13
Payer: COMMERCIAL

## 2025-05-13 NOTE — TELEPHONE ENCOUNTER
[facility] Call Center    Phone Message    May a detailed message be left on voicemail: yes     Reason for Call: Other: Pt trying to schedule annual exam with Dr Mckeon but unable to online and writer am unable to as well as it show appt exceed limit for Dr Mckeon.  Pt would like to schedule her annual in [place].  Please give pt a call      Action Taken: Message routed to:  Other: er [email or web address]    Travel Screening: Not Applicable     Date of Service:

## 2025-05-13 NOTE — TELEPHONE ENCOUNTER
Patient scheduled with Dr Mckeon for physical.     Ela Jorge, [facility] 5/13/2025 4:13 PM

## 2025-07-01 ENCOUNTER — ANCILLARY PROCEDURE (OUTPATIENT)
Dept: ULTRASOUND IMAGING | Facility: CLINIC | Age: 35
End: 2025-07-01
Attending: SPECIALIST
Payer: COMMERCIAL

## 2025-07-01 ENCOUNTER — OFFICE VISIT (OUTPATIENT)
Dept: VASCULAR SURGERY | Facility: CLINIC | Age: 35
End: 2025-07-01
Attending: SPECIALIST
Payer: COMMERCIAL

## 2025-07-01 DIAGNOSIS — I83.893 SYMPTOMATIC VARICOSE VEINS OF BOTH LOWER EXTREMITIES: ICD-10-CM

## 2025-07-01 DIAGNOSIS — I83.813 VARICOSE VEINS OF BILATERAL LOWER EXTREMITIES WITH PAIN: Primary | ICD-10-CM

## 2025-07-01 PROCEDURE — 99213 OFFICE O/P EST LOW 20 MIN: CPT | Performed by: SPECIALIST

## 2025-07-01 PROCEDURE — 93970 EXTREMITY STUDY: CPT | Performed by: SURGERY

## 2025-07-01 NOTE — LETTER
7/1/2025      Binta Peterson  [address] [address] [address]      Dear Colleague,    Thank you for referring your patient, Binta Peterson, to the [facility] [place]. Please see a copy of my visit note below.    6-month follow-up for right GSV ablation with stab phlebectomy.  And a left GSV SSV ablation with phlebectomy.      Subjective:  Patient is here for 6-month follow-up.  For the past 2 months she has noticed her symptoms returning with increased heaviness and achiness worse at the end of the day.  She has been wearing her compression socks and continues to have the symptoms of despite her compression.  She reports a significant number of new varicosities that appeared over that time.  She now presents for her 6-month follow-up.  Her right leg is worse than her left in symptomatology.    Objective:  B/P: Data Unavailable, T: Data Unavailable, P: Data Unavailable, R: Data Unavailable  Lower extremities: Right lower extremity no edema, extensive varicosities from lateral knee to lateral calf.  Left lower extremities left roll knee and calf varicosities.  No edema.    Ultrasound preliminary  Right GSV is closed 18 mm from the SFJ to the knee.  There is a new  that appeared with a varicose vein.  Left lower extremity: GSV is closed 8 the SFJ to proximal calf and SSV is closed from proximal to distal calf.      Assessment/plan:  This is a 34-year-old lady status post a bilateral GSV ablation, left SSV ablation and bilateral stab phlebectomy.  Initially her legs were much improved.  Over the past couple of months new varicosities of her.  She returns with achiness and heaviness worse at the end of the day despite compression.  After discussion with the patient the plan at this time is to repeat her ultrasound and proceed based on those findings.  She does meet criteria for treatment due to recurrence of symptoms despite compression.  She will follow-up with me after the  ultrasound.  Any further treatment determined by the ultrasound findings.    Wai Curiel MD, FACS    Again, thank you for allowing me to participate in the care of your patient.        Sincerely,        Wai Curiel MD    Electronically signed

## 2025-07-01 NOTE — PROGRESS NOTES
6-month follow-up for right GSV ablation with stab phlebectomy.  And a left GSV SSV ablation with phlebectomy.      Subjective:  Patient is here for 6-month follow-up.  For the past 2 months she has noticed her symptoms returning with increased heaviness and achiness worse at the end of the day.  She has been wearing her compression socks and continues to have the symptoms of despite her compression.  She reports a significant number of new varicosities that appeared over that time.  She now presents for her 6-month follow-up.  Her right leg is worse than her left in symptomatology.    Objective:  B/P: Data Unavailable, T: Data Unavailable, P: Data Unavailable, R: Data Unavailable  Lower extremities: Right lower extremity no edema, extensive varicosities from lateral knee to lateral calf.  Left lower extremities left roll knee and calf varicosities.  No edema.    Ultrasound preliminary  Right GSV is closed 18 mm from the SFJ to the knee.  There is a new  that appeared with a varicose vein.  Left lower extremity: GSV is closed 8 the SFJ to proximal calf and SSV is closed from proximal to distal calf.      Assessment/plan:  This is a 34-year-old lady status post a bilateral GSV ablation, left SSV ablation and bilateral stab phlebectomy.  Initially her legs were much improved.  Over the past couple of months new varicosities of her.  She returns with achiness and heaviness worse at the end of the day despite compression.  After discussion with the patient the plan at this time is to repeat her ultrasound and proceed based on those findings.  She does meet criteria for treatment due to recurrence of symptoms despite compression.  She will follow-up with me after the ultrasound.  Any further treatment determined by the ultrasound findings.    Wai Curiel MD, FACS

## 2025-07-14 SDOH — HEALTH STABILITY: PHYSICAL HEALTH: ON AVERAGE, HOW MANY DAYS PER WEEK DO YOU ENGAGE IN MODERATE TO STRENUOUS EXERCISE (LIKE A BRISK WALK)?: 3 DAYS

## 2025-07-14 SDOH — HEALTH STABILITY: PHYSICAL HEALTH: ON AVERAGE, HOW MANY MINUTES DO YOU ENGAGE IN EXERCISE AT THIS LEVEL?: 30 MIN

## 2025-07-14 ASSESSMENT — SOCIAL DETERMINANTS OF HEALTH (SDOH): HOW OFTEN DO YOU GET TOGETHER WITH FRIENDS OR RELATIVES?: ONCE A WEEK

## 2025-07-15 ENCOUNTER — OFFICE VISIT (OUTPATIENT)
Dept: FAMILY MEDICINE | Facility: OTHER | Age: 35
End: 2025-07-15
Payer: COMMERCIAL

## 2025-07-15 VITALS
SYSTOLIC BLOOD PRESSURE: 104 MMHG | OXYGEN SATURATION: 98 % | HEIGHT: 65 IN | RESPIRATION RATE: 18 BRPM | HEART RATE: 92 BPM | DIASTOLIC BLOOD PRESSURE: 78 MMHG | BODY MASS INDEX: 34.16 KG/M2 | TEMPERATURE: 98 F | WEIGHT: 205 LBS

## 2025-07-15 DIAGNOSIS — Z00.00 ROUTINE GENERAL MEDICAL EXAMINATION AT A HEALTH CARE FACILITY: Primary | ICD-10-CM

## 2025-07-15 DIAGNOSIS — Z13.220 SCREENING FOR LIPID DISORDERS: ICD-10-CM

## 2025-07-15 DIAGNOSIS — L72.3 SEBACEOUS CYST: ICD-10-CM

## 2025-07-15 LAB
CHOLEST SERPL-MCNC: 136 MG/DL
FASTING STATUS PATIENT QL REPORTED: YES
HDLC SERPL-MCNC: 46 MG/DL
LDLC SERPL CALC-MCNC: 64 MG/DL
NONHDLC SERPL-MCNC: 90 MG/DL
TRIGL SERPL-MCNC: 130 MG/DL

## 2025-07-15 PROCEDURE — 99395 PREV VISIT EST AGE 18-39: CPT | Performed by: PHYSICIAN ASSISTANT

## 2025-07-15 PROCEDURE — 36415 COLL VENOUS BLD VENIPUNCTURE: CPT | Performed by: PHYSICIAN ASSISTANT

## 2025-07-15 PROCEDURE — 1126F AMNT PAIN NOTED NONE PRSNT: CPT | Performed by: PHYSICIAN ASSISTANT

## 2025-07-15 PROCEDURE — 3078F DIAST BP <80 MM HG: CPT | Performed by: PHYSICIAN ASSISTANT

## 2025-07-15 PROCEDURE — 80061 LIPID PANEL: CPT | Performed by: PHYSICIAN ASSISTANT

## 2025-07-15 PROCEDURE — 3074F SYST BP LT 130 MM HG: CPT | Performed by: PHYSICIAN ASSISTANT

## 2025-07-15 ASSESSMENT — PAIN SCALES - GENERAL: PAINLEVEL_OUTOF10: NO PAIN (0)

## 2025-07-15 NOTE — PROGRESS NOTES
"Preventive Care Visit  [facility]  Essie GODDARDVita Murillo-ROSALIND Reilly, Family Medicine  Jul 15, 2025    Assessment & Plan     ICD-10-CM    1. Routine general medical examination at a health care facility  Z00.00       2. Screening for lipid disorders  Z13.220 Lipid panel reflex to direct LDL Non-fasting     Lipid panel reflex to direct LDL Non-fasting      3. Sebaceous cyst  L72.3         Patient has been advised of split billing requirements and indicates understanding: Yes    BMI  Estimated body mass index is 34.45 kg/m  as calculated from the following:    Height as of this encounter: 1.643 m (5' 4.69\").    Weight as of this encounter: 93 kg (205 lb).   Weight management plan: Discussed healthy diet and exercise guidelines    Counseling  Appropriate preventive services were addressed with this patient via screening, questionnaire, or discussion as appropriate for fall prevention, nutrition, physical activity, Tobacco-use cessation, social engagement, weight loss and cognition.  Checklist reviewing preventive services available has been given to the patient.  Reviewed patient's diet, addressing concerns and/or questions.   She is at risk for lack of exercise and has been provided with information to increase physical activity for the benefit of her well-being.   Reviewed preventive health counseling, as reflected in patient instructions  Special attention given to:        Regular exercise       Healthy diet/nutrition      - Recommend checking lipid panel, last checked in 2022   - Await results   - Negative glucose while pregnant   - Currently still breast feeding, will defer breast exam to next year     - Sebaceous cyst      Discussed etiology and treatment options, was very fluctuant feeling so patient did elect to see if could be drained today      Verbal consent obtained, no fluid aspirated but with gentle pressure through needle site, the cyst was able to be expressed fully (white " cyst material)      Discussed after care and watching for infection      Discussed may re-form, if does could drain again or consider excision of cyst sack         The patient indicates understanding of these issues and agrees with the plan.    Follow up: 1 year or PRN     Cristiano Murillo-ROSALIND Reilly  [facility] - Milo Judd is a 34 year old, presenting for the following:  Physical        7/15/2025     6:55 AM   Additional Questions   Roomed by Ashley   Accompanied by Self         7/15/2025     6:55 AM   Patient Reported Additional Medications   Patient reports taking the following new medications NA          HPI      Concern - Cyst on top of head  Onset: 4 years  Description: bump on top of head that has been getting larger over the years  Intensity: moderate, tender to touch, or when she mckeon her hair  Progression of Symptoms:  worsening and constant  Accompanying Signs & Symptoms: none  Previous history of similar problem: none  Precipitating factors:        Worsened by: bumping it  Alleviating factors:        Improved by: nothing  Therapies tried and outcome: None          Advance Care Planning  Discussed advance care planning with patient; however, patient declined at this time.        7/14/2025   General Health   How would you rate your overall physical health? Good   Feel stress (tense, anxious, or unable to sleep) Not at all         7/14/2025   Nutrition   Three or more servings of calcium each day? Yes   Diet: Regular (no restrictions)   How many servings of fruit and vegetables per day? (!) 2-3   How many sweetened beverages each day? 0-1         7/14/2025   Exercise   Days per week of moderate/strenous exercise 3 days   Average minutes spent exercising at this level 30 min         7/14/2025   Social Factors   Frequency of gathering with friends or relatives Once a week   Worry food won't last until get money to buy more No   Food not last or not have enough money  for food? No   Do you have housing? (Housing is defined as stable permanent housing and does not include staying outside in a car, in a tent, in an abandoned building, in an overnight shelter, or couch-surfing.) Yes   Are you worried about losing your housing? No   Lack of transportation? No   Unable to get utilities (heat,electricity)? No         7/14/2025   Dental   Dentist two times every year? Yes         Today's PHQ-2 Score:       7/14/2025     8:36 PM   PHQ-2 ( 1999 [employer])   Q1: Little interest or pleasure in doing things 0   Q2: Feeling down, depressed or hopeless 0   PHQ-2 Score 0    Q1: Little interest or pleasure in doing things Not at all   Q2: Feeling down, depressed or hopeless Not at all   PHQ-2 Score 0       Patient-reported           7/14/2025   Substance Use   Alcohol more than 3/day or more than 7/wk No   Do you use any other substances recreationally? No     Social History     Tobacco Use    Smoking status: Never    Smokeless tobacco: Never   Vaping Use    Vaping status: Never Used   Substance Use Topics    Alcohol use: Yes    Drug use: No          Mammogram Screening - Patient under 40 years of age: Routine Mammogram Screening not recommended.         7/14/2025   STI Screening   New sexual partner(s) since last STI/HIV test? No     History of abnormal Pap smear: No - age 30- 64 PAP with HPV every 5 years recommended        Latest Ref Rng & Units 11/22/2021     3:36 PM 7/30/2018     8:32 AM 6/12/2015    12:00 AM   PAP / HPV   PAP  Negative for Intraepithelial Lesion or Malignancy (NILM)      PAP (Historical)   NIL     HPV 16 DNA Negative Negative      HPV 18 DNA Negative Negative      Other HR HPV Negative Negative      PAP-ABSTRACT    See Scanned Document            7/14/2025   Contraception/Family Planning   Questions about contraception or family planning No   What are your periods like? (!) IRREGULAR       Reviewed and updated as needed this visit by Provider   Tobacco  Allergies  Meds   "Problems  Med Hx  Surg Hx  Fam Hx            Past Medical History:   Diagnosis Date    Hypertension Early summer 2020 & spring 2022 gestational hbp    Mild preeclampsia when I was pregnant with my first child    NO ACTIVE PROBLEMS     Varicella Appox. 1993    I had chicken pox around  age    Varicose veins of lower extremity     I started getting them and spider veins in my mid 20 s.     Past Surgical History:   Procedure Laterality Date    APPENDECTOMY  October 2014    NO HISTORY OF SURGERY       Lab work is in process  Labs reviewed in EPIC  BP Readings from Last 3 Encounters:   07/15/25 104/78   10/23/24 102/68   10/02/24 95/67    Wt Readings from Last 3 Encounters:   07/15/25 93 kg (205 lb)   07/24/24 89.4 kg (197 lb)   06/28/24 88.6 kg (195 lb 4.8 oz)                      Review of Systems  Constitutional, neuro, ENT, endocrine, pulmonary, cardiac, gastrointestinal, genitourinary, musculoskeletal, integument and psychiatric systems are negative, except as otherwise noted.     Objective    Exam  /78   Pulse 92   Temp 98  F (36.7  C) (Temporal)   Resp 18   Ht 1.643 m (5' 4.69\")   Wt 93 kg (205 lb)   LMP 07/09/2025 (Exact Date)   SpO2 98%   Breastfeeding Yes   BMI 34.45 kg/m     Estimated body mass index is 34.45 kg/m  as calculated from the following:    Height as of this encounter: 1.643 m (5' 4.69\").    Weight as of this encounter: 93 kg (205 lb).    Physical Exam  Constitutional:       General: She is not in acute distress.     Appearance: She is well-developed.   HENT:      Right Ear: External ear normal. No middle ear effusion. There is no impacted cerumen. Tympanic membrane is not injected, perforated, erythematous or bulging.      Left Ear: External ear normal.  No middle ear effusion. There is no impacted cerumen. Tympanic membrane is not injected, perforated, erythematous or bulging.      Nose: Nose normal. No mucosal edema or rhinorrhea.      Mouth/Throat:      Dentition: " Normal dentition.      Tongue: No lesions. Tongue does not deviate from midline.      Palate: No lesions.      Pharynx: No pharyngeal swelling, oropharyngeal exudate or posterior oropharyngeal erythema.      Tonsils: No tonsillar exudate or tonsillar abscesses.   Eyes:      General: Lids are normal.         Right eye: No discharge.         Left eye: No discharge.      Conjunctiva/sclera: Conjunctivae normal.      Right eye: Right conjunctiva is not injected.      Left eye: Left conjunctiva is not injected.      Pupils: Pupils are equal, round, and reactive to light.   Neck:      Thyroid: No thyroid mass or thyromegaly.      Vascular: No JVD.      Trachea: Trachea normal. No tracheal deviation.   Cardiovascular:      Rate and Rhythm: Normal rate and regular rhythm.      Heart sounds: Normal heart sounds. No murmur heard.     No friction rub. No gallop.   Pulmonary:      Effort: Pulmonary effort is normal. No respiratory distress.      Breath sounds: Normal breath sounds. No stridor or decreased air movement. No wheezing, rhonchi or rales.   Abdominal:      General: Bowel sounds are normal. There is no distension.      Palpations: Abdomen is soft. There is no mass.      Tenderness: There is no abdominal tenderness. There is no guarding or rebound.      Hernia: No hernia is present.   Musculoskeletal:         General: Normal range of motion.      Cervical back: Normal range of motion and neck supple.   Lymphadenopathy:      Cervical: No cervical adenopathy.   Skin:     General: Skin is warm and dry.          Neurological:      Mental Status: She is alert and oriented to person, place, and time.   Psychiatric:         Behavior: Behavior normal.         Thought Content: Thought content normal.         Judgment: Judgment normal.     Breast exam deferred   Pelvic exam not indicated         Diagnostics: Reviewed in Epic, see orders pending in Epic       Procedure Note:  Pause for the cause has been completed prior to the  prceedure.   Patient was identified by both name and date of birth   The correct site was identified   Verbal authorization  to proceed was obtained   Verifed necessary supplies, equipment, and diagnostics are available    Time out was performed immediately prior to procedure    Objective: The lesion(s) is/are of the above mentioned size and location and is/are typical. The area was prepped using alcohol swabs. Using the usual technique, I & D  was performed. No fluid was able to be expressed. Needle was removed and gentle pressure applied. White crumbly cyst material was expressed through needle site. Hemostasis was obtained using pressure. The area was again cleaned by alcohol pads. The procedure was well tolerated and without complications. After care was discussed.         Signed Electronically by: Essie Wick PA-C

## 2025-07-15 NOTE — PATIENT INSTRUCTIONS
Patient Education   Preventive Care Advice   This is general advice given by our system to help you stay healthy. However, your care team may have specific advice just for you. Please talk to your care team about your preventive care needs.  Nutrition  Eat 5 or more servings of fruits and vegetables each day.  Try wheat bread, brown rice and whole grain pasta (instead of white bread, rice, and pasta).  Get enough calcium and vitamin D. Check the label on foods and aim for 100% of the RDA (recommended daily allowance).  Lifestyle  Exercise at least 150 minutes each week  (30 minutes a day, 5 days a week).  Do muscle strengthening activities 2 days a week. These help control your weight and prevent disease.  No smoking.  Wear sunscreen to prevent skin cancer.  Have a dental exam and cleaning every 6 months.  Yearly exams  See your health care team every year to talk about:  Any changes in your health.  Any medicines your care team has prescribed.  Preventive care, family planning, and ways to prevent chronic diseases.  Shots (vaccines)   HPV shots (up to age 26), if you've never had them before.  Hepatitis B shots (up to age 59), if you've never had them before.  COVID-19 shot: Get this shot when it's due.  Flu shot: Get a flu shot every year.  Tetanus shot: Get a tetanus shot every 10 years.  Pneumococcal, hepatitis A, and RSV shots: Ask your care team if you need these based on your risk.  Shingles shot (for age 50 and up)  General health tests  Diabetes screening:  Starting at age 35, Get screened for diabetes at least every 3 years.  If you are younger than age 35, ask your care team if you should be screened for diabetes.  Cholesterol test: At age 39, start having a cholesterol test every 5 years, or more often if advised.  Bone density scan (DEXA): At age 50, ask your care team if you should have this scan for osteoporosis (brittle bones).  Hepatitis C: Get tested at least once in your life.  STIs (sexually  transmitted infections)  Before age 24: Ask your care team if you should be screened for STIs.  After age 24: Get screened for STIs if you're at risk. You are at risk for STIs (including HIV) if:  You are sexually active with more than one person.  You don't use condoms every time.  You or a partner was diagnosed with a sexually transmitted infection.  If you are at risk for HIV, ask about PrEP medicine to prevent HIV.  Get tested for HIV at least once in your life, whether you are at risk for HIV or not.  Cancer screening tests  Cervical cancer screening: If you have a cervix, begin getting regular cervical cancer screening tests starting at age 21.  Breast cancer scan (mammogram): If you've ever had breasts, begin having regular mammograms starting at age 40. This is a scan to check for breast cancer.  Colon cancer screening: It is important to start screening for colon cancer at age 45.  Have a colonoscopy test every 10 years (or more often if you're at risk) Or, ask your provider about stool tests like a FIT test every year or Cologuard test every 3 years.  To learn more about your testing options, visit:   .  For help making a decision, visit:   https://bit.ly/sy19013.  Prostate cancer screening test: If you have a prostate, ask your care team if a prostate cancer screening test (PSA) at age 55 is right for you.  Lung cancer screening: If you are a current or former smoker ages 50 to 80, ask your care team if ongoing lung cancer screenings are right for you.  For informational purposes only. Not to replace the advice of your health care provider. Copyright   2023 [facility]. All rights reserved. Clinically reviewed by the [facility] Transitions Program. [employer] 432107 - REV 01/24.

## 2025-07-17 ENCOUNTER — OFFICE VISIT (OUTPATIENT)
Dept: OBGYN | Facility: OTHER | Age: 35
End: 2025-07-17
Payer: COMMERCIAL

## 2025-07-17 VITALS
WEIGHT: 206.2 LBS | DIASTOLIC BLOOD PRESSURE: 84 MMHG | HEIGHT: 65 IN | BODY MASS INDEX: 34.35 KG/M2 | SYSTOLIC BLOOD PRESSURE: 126 MMHG

## 2025-07-17 DIAGNOSIS — Z01.419 ENCOUNTER FOR GYNECOLOGICAL EXAMINATION WITHOUT ABNORMAL FINDING: ICD-10-CM

## 2025-07-17 DIAGNOSIS — Z30.41 SURVEILLANCE OF PREVIOUSLY PRESCRIBED CONTRACEPTIVE PILL: Primary | ICD-10-CM

## 2025-07-17 RX ORDER — ACETAMINOPHEN AND CODEINE PHOSPHATE 120; 12 MG/5ML; MG/5ML
0.35 SOLUTION ORAL DAILY
Qty: 84 TABLET | Refills: 3 | Status: SHIPPED | OUTPATIENT
Start: 2025-07-17

## 2025-07-17 NOTE — PROGRESS NOTES
OB/GYN      NAME:  Binta Peterson  PCP:  Essie Wick  MRN:  [id number]    Impression / Plan     34 year old  with:      ICD-10-CM    1. Surveillance of previously prescribed contraceptive pill  Z30.41 norethindrone (MICRONOR) 0.35 MG tablet      2. Encounter for gynecological examination without abnormal finding  Z01.419           Plan to continue the micronor.  The use of combination hormone contraception in women with SVT is associated with an increased risk of VTE, so progestin-only methods are preferred in this population.   Handout given for the Mirena IUD and she will consider this option.      Chief Complaint     Chief Complaint   Patient presents with    Gyn Exam       HPI     Binta Peterson is a  34 year old female who is seen for gyn exam.  She had her preventative health visit with Cristiano Wick earlier this week.    She has been on micronor since her PP visit done last year.  She is nursing.  She has a history of SVT and has been seeing Dr. Curiel.  She thinks she has some new varicose veins and she is planning to follow up with him.  SVT was diagnosed with her last pregnancy and she was placed on lovenox, which was stopped after the pregnancy.    Patient's last menstrual period was 2025 (exact date).       Problem List     Patient Active Problem List    Diagnosis Date Noted    Motion sickness 2019     Priority: Medium       Medications     Current Outpatient Medications   Medication Sig Dispense Refill    Ascorbic Acid (VITAMIN C) 500 MG PACK       FIBER ADULT GUMMIES PO       norethindrone (MICRONOR) 0.35 MG tablet Take 1 tablet (0.35 mg) by mouth daily. 84 tablet 3    norethindrone (MICRONOR) 0.35 MG tablet Take 1 tablet (0.35 mg) by mouth daily. 84 tablet 0    Prenatal Vit-Fe Fumarate-FA (PRENATAL VITAMIN PO)        No current facility-administered medications for this visit.        Allergies     No Known Allergies    ROS     Pertinent  "positives and negatives are listed in the HPI.     Physical Exam   Vitals: /84   Ht 1.643 m (5' 4.69\")   Wt 93.5 kg (206 lb 3.2 oz)   LMP 07/09/2025 (Exact Date)   Breastfeeding Yes   BMI 34.64 kg/m      General: Comfortable, no obvious distress   : Normal female external genitalia.  No lesions.  Urethral meatus normal.  Speculum exam reveals a normal vaginal vault, normal cervix.  No abnormal discharge.  Bimanual exam reveals a normal, mobile, nontender uterus.  No cervical motion tenderness.  Adnexa nontender with no palpable masses.                Breanne Mckeon MD     "

## 2025-08-05 ENCOUNTER — ANCILLARY PROCEDURE (OUTPATIENT)
Dept: ULTRASOUND IMAGING | Facility: CLINIC | Age: 35
End: 2025-08-05
Attending: SPECIALIST
Payer: COMMERCIAL

## 2025-08-05 ENCOUNTER — OFFICE VISIT (OUTPATIENT)
Dept: VASCULAR SURGERY | Facility: CLINIC | Age: 35
End: 2025-08-05
Attending: SPECIALIST
Payer: COMMERCIAL

## 2025-08-05 DIAGNOSIS — I83.893 VARICOSE VEINS OF LEG WITH EDEMA, BILATERAL: ICD-10-CM

## 2025-08-05 DIAGNOSIS — I83.893 SYMPTOMATIC VARICOSE VEINS OF BOTH LOWER EXTREMITIES: ICD-10-CM

## 2025-08-05 DIAGNOSIS — I83.813 VARICOSE VEINS OF BILATERAL LOWER EXTREMITIES WITH PAIN: Primary | ICD-10-CM

## 2025-08-05 PROCEDURE — 99213 OFFICE O/P EST LOW 20 MIN: CPT | Performed by: SPECIALIST

## 2025-08-05 PROCEDURE — 93970 EXTREMITY STUDY: CPT | Performed by: SURGERY
